# Patient Record
Sex: FEMALE | Race: WHITE | NOT HISPANIC OR LATINO | Employment: UNEMPLOYED | URBAN - METROPOLITAN AREA
[De-identification: names, ages, dates, MRNs, and addresses within clinical notes are randomized per-mention and may not be internally consistent; named-entity substitution may affect disease eponyms.]

---

## 2017-01-10 ENCOUNTER — OFFICE VISIT (OUTPATIENT)
Dept: CARDIAC REHAB | Facility: HOSPITAL | Age: 57
End: 2017-01-10

## 2017-01-10 DIAGNOSIS — J44.9 COPD MIXED TYPE (HCC): Primary | ICD-10-CM

## 2017-01-12 ENCOUNTER — OFFICE VISIT (OUTPATIENT)
Dept: CARDIAC REHAB | Facility: HOSPITAL | Age: 57
End: 2017-01-12

## 2017-01-12 DIAGNOSIS — J44.9 COPD MIXED TYPE (HCC): Primary | ICD-10-CM

## 2017-01-19 ENCOUNTER — OFFICE VISIT (OUTPATIENT)
Dept: CARDIAC REHAB | Facility: HOSPITAL | Age: 57
End: 2017-01-19

## 2017-01-19 DIAGNOSIS — J44.9 COPD MIXED TYPE (HCC): Primary | ICD-10-CM

## 2017-01-24 ENCOUNTER — OFFICE VISIT (OUTPATIENT)
Dept: CARDIAC REHAB | Facility: HOSPITAL | Age: 57
End: 2017-01-24

## 2017-01-24 DIAGNOSIS — J44.9 COPD MIXED TYPE (HCC): Primary | ICD-10-CM

## 2017-01-31 ENCOUNTER — OFFICE VISIT (OUTPATIENT)
Dept: CARDIAC REHAB | Facility: HOSPITAL | Age: 57
End: 2017-01-31

## 2017-01-31 DIAGNOSIS — J44.9 COPD MIXED TYPE (HCC): Primary | ICD-10-CM

## 2017-02-02 ENCOUNTER — OFFICE VISIT (OUTPATIENT)
Dept: CARDIAC REHAB | Facility: HOSPITAL | Age: 57
End: 2017-02-02

## 2017-02-02 DIAGNOSIS — J44.9 COPD MIXED TYPE (HCC): Primary | ICD-10-CM

## 2017-02-07 ENCOUNTER — OFFICE VISIT (OUTPATIENT)
Dept: CARDIAC REHAB | Facility: HOSPITAL | Age: 57
End: 2017-02-07

## 2017-02-07 DIAGNOSIS — J44.9 COPD MIXED TYPE (HCC): Primary | ICD-10-CM

## 2017-02-16 ENCOUNTER — OFFICE VISIT (OUTPATIENT)
Dept: CARDIAC REHAB | Facility: HOSPITAL | Age: 57
End: 2017-02-16

## 2017-02-16 DIAGNOSIS — J44.9 COPD MIXED TYPE (HCC): Primary | ICD-10-CM

## 2017-02-21 ENCOUNTER — OFFICE VISIT (OUTPATIENT)
Dept: CARDIAC REHAB | Facility: HOSPITAL | Age: 57
End: 2017-02-21

## 2017-02-21 DIAGNOSIS — J44.9 COPD MIXED TYPE (HCC): Primary | ICD-10-CM

## 2017-02-23 ENCOUNTER — OFFICE VISIT (OUTPATIENT)
Dept: CARDIAC REHAB | Facility: HOSPITAL | Age: 57
End: 2017-02-23

## 2017-02-23 DIAGNOSIS — J44.9 COPD MIXED TYPE (HCC): Primary | ICD-10-CM

## 2017-02-28 ENCOUNTER — OFFICE VISIT (OUTPATIENT)
Dept: CARDIAC REHAB | Facility: HOSPITAL | Age: 57
End: 2017-02-28

## 2017-02-28 DIAGNOSIS — J44.9 COPD MIXED TYPE (HCC): Primary | ICD-10-CM

## 2017-03-02 ENCOUNTER — OFFICE VISIT (OUTPATIENT)
Dept: CARDIAC REHAB | Facility: HOSPITAL | Age: 57
End: 2017-03-02

## 2017-03-02 DIAGNOSIS — J44.9 COPD MIXED TYPE (HCC): Primary | ICD-10-CM

## 2017-03-16 ENCOUNTER — OFFICE VISIT (OUTPATIENT)
Dept: CARDIAC REHAB | Facility: HOSPITAL | Age: 57
End: 2017-03-16

## 2017-03-16 DIAGNOSIS — J44.9 COPD MIXED TYPE (HCC): Primary | ICD-10-CM

## 2017-03-21 ENCOUNTER — OFFICE VISIT (OUTPATIENT)
Dept: CARDIAC REHAB | Facility: HOSPITAL | Age: 57
End: 2017-03-21

## 2017-03-21 DIAGNOSIS — J44.9 COPD MIXED TYPE (HCC): Primary | ICD-10-CM

## 2017-03-22 ENCOUNTER — TRANSCRIBE ORDERS (OUTPATIENT)
Dept: CARDIAC REHAB | Facility: HOSPITAL | Age: 57
End: 2017-03-22

## 2017-03-22 DIAGNOSIS — J44.9 COPD MIXED TYPE (HCC): Primary | ICD-10-CM

## 2017-04-04 ENCOUNTER — OFFICE VISIT (OUTPATIENT)
Dept: CARDIAC REHAB | Facility: HOSPITAL | Age: 57
End: 2017-04-04

## 2017-04-04 DIAGNOSIS — J44.9 COPD MIXED TYPE (HCC): Primary | ICD-10-CM

## 2017-04-06 ENCOUNTER — OFFICE VISIT (OUTPATIENT)
Dept: CARDIAC REHAB | Facility: HOSPITAL | Age: 57
End: 2017-04-06

## 2017-04-06 DIAGNOSIS — J44.9 COPD MIXED TYPE (HCC): Primary | ICD-10-CM

## 2017-04-11 ENCOUNTER — OFFICE VISIT (OUTPATIENT)
Dept: CARDIAC REHAB | Facility: HOSPITAL | Age: 57
End: 2017-04-11

## 2017-04-11 DIAGNOSIS — J44.9 COPD MIXED TYPE (HCC): Primary | ICD-10-CM

## 2017-04-18 ENCOUNTER — OFFICE VISIT (OUTPATIENT)
Dept: CARDIAC REHAB | Facility: HOSPITAL | Age: 57
End: 2017-04-18

## 2017-04-18 DIAGNOSIS — J44.9 COPD MIXED TYPE (HCC): Primary | ICD-10-CM

## 2017-04-25 ENCOUNTER — OFFICE VISIT (OUTPATIENT)
Dept: CARDIAC REHAB | Facility: HOSPITAL | Age: 57
End: 2017-04-25

## 2017-04-25 DIAGNOSIS — J44.9 COPD MIXED TYPE (HCC): Primary | ICD-10-CM

## 2017-04-27 ENCOUNTER — OFFICE VISIT (OUTPATIENT)
Dept: CARDIAC REHAB | Facility: HOSPITAL | Age: 57
End: 2017-04-27

## 2017-04-27 DIAGNOSIS — J44.9 COPD MIXED TYPE (HCC): Primary | ICD-10-CM

## 2017-05-09 ENCOUNTER — OFFICE VISIT (OUTPATIENT)
Dept: CARDIAC REHAB | Facility: HOSPITAL | Age: 57
End: 2017-05-09

## 2017-05-09 DIAGNOSIS — J44.9 COPD MIXED TYPE (HCC): Primary | ICD-10-CM

## 2017-05-25 ENCOUNTER — OFFICE VISIT (OUTPATIENT)
Dept: CARDIAC REHAB | Facility: HOSPITAL | Age: 57
End: 2017-05-25

## 2017-05-25 DIAGNOSIS — J44.9 COPD MIXED TYPE (HCC): Primary | ICD-10-CM

## 2017-05-30 ENCOUNTER — OFFICE VISIT (OUTPATIENT)
Dept: CARDIAC REHAB | Facility: HOSPITAL | Age: 57
End: 2017-05-30

## 2017-05-30 DIAGNOSIS — J44.9 COPD MIXED TYPE (HCC): Primary | ICD-10-CM

## 2017-06-08 ENCOUNTER — OFFICE VISIT (OUTPATIENT)
Dept: CARDIAC REHAB | Facility: HOSPITAL | Age: 57
End: 2017-06-08

## 2017-06-08 DIAGNOSIS — J44.9 COPD MIXED TYPE (HCC): Primary | ICD-10-CM

## 2017-06-13 ENCOUNTER — OFFICE VISIT (OUTPATIENT)
Dept: CARDIAC REHAB | Facility: HOSPITAL | Age: 57
End: 2017-06-13

## 2017-06-13 DIAGNOSIS — J44.9 COPD MIXED TYPE (HCC): Primary | ICD-10-CM

## 2017-08-29 ENCOUNTER — OFFICE VISIT (OUTPATIENT)
Dept: CARDIAC REHAB | Facility: HOSPITAL | Age: 57
End: 2017-08-29

## 2017-08-29 DIAGNOSIS — J44.9 COPD MIXED TYPE (HCC): Primary | ICD-10-CM

## 2017-09-05 ENCOUNTER — OFFICE VISIT (OUTPATIENT)
Dept: CARDIAC REHAB | Facility: HOSPITAL | Age: 57
End: 2017-09-05

## 2017-09-05 DIAGNOSIS — J44.9 COPD MIXED TYPE (HCC): Primary | ICD-10-CM

## 2017-09-12 ENCOUNTER — OFFICE VISIT (OUTPATIENT)
Dept: CARDIAC REHAB | Facility: HOSPITAL | Age: 57
End: 2017-09-12

## 2017-09-12 DIAGNOSIS — J44.9 COPD MIXED TYPE (HCC): Primary | ICD-10-CM

## 2017-09-14 ENCOUNTER — OFFICE VISIT (OUTPATIENT)
Dept: CARDIAC REHAB | Facility: HOSPITAL | Age: 57
End: 2017-09-14

## 2017-09-14 DIAGNOSIS — J44.9 COPD MIXED TYPE (HCC): Primary | ICD-10-CM

## 2017-09-19 ENCOUNTER — OFFICE VISIT (OUTPATIENT)
Dept: CARDIAC REHAB | Facility: HOSPITAL | Age: 57
End: 2017-09-19

## 2017-09-19 DIAGNOSIS — J44.9 COPD MIXED TYPE (HCC): Primary | ICD-10-CM

## 2017-10-10 ENCOUNTER — OFFICE VISIT (OUTPATIENT)
Dept: CARDIAC REHAB | Facility: HOSPITAL | Age: 57
End: 2017-10-10

## 2017-10-10 DIAGNOSIS — J44.9 COPD MIXED TYPE (HCC): Primary | ICD-10-CM

## 2017-10-17 ENCOUNTER — OFFICE VISIT (OUTPATIENT)
Dept: CARDIAC REHAB | Facility: HOSPITAL | Age: 57
End: 2017-10-17

## 2017-10-17 DIAGNOSIS — J44.9 COPD MIXED TYPE (HCC): Primary | ICD-10-CM

## 2018-02-22 ENCOUNTER — TRANSCRIBE ORDERS (OUTPATIENT)
Dept: CARDIAC REHAB | Facility: HOSPITAL | Age: 58
End: 2018-02-22

## 2018-02-22 DIAGNOSIS — J44.9 COPD MIXED TYPE (HCC): Primary | ICD-10-CM

## 2018-08-30 ENCOUNTER — TRANSCRIBE ORDERS (OUTPATIENT)
Dept: CARDIAC REHAB | Facility: HOSPITAL | Age: 58
End: 2018-08-30

## 2018-08-30 ENCOUNTER — OFFICE VISIT (OUTPATIENT)
Dept: CARDIAC REHAB | Facility: HOSPITAL | Age: 58
End: 2018-08-30

## 2018-08-30 DIAGNOSIS — J44.9 COPD MIXED TYPE (HCC): Primary | ICD-10-CM

## 2018-09-04 ENCOUNTER — OFFICE VISIT (OUTPATIENT)
Dept: CARDIAC REHAB | Facility: HOSPITAL | Age: 58
End: 2018-09-04

## 2018-09-04 DIAGNOSIS — J44.9 COPD MIXED TYPE (HCC): Primary | ICD-10-CM

## 2018-09-06 ENCOUNTER — OFFICE VISIT (OUTPATIENT)
Dept: CARDIAC REHAB | Facility: HOSPITAL | Age: 58
End: 2018-09-06

## 2018-09-06 DIAGNOSIS — J44.9 COPD MIXED TYPE (HCC): Primary | ICD-10-CM

## 2018-09-13 ENCOUNTER — OFFICE VISIT (OUTPATIENT)
Dept: CARDIAC REHAB | Facility: HOSPITAL | Age: 58
End: 2018-09-13

## 2018-09-13 DIAGNOSIS — J44.9 COPD MIXED TYPE (HCC): Primary | ICD-10-CM

## 2018-09-18 ENCOUNTER — OFFICE VISIT (OUTPATIENT)
Dept: CARDIAC REHAB | Facility: HOSPITAL | Age: 58
End: 2018-09-18

## 2018-09-18 DIAGNOSIS — J44.9 COPD MIXED TYPE (HCC): Primary | ICD-10-CM

## 2018-09-20 ENCOUNTER — OFFICE VISIT (OUTPATIENT)
Dept: CARDIAC REHAB | Facility: HOSPITAL | Age: 58
End: 2018-09-20

## 2018-09-20 DIAGNOSIS — J44.9 COPD MIXED TYPE (HCC): Primary | ICD-10-CM

## 2018-09-25 ENCOUNTER — OFFICE VISIT (OUTPATIENT)
Dept: CARDIAC REHAB | Facility: HOSPITAL | Age: 58
End: 2018-09-25

## 2018-09-25 DIAGNOSIS — J44.9 COPD MIXED TYPE (HCC): Primary | ICD-10-CM

## 2018-09-27 ENCOUNTER — OFFICE VISIT (OUTPATIENT)
Dept: CARDIAC REHAB | Facility: HOSPITAL | Age: 58
End: 2018-09-27

## 2018-09-27 DIAGNOSIS — J44.9 COPD MIXED TYPE (HCC): Primary | ICD-10-CM

## 2018-10-09 ENCOUNTER — OFFICE VISIT (OUTPATIENT)
Dept: CARDIAC REHAB | Facility: HOSPITAL | Age: 58
End: 2018-10-09

## 2018-10-09 DIAGNOSIS — J44.9 COPD MIXED TYPE (HCC): Primary | ICD-10-CM

## 2018-10-16 ENCOUNTER — OFFICE VISIT (OUTPATIENT)
Dept: CARDIAC REHAB | Facility: HOSPITAL | Age: 58
End: 2018-10-16

## 2018-10-16 DIAGNOSIS — J44.9 COPD MIXED TYPE (HCC): Primary | ICD-10-CM

## 2018-10-23 ENCOUNTER — OFFICE VISIT (OUTPATIENT)
Dept: CARDIAC REHAB | Facility: HOSPITAL | Age: 58
End: 2018-10-23

## 2018-10-23 DIAGNOSIS — J44.9 COPD MIXED TYPE (HCC): Primary | ICD-10-CM

## 2018-10-25 ENCOUNTER — OFFICE VISIT (OUTPATIENT)
Dept: CARDIAC REHAB | Facility: HOSPITAL | Age: 58
End: 2018-10-25

## 2018-10-25 DIAGNOSIS — J44.9 COPD MIXED TYPE (HCC): Primary | ICD-10-CM

## 2018-10-30 ENCOUNTER — OFFICE VISIT (OUTPATIENT)
Dept: CARDIAC REHAB | Facility: HOSPITAL | Age: 58
End: 2018-10-30

## 2018-10-30 DIAGNOSIS — J44.9 COPD MIXED TYPE (HCC): Primary | ICD-10-CM

## 2018-11-01 ENCOUNTER — OFFICE VISIT (OUTPATIENT)
Dept: CARDIAC REHAB | Facility: HOSPITAL | Age: 58
End: 2018-11-01

## 2018-11-01 DIAGNOSIS — J44.9 COPD MIXED TYPE (HCC): Primary | ICD-10-CM

## 2018-11-06 ENCOUNTER — OFFICE VISIT (OUTPATIENT)
Dept: CARDIAC REHAB | Facility: HOSPITAL | Age: 58
End: 2018-11-06

## 2018-11-06 DIAGNOSIS — J44.9 COPD MIXED TYPE (HCC): Primary | ICD-10-CM

## 2018-11-08 ENCOUNTER — OFFICE VISIT (OUTPATIENT)
Dept: CARDIAC REHAB | Facility: HOSPITAL | Age: 58
End: 2018-11-08

## 2018-11-08 DIAGNOSIS — J44.9 COPD MIXED TYPE (HCC): Primary | ICD-10-CM

## 2018-11-13 ENCOUNTER — OFFICE VISIT (OUTPATIENT)
Dept: CARDIAC REHAB | Facility: HOSPITAL | Age: 58
End: 2018-11-13

## 2018-11-13 DIAGNOSIS — J44.9 COPD MIXED TYPE (HCC): Primary | ICD-10-CM

## 2018-11-29 ENCOUNTER — OFFICE VISIT (OUTPATIENT)
Dept: CARDIAC REHAB | Facility: HOSPITAL | Age: 58
End: 2018-11-29

## 2018-11-29 DIAGNOSIS — J44.9 COPD MIXED TYPE (HCC): Primary | ICD-10-CM

## 2018-12-04 ENCOUNTER — OFFICE VISIT (OUTPATIENT)
Dept: CARDIAC REHAB | Facility: HOSPITAL | Age: 58
End: 2018-12-04

## 2018-12-04 DIAGNOSIS — J44.9 COPD MIXED TYPE (HCC): Primary | ICD-10-CM

## 2018-12-06 ENCOUNTER — OFFICE VISIT (OUTPATIENT)
Dept: CARDIAC REHAB | Facility: HOSPITAL | Age: 58
End: 2018-12-06

## 2018-12-06 DIAGNOSIS — J44.9 COPD MIXED TYPE (HCC): Primary | ICD-10-CM

## 2018-12-11 ENCOUNTER — OFFICE VISIT (OUTPATIENT)
Dept: CARDIAC REHAB | Facility: HOSPITAL | Age: 58
End: 2018-12-11

## 2018-12-11 DIAGNOSIS — J44.9 COPD MIXED TYPE (HCC): Primary | ICD-10-CM

## 2018-12-18 ENCOUNTER — OFFICE VISIT (OUTPATIENT)
Dept: CARDIAC REHAB | Facility: HOSPITAL | Age: 58
End: 2018-12-18

## 2018-12-18 DIAGNOSIS — J44.9 COPD MIXED TYPE (HCC): Primary | ICD-10-CM

## 2018-12-20 ENCOUNTER — OFFICE VISIT (OUTPATIENT)
Dept: CARDIAC REHAB | Facility: HOSPITAL | Age: 58
End: 2018-12-20

## 2018-12-20 DIAGNOSIS — J44.9 COPD MIXED TYPE (HCC): Primary | ICD-10-CM

## 2019-01-03 ENCOUNTER — OFFICE VISIT (OUTPATIENT)
Dept: CARDIAC REHAB | Facility: HOSPITAL | Age: 59
End: 2019-01-03

## 2019-01-03 DIAGNOSIS — J44.9 COPD MIXED TYPE (HCC): Primary | ICD-10-CM

## 2019-01-08 ENCOUNTER — OFFICE VISIT (OUTPATIENT)
Dept: CARDIAC REHAB | Facility: HOSPITAL | Age: 59
End: 2019-01-08

## 2019-01-08 DIAGNOSIS — J44.9 COPD MIXED TYPE (HCC): Primary | ICD-10-CM

## 2019-01-10 ENCOUNTER — OFFICE VISIT (OUTPATIENT)
Dept: CARDIAC REHAB | Facility: HOSPITAL | Age: 59
End: 2019-01-10

## 2019-01-10 DIAGNOSIS — J44.9 COPD MIXED TYPE (HCC): Primary | ICD-10-CM

## 2019-01-15 ENCOUNTER — OFFICE VISIT (OUTPATIENT)
Dept: CARDIAC REHAB | Facility: HOSPITAL | Age: 59
End: 2019-01-15

## 2019-01-15 DIAGNOSIS — J44.9 COPD MIXED TYPE (HCC): Primary | ICD-10-CM

## 2019-01-17 ENCOUNTER — OFFICE VISIT (OUTPATIENT)
Dept: CARDIAC REHAB | Facility: HOSPITAL | Age: 59
End: 2019-01-17

## 2019-01-17 DIAGNOSIS — J44.9 COPD MIXED TYPE (HCC): Primary | ICD-10-CM

## 2019-02-05 ENCOUNTER — OFFICE VISIT (OUTPATIENT)
Dept: CARDIAC REHAB | Facility: HOSPITAL | Age: 59
End: 2019-02-05

## 2019-02-05 DIAGNOSIS — J44.9 COPD MIXED TYPE (HCC): Primary | ICD-10-CM

## 2019-02-14 ENCOUNTER — OFFICE VISIT (OUTPATIENT)
Dept: CARDIAC REHAB | Facility: HOSPITAL | Age: 59
End: 2019-02-14

## 2019-02-14 DIAGNOSIS — J44.9 COPD MIXED TYPE (HCC): Primary | ICD-10-CM

## 2019-02-19 ENCOUNTER — OFFICE VISIT (OUTPATIENT)
Dept: CARDIAC REHAB | Facility: HOSPITAL | Age: 59
End: 2019-02-19

## 2019-02-19 DIAGNOSIS — J44.9 COPD MIXED TYPE (HCC): Primary | ICD-10-CM

## 2019-02-26 ENCOUNTER — OFFICE VISIT (OUTPATIENT)
Dept: CARDIAC REHAB | Facility: HOSPITAL | Age: 59
End: 2019-02-26

## 2019-02-26 DIAGNOSIS — J44.9 COPD MIXED TYPE (HCC): Primary | ICD-10-CM

## 2019-03-12 ENCOUNTER — OFFICE VISIT (OUTPATIENT)
Dept: CARDIAC REHAB | Facility: HOSPITAL | Age: 59
End: 2019-03-12

## 2019-03-12 DIAGNOSIS — J44.9 COPD MIXED TYPE (HCC): Primary | ICD-10-CM

## 2019-03-14 ENCOUNTER — OFFICE VISIT (OUTPATIENT)
Dept: CARDIAC REHAB | Facility: HOSPITAL | Age: 59
End: 2019-03-14

## 2019-03-14 DIAGNOSIS — J44.9 COPD MIXED TYPE (HCC): Primary | ICD-10-CM

## 2019-03-19 ENCOUNTER — OFFICE VISIT (OUTPATIENT)
Dept: CARDIAC REHAB | Facility: HOSPITAL | Age: 59
End: 2019-03-19

## 2019-03-19 DIAGNOSIS — J44.9 COPD MIXED TYPE (HCC): Primary | ICD-10-CM

## 2019-03-21 ENCOUNTER — APPOINTMENT (OUTPATIENT)
Dept: CARDIAC REHAB | Facility: HOSPITAL | Age: 59
End: 2019-03-21

## 2019-03-28 ENCOUNTER — OFFICE VISIT (OUTPATIENT)
Dept: CARDIAC REHAB | Facility: HOSPITAL | Age: 59
End: 2019-03-28

## 2019-03-28 DIAGNOSIS — J44.9 COPD MIXED TYPE (HCC): Primary | ICD-10-CM

## 2019-04-23 ENCOUNTER — OFFICE VISIT (OUTPATIENT)
Dept: CARDIAC REHAB | Facility: HOSPITAL | Age: 59
End: 2019-04-23

## 2019-04-23 DIAGNOSIS — J44.9 COPD MIXED TYPE (HCC): Primary | ICD-10-CM

## 2019-04-25 ENCOUNTER — OFFICE VISIT (OUTPATIENT)
Dept: CARDIAC REHAB | Facility: HOSPITAL | Age: 59
End: 2019-04-25

## 2019-04-25 DIAGNOSIS — J44.9 COPD MIXED TYPE (HCC): Primary | ICD-10-CM

## 2019-04-30 ENCOUNTER — OFFICE VISIT (OUTPATIENT)
Dept: CARDIAC REHAB | Facility: HOSPITAL | Age: 59
End: 2019-04-30

## 2019-04-30 DIAGNOSIS — J44.9 COPD MIXED TYPE (HCC): Primary | ICD-10-CM

## 2019-05-02 ENCOUNTER — OFFICE VISIT (OUTPATIENT)
Dept: CARDIAC REHAB | Facility: HOSPITAL | Age: 59
End: 2019-05-02

## 2019-05-02 DIAGNOSIS — J44.9 COPD MIXED TYPE (HCC): Primary | ICD-10-CM

## 2019-05-09 ENCOUNTER — OFFICE VISIT (OUTPATIENT)
Dept: CARDIAC REHAB | Facility: HOSPITAL | Age: 59
End: 2019-05-09

## 2019-05-09 DIAGNOSIS — J44.9 COPD MIXED TYPE (HCC): Primary | ICD-10-CM

## 2019-05-14 ENCOUNTER — OFFICE VISIT (OUTPATIENT)
Dept: CARDIAC REHAB | Facility: HOSPITAL | Age: 59
End: 2019-05-14

## 2019-05-14 DIAGNOSIS — J44.9 COPD MIXED TYPE (HCC): Primary | ICD-10-CM

## 2019-05-28 ENCOUNTER — OFFICE VISIT (OUTPATIENT)
Dept: CARDIAC REHAB | Facility: HOSPITAL | Age: 59
End: 2019-05-28

## 2019-05-28 DIAGNOSIS — J44.9 COPD MIXED TYPE (HCC): Primary | ICD-10-CM

## 2019-05-30 ENCOUNTER — OFFICE VISIT (OUTPATIENT)
Dept: CARDIAC REHAB | Facility: HOSPITAL | Age: 59
End: 2019-05-30

## 2019-05-30 DIAGNOSIS — J44.9 COPD MIXED TYPE (HCC): Primary | ICD-10-CM

## 2019-06-04 ENCOUNTER — OFFICE VISIT (OUTPATIENT)
Dept: CARDIAC REHAB | Facility: HOSPITAL | Age: 59
End: 2019-06-04

## 2019-06-04 DIAGNOSIS — J44.9 COPD MIXED TYPE (HCC): Primary | ICD-10-CM

## 2019-06-25 ENCOUNTER — OFFICE VISIT (OUTPATIENT)
Dept: CARDIAC REHAB | Facility: HOSPITAL | Age: 59
End: 2019-06-25

## 2019-06-25 DIAGNOSIS — J44.9 COPD MIXED TYPE (HCC): Primary | ICD-10-CM

## 2019-07-02 ENCOUNTER — OFFICE VISIT (OUTPATIENT)
Dept: CARDIAC REHAB | Facility: HOSPITAL | Age: 59
End: 2019-07-02

## 2019-07-02 DIAGNOSIS — J44.9 COPD MIXED TYPE (HCC): Primary | ICD-10-CM

## 2019-07-23 ENCOUNTER — OFFICE VISIT (OUTPATIENT)
Dept: CARDIAC REHAB | Facility: HOSPITAL | Age: 59
End: 2019-07-23

## 2019-07-23 DIAGNOSIS — J44.9 COPD MIXED TYPE (HCC): Primary | ICD-10-CM

## 2019-07-25 ENCOUNTER — OFFICE VISIT (OUTPATIENT)
Dept: CARDIAC REHAB | Facility: HOSPITAL | Age: 59
End: 2019-07-25

## 2019-07-25 DIAGNOSIS — J44.9 COPD MIXED TYPE (HCC): Primary | ICD-10-CM

## 2019-07-30 ENCOUNTER — OFFICE VISIT (OUTPATIENT)
Dept: CARDIAC REHAB | Facility: HOSPITAL | Age: 59
End: 2019-07-30

## 2019-07-30 DIAGNOSIS — J44.9 COPD MIXED TYPE (HCC): Primary | ICD-10-CM

## 2019-08-01 ENCOUNTER — OFFICE VISIT (OUTPATIENT)
Dept: CARDIAC REHAB | Facility: HOSPITAL | Age: 59
End: 2019-08-01

## 2019-08-01 DIAGNOSIS — J44.9 COPD MIXED TYPE (HCC): Primary | ICD-10-CM

## 2019-08-08 ENCOUNTER — OFFICE VISIT (OUTPATIENT)
Dept: CARDIAC REHAB | Facility: HOSPITAL | Age: 59
End: 2019-08-08

## 2019-08-08 DIAGNOSIS — J44.9 COPD MIXED TYPE (HCC): Primary | ICD-10-CM

## 2019-08-13 ENCOUNTER — OFFICE VISIT (OUTPATIENT)
Dept: CARDIAC REHAB | Facility: HOSPITAL | Age: 59
End: 2019-08-13

## 2019-08-13 DIAGNOSIS — J44.9 COPD MIXED TYPE (HCC): Primary | ICD-10-CM

## 2019-08-15 ENCOUNTER — OFFICE VISIT (OUTPATIENT)
Dept: CARDIAC REHAB | Facility: HOSPITAL | Age: 59
End: 2019-08-15

## 2019-08-15 DIAGNOSIS — J44.9 COPD MIXED TYPE (HCC): Primary | ICD-10-CM

## 2019-08-20 ENCOUNTER — OFFICE VISIT (OUTPATIENT)
Dept: CARDIAC REHAB | Facility: HOSPITAL | Age: 59
End: 2019-08-20

## 2019-08-20 DIAGNOSIS — J44.9 COPD MIXED TYPE (HCC): Primary | ICD-10-CM

## 2019-09-03 ENCOUNTER — OFFICE VISIT (OUTPATIENT)
Dept: CARDIAC REHAB | Facility: HOSPITAL | Age: 59
End: 2019-09-03

## 2019-09-03 DIAGNOSIS — J44.9 COPD MIXED TYPE (HCC): Primary | ICD-10-CM

## 2019-09-05 ENCOUNTER — OFFICE VISIT (OUTPATIENT)
Dept: CARDIAC REHAB | Facility: HOSPITAL | Age: 59
End: 2019-09-05

## 2019-09-05 DIAGNOSIS — J44.9 COPD MIXED TYPE (HCC): Primary | ICD-10-CM

## 2019-09-10 ENCOUNTER — OFFICE VISIT (OUTPATIENT)
Dept: CARDIAC REHAB | Facility: HOSPITAL | Age: 59
End: 2019-09-10

## 2019-09-10 DIAGNOSIS — J44.9 COPD MIXED TYPE (HCC): Primary | ICD-10-CM

## 2019-09-18 ENCOUNTER — TRANSCRIBE ORDERS (OUTPATIENT)
Dept: CARDIAC REHAB | Facility: HOSPITAL | Age: 59
End: 2019-09-18

## 2019-09-18 DIAGNOSIS — J44.9 COPD MIXED TYPE (HCC): Primary | ICD-10-CM

## 2019-09-24 ENCOUNTER — OFFICE VISIT (OUTPATIENT)
Dept: CARDIAC REHAB | Facility: HOSPITAL | Age: 59
End: 2019-09-24

## 2019-09-24 DIAGNOSIS — J44.9 COPD MIXED TYPE (HCC): Primary | ICD-10-CM

## 2019-09-26 ENCOUNTER — OFFICE VISIT (OUTPATIENT)
Dept: CARDIAC REHAB | Facility: HOSPITAL | Age: 59
End: 2019-09-26

## 2019-09-26 DIAGNOSIS — J44.9 COPD MIXED TYPE (HCC): Primary | ICD-10-CM

## 2019-10-15 ENCOUNTER — OFFICE VISIT (OUTPATIENT)
Dept: CARDIAC REHAB | Facility: HOSPITAL | Age: 59
End: 2019-10-15

## 2019-10-15 DIAGNOSIS — J44.9 COPD MIXED TYPE (HCC): Primary | ICD-10-CM

## 2019-11-05 ENCOUNTER — OFFICE VISIT (OUTPATIENT)
Dept: CARDIAC REHAB | Facility: HOSPITAL | Age: 59
End: 2019-11-05

## 2019-11-05 DIAGNOSIS — J44.9 COPD MIXED TYPE (HCC): Primary | ICD-10-CM

## 2019-11-14 ENCOUNTER — TELEPHONE (OUTPATIENT)
Dept: FAMILY MEDICINE CLINIC | Facility: CLINIC | Age: 59
End: 2019-11-14

## 2019-11-15 RX ORDER — TRIAMTERENE AND HYDROCHLOROTHIAZIDE 37.5; 25 MG/1; MG/1
1 TABLET ORAL DAILY
COMMUNITY
End: 2019-11-19 | Stop reason: SDUPTHER

## 2019-11-19 RX ORDER — TRIAMTERENE AND HYDROCHLOROTHIAZIDE 37.5; 25 MG/1; MG/1
1 TABLET ORAL DAILY
Qty: 30 TABLET | Refills: 0 | Status: SHIPPED | OUTPATIENT
Start: 2019-11-19 | End: 2020-01-08 | Stop reason: SDUPTHER

## 2019-11-19 NOTE — TELEPHONE ENCOUNTER
Called pt, she will call back to schedule appointment. She needed to look at her schedule.  Advised I would send the 30 days supply.    Local on file.

## 2019-11-21 ENCOUNTER — OFFICE VISIT (OUTPATIENT)
Dept: CARDIAC REHAB | Facility: HOSPITAL | Age: 59
End: 2019-11-21

## 2019-11-21 DIAGNOSIS — J44.9 COPD MIXED TYPE (HCC): Primary | ICD-10-CM

## 2019-12-10 ENCOUNTER — OFFICE VISIT (OUTPATIENT)
Dept: CARDIAC REHAB | Facility: HOSPITAL | Age: 59
End: 2019-12-10

## 2019-12-10 DIAGNOSIS — J44.9 COPD MIXED TYPE (HCC): Primary | ICD-10-CM

## 2020-01-08 ENCOUNTER — OFFICE VISIT (OUTPATIENT)
Dept: FAMILY MEDICINE CLINIC | Facility: CLINIC | Age: 60
End: 2020-01-08

## 2020-01-08 VITALS
HEIGHT: 61 IN | BODY MASS INDEX: 22.28 KG/M2 | HEART RATE: 94 BPM | WEIGHT: 118 LBS | DIASTOLIC BLOOD PRESSURE: 78 MMHG | OXYGEN SATURATION: 98 % | TEMPERATURE: 99.3 F | SYSTOLIC BLOOD PRESSURE: 120 MMHG

## 2020-01-08 DIAGNOSIS — S16.1XXA STRAIN OF NECK MUSCLE, INITIAL ENCOUNTER: ICD-10-CM

## 2020-01-08 DIAGNOSIS — J44.1 COPD WITH ACUTE EXACERBATION (HCC): Primary | ICD-10-CM

## 2020-01-08 DIAGNOSIS — I10 BENIGN ESSENTIAL HYPERTENSION: ICD-10-CM

## 2020-01-08 PROBLEM — Z90.710 H/O: HYSTERECTOMY: Status: ACTIVE | Noted: 2018-07-09

## 2020-01-08 PROBLEM — Z78.0 POSTMENOPAUSE: Status: ACTIVE | Noted: 2018-07-09

## 2020-01-08 PROBLEM — R09.02 COPD WITH HYPOXIA: Status: ACTIVE | Noted: 2018-07-09

## 2020-01-08 PROBLEM — J44.9 COPD WITH HYPOXIA: Status: ACTIVE | Noted: 2018-07-09

## 2020-01-08 PROCEDURE — 99203 OFFICE O/P NEW LOW 30 MIN: CPT | Performed by: INTERNAL MEDICINE

## 2020-01-08 RX ORDER — ALENDRONATE SODIUM 70 MG/1
70 TABLET ORAL
COMMUNITY
Start: 2018-08-02 | End: 2020-06-09

## 2020-01-08 RX ORDER — BUDESONIDE AND FORMOTEROL FUMARATE DIHYDRATE 160; 4.5 UG/1; UG/1
2 AEROSOL RESPIRATORY (INHALATION)
COMMUNITY

## 2020-01-08 RX ORDER — PREDNISONE 20 MG/1
TABLET ORAL
Qty: 11 TABLET | Refills: 0 | Status: SHIPPED | OUTPATIENT
Start: 2020-01-08 | End: 2020-01-17

## 2020-01-08 RX ORDER — TRIAMTERENE AND HYDROCHLOROTHIAZIDE 37.5; 25 MG/1; MG/1
1 TABLET ORAL DAILY
Qty: 90 TABLET | Refills: 1 | Status: SHIPPED | OUTPATIENT
Start: 2020-01-08 | End: 2020-01-13 | Stop reason: SDUPTHER

## 2020-01-08 RX ORDER — AMITRIPTYLINE HYDROCHLORIDE 25 MG/1
TABLET, FILM COATED ORAL
COMMUNITY
Start: 2019-11-01 | End: 2020-01-31

## 2020-01-08 RX ORDER — ESTRADIOL 0.1 MG/D
FILM, EXTENDED RELEASE TRANSDERMAL
COMMUNITY
Start: 2019-11-26 | End: 2022-05-25

## 2020-01-08 RX ORDER — BACLOFEN 10 MG/1
10 TABLET ORAL 3 TIMES DAILY
Qty: 30 TABLET | Refills: 1 | Status: SHIPPED | OUTPATIENT
Start: 2020-01-08 | End: 2022-05-25

## 2020-01-08 RX ORDER — AMOXICILLIN AND CLAVULANATE POTASSIUM 875; 125 MG/1; MG/1
TABLET, FILM COATED ORAL
COMMUNITY
Start: 2020-01-03 | End: 2020-06-09

## 2020-01-08 RX ORDER — OSELTAMIVIR PHOSPHATE 75 MG/1
CAPSULE ORAL
COMMUNITY
Start: 2020-01-03 | End: 2020-06-09

## 2020-01-08 RX ORDER — DEXLANSOPRAZOLE 60 MG/1
CAPSULE, DELAYED RELEASE ORAL
COMMUNITY
Start: 2019-11-22 | End: 2020-02-24

## 2020-01-08 RX ORDER — ROFLUMILAST 500 UG/1
500 TABLET ORAL DAILY
COMMUNITY
Start: 2019-11-01

## 2020-01-08 NOTE — PROGRESS NOTES
Subjective   Rocío Valadez is a 59 y.o. female.     Chief Complaint   Patient presents with   • Cough   • URI   • Pain     neck   • Hypertension     [facility]       History of Present Illness   Complains of cough and upper respiratory symptoms with runny nose.  Called pulmonologist Dr Rollins and was given augmentin and tamiflu.  Patient is taking the augmentin and mucinex but not the tamiflu.  Has chest and head congestion with thick mucous and wheezing.  Complains of neck discomfort.  Present for a week.  Patient called Dr Albrecht but cannot be seen till 1/21/19.  Pain is mostly on the right and goes to the shoulder described as a constant toothache type of pain.  Pain is keeping her awake.  Tried naprosyn, ibuprofen, heat, topical OTC patches with no relief.  No known instigators of the pain or falls/trauma.  Follow-up for hypertension.  Currently has been feeling well and asymptomatic without any headaches,vision changes, cough, chest pain, shortness of breath, swelling, focal neurologic deficit, memory loss or syncope.  Has been taking the medications regularly and adherent with the regimen maxzide-25.  Denies medication side effects and no significant interval events.      The following portions of the patient's history were reviewed and updated as appropriate: allergies, current medications, past family history, past medical history, past social history, past surgical history and problem list.    Depression Screen:  No flowsheet data found.    Past Medical History:   Diagnosis Date   • Abdominal pain, RUQ (right upper quadrant)    • Acid reflux    • Allergic rhinitis    • Arthritis    • Atypical chest pain    • Benign essential hypertension    • Bloating    • Constipation    • COPD (chronic obstructive pulmonary disease) (CMS/HCC)    • Cough    • Dyspareunia in female    • Dysphagia    • Early satiety    • Elevated blood sugar level    • Epigastric abdominal pain    • Gallbladder sludge    •  Headache    • History of colon polyps    • History of hiatal hernia    • Hyperlipidemia    • Itching    • Left lower quadrant abdominal tenderness    • Low back pain    • Menopausal symptoms    • Nausea    • Need for influenza vaccination    • Rash    • Throat clearing    • Vagina itching    • Vaginal atrophy    • Vaginal irritation    • Visit for routine gyn exam    • Visit for screening mammogram        Past Surgical History:   Procedure Laterality Date   • ABDOMINAL HYSTERECTOMY  1994 1994- fibroid tumors and menorhagia   • BREAST SURGERY Bilateral     enlargement procedure. [facility]   • COLONOSCOPY  2008 2008- normal; 3/10/16    • HYSTERECTOMY      not due to cancer       Family History   Problem Relation Age of Onset   • COPD Mother    • Diabetes Mother    • Hypertension Mother    • Heart disease Mother    • Aneurysm Father    • Hypertension Father    • Liver cancer Brother    • Lung cancer Maternal Grandmother        Social History     Socioeconomic History   • Marital status:      Spouse name: Not on file   • Number of children: Not on file   • Years of education: Not on file   • Highest education level: Not on file   Tobacco Use   • Smoking status: Former Smoker   • Smokeless tobacco: Never Used   Substance and Sexual Activity   • Alcohol use: Yes   • Drug use: Never   • Sexual activity: Defer       Current Outpatient Medications   Medication Sig Dispense Refill   • alendronate (FOSAMAX) 70 MG tablet Take 70 mg by mouth.     • amitriptyline (ELAVIL) 25 MG tablet      • amoxicillin-clavulanate (AUGMENTIN) 875-125 MG per tablet      • budesonide-formoterol (SYMBICORT) 160-4.5 MCG/ACT inhaler Inhale.     • DALIRESP 500 MCG tablet tablet      • DEXILANT 60 MG capsule      • estradiol (MINIVELLE, VIVELLE-DOT) 0.1 MG/24HR patch      • linaclotide (LINZESS) 290 MCG capsule capsule Take  by mouth Daily.     • oseltamivir (TAMIFLU) 75 MG capsule      • PROAIR  (90 Base) MCG/ACT inhaler      •  "tiotropium (SPIRIVA HANDIHALER) 18 MCG per inhalation capsule Place  into inhaler and inhale.     • triamterene-hydrochlorothiazide (MAXZIDE-25) 37.5-25 MG per tablet Take 1 tablet by mouth Daily. 90 tablet 1   • baclofen (LIORESAL) 10 MG tablet Take 1 tablet by mouth 3 (Three) Times a Day. 30 tablet 1   • predniSONE (DELTASONE) 20 MG tablet Take 2 tablets by mouth Daily for 2 days, THEN 1 tablet Daily for 3 days, THEN 0.5 tablets Daily for 4 days. 11 tablet 0     No current facility-administered medications for this visit.        Review of Systems   Constitutional: Negative for activity change, appetite change, fatigue, fever, unexpected weight gain and unexpected weight loss.   HENT: Negative for nosebleeds, rhinorrhea, trouble swallowing and voice change.    Eyes: Negative for visual disturbance.   Respiratory: Negative for cough, chest tightness, shortness of breath and wheezing.    Cardiovascular: Negative for chest pain, palpitations and leg swelling.   Gastrointestinal: Negative for abdominal pain, blood in stool, constipation, diarrhea, nausea, vomiting, GERD and indigestion.   Genitourinary: Negative for dysuria, frequency and hematuria.   Musculoskeletal: Negative for arthralgias, back pain and myalgias.   Skin: Negative for rash and bruise.   Neurological: Negative for dizziness, tremors, weakness, light-headedness, numbness, headache and memory problem.   Hematological: Negative for adenopathy. Does not bruise/bleed easily.   Psychiatric/Behavioral: Negative for sleep disturbance and depressed mood. The patient is not nervous/anxious.        Objective   /78 (BP Location: Left arm, Patient Position: Sitting, Cuff Size: Adult)   Pulse 94   Temp 99.3 °F (37.4 °C)   Ht 154.9 cm (61\")   Wt 53.5 kg (118 lb)   SpO2 98%   BMI 22.30 kg/m²     Physical Exam   Constitutional: She is oriented to person, place, and time. She appears well-developed and well-nourished. No distress.   HENT:   Head: " Normocephalic and atraumatic.   Right Ear: External ear normal.   Left Ear: External ear normal.   Nose: Nose normal.   Mouth/Throat: Oropharynx is clear and moist.   Eyes: Pupils are equal, round, and reactive to light. Conjunctivae and EOM are normal.   Neck: Normal range of motion. Neck supple. No tracheal deviation present. No thyromegaly present.   Cardiovascular: Normal rate, regular rhythm, normal heart sounds and intact distal pulses. Exam reveals no gallop and no friction rub.   No murmur heard.  Pulmonary/Chest: Effort normal. No respiratory distress. She has wheezes.   Abdominal: Soft. Bowel sounds are normal. She exhibits no mass. There is no tenderness. There is no guarding.   Musculoskeletal: Normal range of motion. She exhibits no edema.   Tender right upper trapezius muscle with no rash.   Lymphadenopathy:     She has no cervical adenopathy.   Neurological: She is alert and oriented to person, place, and time. She displays normal reflexes. She exhibits normal muscle tone.   Skin: Skin is warm and dry. Capillary refill takes less than 2 seconds. No rash noted. She is not diaphoretic.   Psychiatric: She has a normal mood and affect. Her behavior is normal. Judgment and thought content normal.   Nursing note and vitals reviewed.      No results found for this or any previous visit (from the past 2016 hour(s)).  Assessment/Plan   Rocío was seen today for cough, uri, pain and hypertension.    Diagnoses and all orders for this visit:    COPD with acute exacerbation (CMS/[facility])  -     predniSONE (DELTASONE) 20 MG tablet; Take 2 tablets by mouth Daily for 2 days, THEN 1 tablet Daily for 3 days, THEN 0.5 tablets Daily for 4 days.    Benign essential hypertension  -     triamterene-hydrochlorothiazide (MAXZIDE-25) 37.5-25 MG per tablet; Take 1 tablet by mouth Daily.    Strain of neck muscle, initial encounter  -     baclofen (LIORESAL) 10 MG tablet; Take 1 tablet by mouth 3 (Three) Times a Day.

## 2020-01-13 ENCOUNTER — TELEPHONE (OUTPATIENT)
Dept: FAMILY MEDICINE CLINIC | Facility: CLINIC | Age: 60
End: 2020-01-13

## 2020-01-13 DIAGNOSIS — I10 BENIGN ESSENTIAL HYPERTENSION: ICD-10-CM

## 2020-01-13 RX ORDER — TRIAMTERENE AND HYDROCHLOROTHIAZIDE 37.5; 25 MG/1; MG/1
1 TABLET ORAL DAILY
Qty: 90 TABLET | Refills: 1 | Status: SHIPPED | OUTPATIENT
Start: 2020-01-13 | End: 2020-07-13

## 2020-01-13 NOTE — TELEPHONE ENCOUNTER
Pt needs triamterene-hydrochlorothiazide (MAXZIDE-25) 37.5-25 MG per tablet [facility] to [facility] not [facility]

## 2020-01-31 RX ORDER — AMITRIPTYLINE HYDROCHLORIDE 25 MG/1
TABLET, FILM COATED ORAL
Qty: 90 TABLET | Refills: 4 | Status: SHIPPED | OUTPATIENT
Start: 2020-01-31 | End: 2022-10-25 | Stop reason: SDUPTHER

## 2020-02-24 RX ORDER — DEXLANSOPRAZOLE 60 MG/1
CAPSULE, DELAYED RELEASE ORAL
Qty: 90 CAPSULE | Refills: 4 | Status: SHIPPED | OUTPATIENT
Start: 2020-02-24

## 2020-02-24 NOTE — TELEPHONE ENCOUNTER
Ok to refill Dexilant? Last OV scanned into chart DOS 02/25/2019. Let me know if anything else is needed.        TS

## 2020-06-09 ENCOUNTER — E-VISIT (OUTPATIENT)
Dept: FAMILY MEDICINE CLINIC | Facility: CLINIC | Age: 60
End: 2020-06-09

## 2020-06-09 DIAGNOSIS — H10.31 ACUTE CONJUNCTIVITIS OF RIGHT EYE, UNSPECIFIED ACUTE CONJUNCTIVITIS TYPE: ICD-10-CM

## 2020-06-09 DIAGNOSIS — H00.011 HORDEOLUM EXTERNUM OF RIGHT UPPER EYELID: Primary | ICD-10-CM

## 2020-06-09 PROCEDURE — 99422 OL DIG E/M SVC 11-20 MIN: CPT | Performed by: INTERNAL MEDICINE

## 2020-06-09 RX ORDER — SULFACETAMIDE SODIUM 100 MG/G
OINTMENT OPHTHALMIC EVERY 6 HOURS
Qty: 3.5 G | Refills: 0 | Status: SHIPPED | OUTPATIENT
Start: 2020-06-09 | End: 2020-06-15 | Stop reason: RX

## 2020-06-09 NOTE — PROGRESS NOTES
Rocío Valadez    1960  [id number]    I have reviewed the e-Visit questionnaire and patient's answers, my assessment and plan are as follows:    HPI  Patient with 2 weeks of right eye stye with crustin, itching, increased tearing and drainage with some eyelid swelling.  OTC treatments with no relief.Answers for HPI/ROS submitted by the patient on 6/9/2020   How long have you been having these symptoms?: Greater than 2 weeks    Review of Systems - see HPI      Diagnoses and all orders for this visit:    Hordeolum externum of right upper eyelid  -     sulfacetamide (BLEPH-10) 10 % ophthalmic ointment; Administer  to the right eye Every 6 (Six) Hours.    Acute conjunctivitis of right eye, unspecified acute conjunctivitis type  -     sulfacetamide (BLEPH-10) 10 % ophthalmic ointment; Administer  to the right eye Every 6 (Six) Hours.    Continue the current compresses and use the antibiotic ointment as prescribed.  If changes in vision, increasing pain or fever then to Urgent Care for evaluation ASAP.    Any medications prescribed have been sent electronically to   [facility] - [place], [place] - [address] [place] [address] AT [facility] - [phone]  - [phone]   [address] [facility] [address]  Phone: [phone] Fax: [phone]    [facility] - [place], [place] - [address] [place] [address] - [phone]  - [phone]  [address] [facility] [address]  Phone: [phone] Fax: [phone]        Adolfo Calixto MD  06/09/20  2:30 PM

## 2020-06-09 NOTE — PATIENT INSTRUCTIONS
Continue the current compresses and use the antibiotic ointment (sulfacetamide ophthalmic ointment) every 6 hours as prescribed.  If changes in vision, increasing pain or fever then to Urgent Care for evaluation ASAP.

## 2020-06-15 ENCOUNTER — LAB (OUTPATIENT)
Dept: LAB | Facility: HOSPITAL | Age: 60
End: 2020-06-15

## 2020-06-15 ENCOUNTER — HOSPITAL ENCOUNTER (OUTPATIENT)
Dept: CARDIOLOGY | Facility: HOSPITAL | Age: 60
Discharge: HOME | End: 2020-06-15
Admitting: ORTHOPAEDIC SURGERY

## 2020-06-15 ENCOUNTER — TELEPHONE (OUTPATIENT)
Dept: FAMILY MEDICINE CLINIC | Facility: CLINIC | Age: 60
End: 2020-06-15

## 2020-06-15 ENCOUNTER — TRANSCRIBE ORDERS (OUTPATIENT)
Dept: ADMINISTRATIVE | Facility: HOSPITAL | Age: 60
End: 2020-06-15

## 2020-06-15 DIAGNOSIS — M75.101 ROTATOR CUFF TEAR ARTHROPATHY OF RIGHT SHOULDER: ICD-10-CM

## 2020-06-15 DIAGNOSIS — M12.811 ROTATOR CUFF TEAR ARTHROPATHY OF RIGHT SHOULDER: Primary | ICD-10-CM

## 2020-06-15 DIAGNOSIS — M12.811 ROTATOR CUFF TEAR ARTHROPATHY OF RIGHT SHOULDER: ICD-10-CM

## 2020-06-15 DIAGNOSIS — Z01.818 PRE-OP TESTING: Primary | ICD-10-CM

## 2020-06-15 DIAGNOSIS — M75.101 ROTATOR CUFF TEAR ARTHROPATHY OF RIGHT SHOULDER: Primary | ICD-10-CM

## 2020-06-15 DIAGNOSIS — U07.1 COVID-19: Primary | ICD-10-CM

## 2020-06-15 DIAGNOSIS — Z01.818 PRE-OP TESTING: ICD-10-CM

## 2020-06-15 LAB
ALBUMIN SERPL-MCNC: 4.5 G/DL (ref 3.5–5.2)
ALBUMIN/GLOB SERPL: 2 G/DL
ALP SERPL-CCNC: 69 U/L (ref 39–117)
ALT SERPL W P-5'-P-CCNC: 16 U/L (ref 1–33)
ANION GAP SERPL CALCULATED.3IONS-SCNC: 8.5 MMOL/L (ref 5–15)
AST SERPL-CCNC: 16 U/L (ref 1–32)
BASOPHILS # BLD AUTO: 0.04 10*3/MM3 (ref 0–0.2)
BASOPHILS NFR BLD AUTO: 0.6 % (ref 0–1.5)
BILIRUB SERPL-MCNC: 0.2 MG/DL (ref 0.2–1.2)
BUN BLD-MCNC: 8 MG/DL (ref 8–23)
BUN/CREAT SERPL: 14 (ref 7–25)
CALCIUM SPEC-SCNC: 9.2 MG/DL (ref 8.6–10.5)
CHLORIDE SERPL-SCNC: 101 MMOL/L (ref 98–107)
CO2 SERPL-SCNC: 31.5 MMOL/L (ref 22–29)
CREAT BLD-MCNC: 0.57 MG/DL (ref 0.57–1)
DEPRECATED RDW RBC AUTO: 40 FL (ref 37–54)
EOSINOPHIL # BLD AUTO: 0.41 10*3/MM3 (ref 0–0.4)
EOSINOPHIL NFR BLD AUTO: 5.7 % (ref 0.3–6.2)
ERYTHROCYTE [DISTWIDTH] IN BLOOD BY AUTOMATED COUNT: 11.9 % (ref 12.3–15.4)
GFR SERPL CREATININE-BSD FRML MDRD: 108 ML/MIN/1.73
GLOBULIN UR ELPH-MCNC: 2.2 GM/DL
GLUCOSE BLD-MCNC: 110 MG/DL (ref 65–99)
HCT VFR BLD AUTO: 40.9 % (ref 34–46.6)
HGB BLD-MCNC: 13.6 G/DL (ref 12–15.9)
IMM GRANULOCYTES # BLD AUTO: 0.03 10*3/MM3 (ref 0–0.05)
IMM GRANULOCYTES NFR BLD AUTO: 0.4 % (ref 0–0.5)
LYMPHOCYTES # BLD AUTO: 1.46 10*3/MM3 (ref 0.7–3.1)
LYMPHOCYTES NFR BLD AUTO: 20.3 % (ref 19.6–45.3)
MCH RBC QN AUTO: 30.2 PG (ref 26.6–33)
MCHC RBC AUTO-ENTMCNC: 33.3 G/DL (ref 31.5–35.7)
MCV RBC AUTO: 90.9 FL (ref 79–97)
MONOCYTES # BLD AUTO: 0.68 10*3/MM3 (ref 0.1–0.9)
MONOCYTES NFR BLD AUTO: 9.5 % (ref 5–12)
NEUTROPHILS # BLD AUTO: 4.57 10*3/MM3 (ref 1.7–7)
NEUTROPHILS NFR BLD AUTO: 63.5 % (ref 42.7–76)
NRBC BLD AUTO-RTO: 0 /100 WBC (ref 0–0.2)
PLATELET # BLD AUTO: 318 10*3/MM3 (ref 140–450)
PMV BLD AUTO: 9.9 FL (ref 6–12)
POTASSIUM BLD-SCNC: 4.3 MMOL/L (ref 3.5–5.2)
PROT SERPL-MCNC: 6.7 G/DL (ref 6–8.5)
RBC # BLD AUTO: 4.5 10*6/MM3 (ref 3.77–5.28)
SODIUM BLD-SCNC: 141 MMOL/L (ref 136–145)
WBC NRBC COR # BLD: 7.19 10*3/MM3 (ref 3.4–10.8)

## 2020-06-15 PROCEDURE — 93005 ELECTROCARDIOGRAM TRACING: CPT | Performed by: ORTHOPAEDIC SURGERY

## 2020-06-15 PROCEDURE — 85025 COMPLETE CBC W/AUTO DIFF WBC: CPT

## 2020-06-15 PROCEDURE — 80053 COMPREHEN METABOLIC PANEL: CPT

## 2020-06-15 PROCEDURE — C9803 HOPD COVID-19 SPEC COLLECT: HCPCS

## 2020-06-15 PROCEDURE — 93010 ELECTROCARDIOGRAM REPORT: CPT | Performed by: INTERNAL MEDICINE

## 2020-06-15 PROCEDURE — U0004 COV-19 TEST NON-CDC HGH THRU: HCPCS

## 2020-06-15 PROCEDURE — 36415 COLL VENOUS BLD VENIPUNCTURE: CPT

## 2020-06-15 RX ORDER — NEOMYCIN SULFATE, POLYMYXIN B SULFATE, BACITRACIN ZINC, HYDROCORTISONE 3.5; 10000; 400; 1 MG/G; [USP'U]/G; [USP'U]/G; MG/G
OINTMENT OPHTHALMIC 4 TIMES DAILY
Qty: 3.5 G | Refills: 0 | Status: SHIPPED | OUTPATIENT
Start: 2020-06-15 | End: 2022-05-25

## 2020-06-15 NOTE — TELEPHONE ENCOUNTER
PATIENT HAS TRIED MULTIPLE PHARMACIES TO GET THE EYE OINTMENT PRESCRIPTION sulfacetamide (BLEPH-10) 10 % ophthalmic ointment  sHE STILL HAS THE STYE ON HER EYELID ANDHASN'T BEEN ABLE TO TAKE ANY MEDICATION FOR IT. BECAUSE THE NO ONE HAS THEMEDICATION TO FILL.     PLEASE ADVISE.     [facility] AND [facility]    PATIENT CALLBACK [phone]

## 2020-06-16 LAB
REF LAB TEST METHOD: NORMAL
SARS-COV-2 RNA RESP QL NAA+PROBE: NOT DETECTED

## 2020-06-16 RX ORDER — CEFAZOLIN SODIUM 2 G/100ML
2 INJECTION, SOLUTION INTRAVENOUS ONCE
Status: CANCELLED | OUTPATIENT
Start: 2020-06-17 | End: 2020-06-16

## 2020-06-16 NOTE — H&P
Provider: HENRIETTA ESTRADA MD  HPI  Patient here for recheck of her right shoulder and to discuss the findings of her MRI scan.  She is now 2 weeks out from her injury.  She has not had any improvement in her strength or range of motion.  Social history was automatically updated to reflect changes to the patient's age and marital status.      Referring Dr. PASHA BRODERICK MD    Physical Exam  Height:  65 in.    Weight:  116 lbs.     BMI:  19.37    Right Shoulder  Skin is normal.  There is no atrophy.  There is no effusion.  There is no warmth.  No erythema.  Lymphadenopathy is negative.  Right shoulder active ROM today shows: Elevation = 60; ER(side) = 40; ER(abd) = 30; IR(abd) = 50; IR(vert) = to waist; Abduction = 70.  Shoulder strength: Elevation = 3/5; ER = 4/5; IR = 5-/5; ABD = 4/5.  Neer test is positive.  Poe test is positive.  Arc of motion is positive.  Empty can test was positive.  Pulses are normal.  Normal sensation.  Capillary refill is normal.        Imaging/Diagnostic Studies  Right shoulder MRI shows Massive retracted tear of the supra spinatus and infraspinatus with retraction to the level of the joint.  There is no disproportionate atrophy.  Humeral head is slightly high riding      Impression  Right acute full thickness rotator cuff tear    Plan  The patient had normal shoulder function with no pain plantar to her fall 2 weeks ago.  Therefore this is an acute tear though it is a massive tear.  I discussed with her the procedure of right shoulder arthroscopy for rotator cuff repair and augmented with platelet rich plasma.Today we discussed that procedure in detail.  We discussed the need for physical therapy, the use of the sling and the risk of the surgery.  We discussed the extended recovery period.  All questions are answered and informed consent was obtained.  She is on supplemental O2 for COPD.  If she has any problems with oxygenation in the postop period  We will keep her for  observation.        Please note:   Portions of this dictation were generated using voice recognition software. There may be grammatical, syntactical and/or typographical errors due to limitations inherent in this system.  Please contact the author with any questions regarding the content of this document.

## 2020-06-17 ENCOUNTER — HOSPITAL ENCOUNTER (OUTPATIENT)
Facility: HOSPITAL | Age: 60
Setting detail: HOSPITAL OUTPATIENT SURGERY
Discharge: HOME | End: 2020-06-17
Attending: ORTHOPAEDIC SURGERY | Admitting: ORTHOPAEDIC SURGERY

## 2020-06-17 ENCOUNTER — ANESTHESIA EVENT (OUTPATIENT)
Dept: PERIOP | Facility: HOSPITAL | Age: 60
End: 2020-06-17

## 2020-06-17 ENCOUNTER — ANESTHESIA (OUTPATIENT)
Dept: PERIOP | Facility: HOSPITAL | Age: 60
End: 2020-06-17

## 2020-06-17 VITALS
BODY MASS INDEX: 21.71 KG/M2 | DIASTOLIC BLOOD PRESSURE: 85 MMHG | SYSTOLIC BLOOD PRESSURE: 155 MMHG | OXYGEN SATURATION: 100 % | WEIGHT: 115 LBS | HEART RATE: 86 BPM | TEMPERATURE: 98.2 F | HEIGHT: 61 IN | RESPIRATION RATE: 18 BRPM

## 2020-06-17 PROCEDURE — 25010000002 FENTANYL CITRATE (PF) 100 MCG/2ML SOLUTION: Performed by: NURSE ANESTHETIST, CERTIFIED REGISTERED

## 2020-06-17 PROCEDURE — 25010000002 HYDROMORPHONE 1 MG/ML SOLUTION

## 2020-06-17 PROCEDURE — 63710000001 PROMETHAZINE PER 25 MG: Performed by: NURSE ANESTHETIST, CERTIFIED REGISTERED

## 2020-06-17 PROCEDURE — 25010000002 ONDANSETRON PER 1 MG: Performed by: NURSE ANESTHETIST, CERTIFIED REGISTERED

## 2020-06-17 PROCEDURE — 25010000003 CEFAZOLIN IN DEXTROSE 2-4 GM/100ML-% SOLUTION: Performed by: ORTHOPAEDIC SURGERY

## 2020-06-17 PROCEDURE — 25010000002 EPINEPHRINE PER 0.1 MG: Performed by: ORTHOPAEDIC SURGERY

## 2020-06-17 PROCEDURE — 25010000002 HYDROMORPHONE PER 4 MG: Performed by: NURSE ANESTHETIST, CERTIFIED REGISTERED

## 2020-06-17 PROCEDURE — 25010000002 PROPOFOL 10 MG/ML EMULSION: Performed by: NURSE ANESTHETIST, CERTIFIED REGISTERED

## 2020-06-17 PROCEDURE — 25010000002 DEXAMETHASONE PER 1 MG: Performed by: NURSE ANESTHETIST, CERTIFIED REGISTERED

## 2020-06-17 PROCEDURE — C1713 ANCHOR/SCREW BN/BN,TIS/BN: HCPCS | Performed by: ORTHOPAEDIC SURGERY

## 2020-06-17 PROCEDURE — 25010000002 KETOROLAC TROMETHAMINE PER 15 MG: Performed by: NURSE ANESTHETIST, CERTIFIED REGISTERED

## 2020-06-17 DEVICE — IMPLANTABLE DEVICE
Type: IMPLANTABLE DEVICE | Site: SHOULDER | Status: FUNCTIONAL
Brand: JUGGERKNOT SOFT ANCHORS

## 2020-06-17 RX ORDER — MIDAZOLAM HYDROCHLORIDE 1 MG/ML
1 INJECTION INTRAMUSCULAR; INTRAVENOUS
Status: DISCONTINUED | OUTPATIENT
Start: 2020-06-17 | End: 2020-06-17 | Stop reason: HOSPADM

## 2020-06-17 RX ORDER — SODIUM CHLORIDE 0.9 % (FLUSH) 0.9 %
3-10 SYRINGE (ML) INJECTION AS NEEDED
Status: DISCONTINUED | OUTPATIENT
Start: 2020-06-17 | End: 2020-06-17 | Stop reason: HOSPADM

## 2020-06-17 RX ORDER — SODIUM CHLORIDE, SODIUM LACTATE, POTASSIUM CHLORIDE, CALCIUM CHLORIDE 600; 310; 30; 20 MG/100ML; MG/100ML; MG/100ML; MG/100ML
9 INJECTION, SOLUTION INTRAVENOUS CONTINUOUS
Status: DISCONTINUED | OUTPATIENT
Start: 2020-06-17 | End: 2020-06-17 | Stop reason: HOSPADM

## 2020-06-17 RX ORDER — CEFAZOLIN SODIUM 2 G/100ML
2 INJECTION, SOLUTION INTRAVENOUS ONCE
Status: COMPLETED | OUTPATIENT
Start: 2020-06-17 | End: 2020-06-17

## 2020-06-17 RX ORDER — EPHEDRINE SULFATE 50 MG/ML
5 INJECTION, SOLUTION INTRAVENOUS ONCE AS NEEDED
Status: DISCONTINUED | OUTPATIENT
Start: 2020-06-17 | End: 2020-06-17 | Stop reason: HOSPADM

## 2020-06-17 RX ORDER — PROMETHAZINE HYDROCHLORIDE 25 MG/1
25 SUPPOSITORY RECTAL ONCE AS NEEDED
Status: DISCONTINUED | OUTPATIENT
Start: 2020-06-17 | End: 2020-06-17 | Stop reason: HOSPADM

## 2020-06-17 RX ORDER — PROMETHAZINE HYDROCHLORIDE 25 MG/ML
12.5 INJECTION, SOLUTION INTRAMUSCULAR; INTRAVENOUS ONCE AS NEEDED
Status: DISCONTINUED | OUTPATIENT
Start: 2020-06-17 | End: 2020-06-17 | Stop reason: HOSPADM

## 2020-06-17 RX ORDER — HYDROCODONE BITARTRATE AND ACETAMINOPHEN 7.5; 325 MG/1; MG/1
1 TABLET ORAL ONCE AS NEEDED
Status: COMPLETED | OUTPATIENT
Start: 2020-06-17 | End: 2020-06-17

## 2020-06-17 RX ORDER — PROMETHAZINE HYDROCHLORIDE 25 MG/ML
6.25 INJECTION, SOLUTION INTRAMUSCULAR; INTRAVENOUS
Status: DISCONTINUED | OUTPATIENT
Start: 2020-06-17 | End: 2020-06-17 | Stop reason: HOSPADM

## 2020-06-17 RX ORDER — LIDOCAINE HYDROCHLORIDE 10 MG/ML
0.5 INJECTION, SOLUTION EPIDURAL; INFILTRATION; INTRACAUDAL; PERINEURAL ONCE AS NEEDED
Status: DISCONTINUED | OUTPATIENT
Start: 2020-06-17 | End: 2020-06-17 | Stop reason: HOSPADM

## 2020-06-17 RX ORDER — HYDROMORPHONE HYDROCHLORIDE 1 MG/ML
0.5 INJECTION, SOLUTION INTRAMUSCULAR; INTRAVENOUS; SUBCUTANEOUS
Status: DISCONTINUED | OUTPATIENT
Start: 2020-06-17 | End: 2020-06-17 | Stop reason: HOSPADM

## 2020-06-17 RX ORDER — OXYCODONE AND ACETAMINOPHEN 7.5; 325 MG/1; MG/1
1 TABLET ORAL ONCE AS NEEDED
Status: COMPLETED | OUTPATIENT
Start: 2020-06-17 | End: 2020-06-17

## 2020-06-17 RX ORDER — NALOXONE HCL 0.4 MG/ML
0.2 VIAL (ML) INJECTION AS NEEDED
Status: DISCONTINUED | OUTPATIENT
Start: 2020-06-17 | End: 2020-06-17 | Stop reason: HOSPADM

## 2020-06-17 RX ORDER — ACETAMINOPHEN 500 MG
500 TABLET ORAL ONCE
Status: COMPLETED | OUTPATIENT
Start: 2020-06-17 | End: 2020-06-17

## 2020-06-17 RX ORDER — PROMETHAZINE HYDROCHLORIDE 25 MG/1
25 TABLET ORAL ONCE AS NEEDED
Status: DISCONTINUED | OUTPATIENT
Start: 2020-06-17 | End: 2020-06-17 | Stop reason: HOSPADM

## 2020-06-17 RX ORDER — ONDANSETRON 2 MG/ML
INJECTION INTRAMUSCULAR; INTRAVENOUS AS NEEDED
Status: DISCONTINUED | OUTPATIENT
Start: 2020-06-17 | End: 2020-06-17 | Stop reason: SURG

## 2020-06-17 RX ORDER — HYDRALAZINE HYDROCHLORIDE 20 MG/ML
5 INJECTION INTRAMUSCULAR; INTRAVENOUS
Status: DISCONTINUED | OUTPATIENT
Start: 2020-06-17 | End: 2020-06-17 | Stop reason: HOSPADM

## 2020-06-17 RX ORDER — SODIUM CHLORIDE 0.9 % (FLUSH) 0.9 %
3 SYRINGE (ML) INJECTION EVERY 12 HOURS SCHEDULED
Status: DISCONTINUED | OUTPATIENT
Start: 2020-06-17 | End: 2020-06-17 | Stop reason: HOSPADM

## 2020-06-17 RX ORDER — ONDANSETRON 2 MG/ML
4 INJECTION INTRAMUSCULAR; INTRAVENOUS ONCE AS NEEDED
Status: DISCONTINUED | OUTPATIENT
Start: 2020-06-17 | End: 2020-06-17 | Stop reason: HOSPADM

## 2020-06-17 RX ORDER — FENTANYL CITRATE 50 UG/ML
50 INJECTION, SOLUTION INTRAMUSCULAR; INTRAVENOUS
Status: DISCONTINUED | OUTPATIENT
Start: 2020-06-17 | End: 2020-06-17 | Stop reason: HOSPADM

## 2020-06-17 RX ORDER — ACETAMINOPHEN 325 MG/1
650 TABLET ORAL ONCE AS NEEDED
Status: DISCONTINUED | OUTPATIENT
Start: 2020-06-17 | End: 2020-06-17 | Stop reason: HOSPADM

## 2020-06-17 RX ORDER — OXYCODONE HYDROCHLORIDE AND ACETAMINOPHEN 5; 325 MG/1; MG/1
2 TABLET ORAL EVERY 4 HOURS PRN
Qty: 40 TABLET | Refills: 0 | Status: SHIPPED | OUTPATIENT
Start: 2020-06-17 | End: 2020-06-23

## 2020-06-17 RX ORDER — PROPOFOL 10 MG/ML
VIAL (ML) INTRAVENOUS AS NEEDED
Status: DISCONTINUED | OUTPATIENT
Start: 2020-06-17 | End: 2020-06-17 | Stop reason: SURG

## 2020-06-17 RX ORDER — FLUMAZENIL 0.1 MG/ML
0.2 INJECTION INTRAVENOUS AS NEEDED
Status: DISCONTINUED | OUTPATIENT
Start: 2020-06-17 | End: 2020-06-17 | Stop reason: HOSPADM

## 2020-06-17 RX ORDER — FENTANYL CITRATE 50 UG/ML
INJECTION, SOLUTION INTRAMUSCULAR; INTRAVENOUS AS NEEDED
Status: DISCONTINUED | OUTPATIENT
Start: 2020-06-17 | End: 2020-06-17 | Stop reason: SURG

## 2020-06-17 RX ORDER — BUPIVACAINE HYDROCHLORIDE AND EPINEPHRINE 5; 5 MG/ML; UG/ML
INJECTION, SOLUTION PERINEURAL AS NEEDED
Status: DISCONTINUED | OUTPATIENT
Start: 2020-06-17 | End: 2020-06-17 | Stop reason: HOSPADM

## 2020-06-17 RX ORDER — DIPHENHYDRAMINE HCL 25 MG
25 CAPSULE ORAL
Status: DISCONTINUED | OUTPATIENT
Start: 2020-06-17 | End: 2020-06-17 | Stop reason: HOSPADM

## 2020-06-17 RX ORDER — DEXAMETHASONE SODIUM PHOSPHATE 10 MG/ML
INJECTION INTRAMUSCULAR; INTRAVENOUS AS NEEDED
Status: DISCONTINUED | OUTPATIENT
Start: 2020-06-17 | End: 2020-06-17 | Stop reason: SURG

## 2020-06-17 RX ORDER — DIPHENHYDRAMINE HYDROCHLORIDE 50 MG/ML
12.5 INJECTION INTRAMUSCULAR; INTRAVENOUS
Status: DISCONTINUED | OUTPATIENT
Start: 2020-06-17 | End: 2020-06-17 | Stop reason: HOSPADM

## 2020-06-17 RX ORDER — LABETALOL HYDROCHLORIDE 5 MG/ML
5 INJECTION, SOLUTION INTRAVENOUS
Status: DISCONTINUED | OUTPATIENT
Start: 2020-06-17 | End: 2020-06-17 | Stop reason: HOSPADM

## 2020-06-17 RX ORDER — KETOROLAC TROMETHAMINE 30 MG/ML
INJECTION, SOLUTION INTRAMUSCULAR; INTRAVENOUS AS NEEDED
Status: DISCONTINUED | OUTPATIENT
Start: 2020-06-17 | End: 2020-06-17 | Stop reason: SURG

## 2020-06-17 RX ORDER — ACETAMINOPHEN 650 MG/1
650 SUPPOSITORY RECTAL ONCE AS NEEDED
Status: DISCONTINUED | OUTPATIENT
Start: 2020-06-17 | End: 2020-06-17 | Stop reason: HOSPADM

## 2020-06-17 RX ORDER — ROCURONIUM BROMIDE 10 MG/ML
INJECTION, SOLUTION INTRAVENOUS AS NEEDED
Status: DISCONTINUED | OUTPATIENT
Start: 2020-06-17 | End: 2020-06-17 | Stop reason: SURG

## 2020-06-17 RX ADMIN — CEFAZOLIN SODIUM 2 G: 2 INJECTION, SOLUTION INTRAVENOUS at 09:25

## 2020-06-17 RX ADMIN — FENTANYL CITRATE 50 MCG: 50 INJECTION INTRAMUSCULAR; INTRAVENOUS at 09:37

## 2020-06-17 RX ADMIN — HYDROMORPHONE HYDROCHLORIDE 0.5 MG: 1 INJECTION, SOLUTION INTRAMUSCULAR; INTRAVENOUS; SUBCUTANEOUS at 10:50

## 2020-06-17 RX ADMIN — ACETAMINOPHEN 500 MG: 500 TABLET, FILM COATED ORAL at 09:02

## 2020-06-17 RX ADMIN — PROPOFOL 150 MG: 10 INJECTION, EMULSION INTRAVENOUS at 09:16

## 2020-06-17 RX ADMIN — ROCURONIUM BROMIDE 10 MG: 10 INJECTION, SOLUTION INTRAVENOUS at 09:37

## 2020-06-17 RX ADMIN — PROMETHAZINE HYDROCHLORIDE 25 MG: 25 TABLET ORAL at 10:45

## 2020-06-17 RX ADMIN — SUGAMMADEX 150 MG: 100 INJECTION, SOLUTION INTRAVENOUS at 10:22

## 2020-06-17 RX ADMIN — OXYCODONE HYDROCHLORIDE AND ACETAMINOPHEN 1 TABLET: 7.5; 325 TABLET ORAL at 12:31

## 2020-06-17 RX ADMIN — FENTANYL CITRATE 50 MCG: 50 INJECTION, SOLUTION INTRAMUSCULAR; INTRAVENOUS at 11:00

## 2020-06-17 RX ADMIN — FENTANYL CITRATE 50 MCG: 50 INJECTION, SOLUTION INTRAMUSCULAR; INTRAVENOUS at 10:55

## 2020-06-17 RX ADMIN — PROPOFOL 50 MG: 10 INJECTION, EMULSION INTRAVENOUS at 09:37

## 2020-06-17 RX ADMIN — SODIUM CHLORIDE, POTASSIUM CHLORIDE, SODIUM LACTATE AND CALCIUM CHLORIDE 9 ML/HR: 600; 310; 30; 20 INJECTION, SOLUTION INTRAVENOUS at 08:54

## 2020-06-17 RX ADMIN — DEXAMETHASONE SODIUM PHOSPHATE 8 MG: 10 INJECTION INTRAMUSCULAR; INTRAVENOUS at 10:07

## 2020-06-17 RX ADMIN — KETOROLAC TROMETHAMINE 30 MG: 30 INJECTION, SOLUTION INTRAMUSCULAR; INTRAVENOUS at 10:16

## 2020-06-17 RX ADMIN — FENTANYL CITRATE 50 MCG: 50 INJECTION INTRAMUSCULAR; INTRAVENOUS at 09:16

## 2020-06-17 RX ADMIN — HYDROCODONE BITARTRATE AND ACETAMINOPHEN 1 TABLET: 7.5; 325 TABLET ORAL at 10:45

## 2020-06-17 RX ADMIN — HYDROMORPHONE HYDROCHLORIDE 0.5 MG: 1 INJECTION, SOLUTION INTRAMUSCULAR; INTRAVENOUS; SUBCUTANEOUS at 10:40

## 2020-06-17 RX ADMIN — FENTANYL CITRATE 50 MCG: 50 INJECTION, SOLUTION INTRAMUSCULAR; INTRAVENOUS at 10:45

## 2020-06-17 RX ADMIN — ONDANSETRON HYDROCHLORIDE 4 MG: 2 SOLUTION INTRAMUSCULAR; INTRAVENOUS at 10:05

## 2020-06-17 RX ADMIN — HYDROMORPHONE HYDROCHLORIDE 0.5 MG: 1 INJECTION, SOLUTION INTRAMUSCULAR; INTRAVENOUS; SUBCUTANEOUS at 11:05

## 2020-06-17 RX ADMIN — ROCURONIUM BROMIDE 40 MG: 10 INJECTION, SOLUTION INTRAVENOUS at 09:16

## 2020-06-17 NOTE — ANESTHESIA PREPROCEDURE EVALUATION
Anesthesia Evaluation     Patient summary reviewed and Nursing notes reviewed   no history of anesthetic complications:  NPO Solid Status: > 8 hours  NPO Liquid Status: > 2 hours           Airway   Mallampati: II  TM distance: >3 FB  Neck ROM: full  No difficulty expected  Dental - normal exam     Pulmonary     breath sounds clear to auscultation  (+) a smoker Former, COPD severe,     ROS comment: Patient on 3L home O2  Cardiovascular   Exercise tolerance: good (4-7 METS)    ECG reviewed  Rhythm: regular  Rate: normal    (+) hypertension, hyperlipidemia,       Neuro/Psych  GI/Hepatic/Renal/Endo    (+)  GERD,      Musculoskeletal     Abdominal     Abdomen: soft.   Substance History      OB/GYN          Other                        Anesthesia Plan    ASA 4     general   (Given hx of severe COPD and oxygen dependence will avoid interscalene block. Will evaluate pain in PACU and assess need for regional. )  intravenous induction     Anesthetic plan, all risks, benefits, and alternatives have been provided, discussed and informed consent has been obtained with: patient.    Plan discussed with CRNA.

## 2020-06-17 NOTE — OP NOTE
SHOULDER ARTHROSCOPY  Procedure Note    Rocío Valadez  6/17/2020    Pre-op Diagnosis:   1.  Right rotator cuff tear-large  2.   3.     Post-op Diagnosis:     1.  Same  2.   3.     Procedure(s):  1.  Right arthroscopic rotator cuff repair with anchors x3  2.  Acromioplasty  3.  Augmentation of repair with platelet rich plasma  4.     Surgeon(s):  Isidoro Albrecht MD    Anesthesia: General  Anesthesiologist: Dustin Morris MD  CRNA: Julia Farah CRNA    Staff:   Circulator: Evangelina Sorto RN  Scrub Person: Valerie Cornelius  Vendor Representative: Elia Farris  Assistant: Laz Simon PA-C      Drains: None    Estimated Blood Loss: minimal    Specimen: * No orders in the log *    Description of Procedure:   I.  Following induction of anesthesia the patient was placed in the beachchair position.  The right shoulder was prepped and draped in a sterile fashion.  I created the posterior portal and inserted the cannula into the joint.  She was found to have a large retracted tear of the rotator cuff.  I then placed the camera in the subacromial space and created the lateral portal.  She was found to have a large U-shaped tear involving the supraspinatus and infraspinatus with retraction.  I used the femoral probe to release soft tissue from the acromion and to release the coracoacromial ligament and inserted the 4 mm bur and performed an acromioplasty removing around 1 cm of bone.    II.  I then debrided the entire greater tuberosity down to bleeding cancellus bony surface.  From the anterior portal I use a grasper to retract the cuff tissue anteriorly and laterally.  I then used the thermal probe to lyse adhesions between the bursa and rotator cuff tissue.  We were able to mobilize the tissue well enough to cover the greater tuberosity.  I then sequentially placed 3 Biomet 2.9 mm juggernaut suture anchors beginning at the posterior margin of the tuberosity with anchors at the mid and anterior tuberosity  passing one limb of the suture through the lateral edge of the rotator cuff and a simple suture pattern and tied with an SMC sliding knot.  We achieve complete coverage of the tuberosity and our repair was stable.  At this point I placed the cannula for the platelet rich plasma under our repair under direct visualization.  We then turned off the pump and allow fluid to drain from the shoulder.  I then injected the 10 cc of PRP solution under and around our cuff repair.  We then removed the cannulas and closed the portals with a 3-0 nylon.  She was then placed into a soft sterile dressing and into her postop sling.  She will be discharged to recovery.  Her prognosis is good.    Laz Simon PA-C assisted me in this procedure.  His presence was necessary to help with holding the patient's limb and the camera.  He allowed me to perform the procedure in a much quicker fashion resulting in less morbidity and risk for the patient.  An extra set of skilled sugical hands was necessary to effectively perform this procedure.    Findings: See Dictation    Complications: None      Isidoro Albrecht MD     Date: 6/17/2020  Time: 10:16

## 2020-06-23 ENCOUNTER — TELEMEDICINE (OUTPATIENT)
Dept: FAMILY MEDICINE CLINIC | Facility: TELEHEALTH | Age: 60
End: 2020-06-23

## 2020-06-23 DIAGNOSIS — B37.0 ORAL THRUSH: Primary | ICD-10-CM

## 2020-06-23 PROCEDURE — 99213 OFFICE O/P EST LOW 20 MIN: CPT | Performed by: NURSE PRACTITIONER

## 2020-06-23 RX ORDER — DIPHENHYDRAMINE, LIDOCAINE, NYSTATIN
5 KIT ORAL 4 TIMES DAILY
Qty: 280 ML | Refills: 0 | Status: SHIPPED | OUTPATIENT
Start: 2020-06-23 | End: 2020-06-23 | Stop reason: ALTCHOICE

## 2020-06-23 NOTE — PROGRESS NOTES
Subjective   Chief Complaint   Patient presents with   • Oral Pain     tongue pain     This was a video visit, I spent a total of 18 minutes reviewing this chart.     Rocío Valadez is a 60 y.o. female.     Pt reports tongue irritation, itching and a white coating x several days. She states it almost feels like she has burned her tongue and symptoms are worse on the right. She noticed symptoms started after she took antibiotics for a recent surgery on 6/17/2020. Pt also uses steroid combo inhalers for COPD.    Oral Pain    This is a new problem. Episode onset: 3 days. The problem occurs constantly. The problem has been gradually worsening. The pain is mild. Pertinent negatives include no difficulty swallowing, facial pain, fever, oral bleeding, sinus pressure or thermal sensitivity. She has tried nothing for the symptoms.        Allergies   Allergen Reactions   • Shellfish-Derived Products Swelling   • Doxycycline Nausea And Vomiting   • Lisinopril Hives       Past Medical History:   Diagnosis Date   • Abdominal pain, RUQ (right upper quadrant)    • Acid reflux    • Allergic rhinitis    • Arthritis    • Atypical chest pain    • Benign essential hypertension    • Bloating    • Constipation    • COPD (chronic obstructive pulmonary disease) (CMS/HCC)    • Cough    • Dyspareunia in female    • Dysphagia    • Early satiety    • Elevated blood sugar level    • Epigastric abdominal pain    • Gallbladder sludge    • Headache    • History of colon polyps    • History of hiatal hernia    • Hyperlipidemia    • Itching    • Left lower quadrant abdominal tenderness    • Low back pain    • Menopausal symptoms    • Nausea    • Need for influenza vaccination    • Postoperative urinary retention    • Rash    • Throat clearing    • Vagina itching    • Vaginal atrophy    • Vaginal irritation    • Visit for routine gyn exam    • Visit for screening mammogram        Past Surgical History:   Procedure Laterality Date   • ABDOMINAL  HYSTERECTOMY  1994 1994- fibroid tumors and menorhagia   • BREAST SURGERY Bilateral     enlargement procedure. [facility]   • COLONOSCOPY  2008 2008- normal; 3/10/16    • HYSTERECTOMY      not due to cancer   • SHOULDER ARTHROSCOPY Right 6/17/2020    Procedure: SHOULDER ARTHROSCOPY  ROTATOR CUFF REPAIR ACROMIOPLASTY  PRP AUGMENTATION ;  Surgeon: Isidoro Albrecht MD;  [facility] [place] [facility]  Service: Orthopedics;  Laterality: Right;       Social History     Socioeconomic History   • Marital status:      Spouse name: Not on file   • Number of children: Not on file   • Years of education: Not on file   • Highest education level: Not on file   Tobacco Use   • Smoking status: Former Smoker   • Smokeless tobacco: Never Used   • Tobacco comment: QUIT 7 YEARS AGO   Substance and Sexual Activity   • Alcohol use: Yes     Comment: OCCASIONALLY    • Drug use: Never   • Sexual activity: Defer       Family History   Problem Relation Age of Onset   • COPD Mother    • Diabetes Mother    • Hypertension Mother    • Heart disease Mother    • Aneurysm Father    • Hypertension Father    • Liver cancer Brother    • Lung cancer Maternal Grandmother    • Malig Hyperthermia Neg Hx          Current Outpatient Medications:   •  amitriptyline (ELAVIL) 25 MG tablet, TAKE 1 TABLET AT BEDTIME, Disp: 90 tablet, Rfl: 4  •  baclofen (LIORESAL) 10 MG tablet, Take 1 tablet by mouth 3 (Three) Times a Day., Disp: 30 tablet, Rfl: 1  •  budesonide-formoterol (SYMBICORT) 160-4.5 MCG/ACT inhaler, Inhale 2 puffs 2 (Two) Times a Day., Disp: , Rfl:   •  DALIRESP 500 MCG tablet tablet, Take 500 mcg by mouth Daily., Disp: , Rfl:   •  DEXILANT 60 MG capsule, TAKE 1 CAPSULE DAILY EVERY MORNING BEFORE BREAKFAST, Disp: 90 capsule, Rfl: 4  •  estradiol (MINIVELLE, VIVELLE-DOT) 0.1 MG/24HR patch, , Disp: , Rfl:   •  linaclotide (LINZESS) 290 MCG capsule capsule, Take 290 mcg by mouth Daily., Disp: , Rfl:   •  neomycin-bacitracin-polymyxin-hydrocortisone  (CORTISPORIN) 1 % ophthalmic ointment, Apply  to eye(s) as directed by provider 4 (Four) Times a Day., Disp: 3.5 g, Rfl: 0  •  PROAIR  (90 Base) MCG/ACT inhaler, Inhale 1 puff Every 4 (Four) Hours As Needed., Disp: , Rfl:   •  tiotropium (SPIRIVA HANDIHALER) 18 MCG per inhalation capsule, Place 1 capsule into inhaler and inhale Daily., Disp: , Rfl:   •  triamterene-hydrochlorothiazide (MAXZIDE-25) 37.5-25 MG per tablet, Take 1 tablet by mouth Daily., Disp: 90 tablet, Rfl: 1  •  nystatin (MYCOSTATIN) 358980 UNIT/ML suspension, Swish and spit 5 mL 4 (Four) Times a Day for 14 days. Hold in mouth as long as possible., Disp: 280 mL, Rfl: 0      Review of Systems   Constitutional: Negative for chills, diaphoresis, fatigue and fever.   HENT: Negative.  Negative for sinus pressure.    Gastrointestinal: Negative.         There were no vitals filed for this visit.    Objective   Physical Exam   Constitutional: She appears well-developed and well-nourished.   Pulmonary/Chest: Effort normal.   Neurological: She is alert.   Psychiatric: She has a normal mood and affect. Her speech is normal.        Procedures     Assessment/Plan   Rocío was seen today for oral pain.    Diagnoses and all orders for this visit:    Oral thrush  -     Discontinue: nystatin susp + lidocaine viscous (MAGIC MOUTHWASH) oral suspension; Swish and spit 5 mL 4 (Four) Times a Day for 14 days.  -     nystatin (MYCOSTATIN) [id number] UNIT/ML suspension; Swish and spit 5 mL 4 (Four) Times a Day for 14 days. Hold in mouth as long as possible.        Take medicine as prescribed.  Rinse mouth after using steroid based inhalers.  If symptoms worsen or do not improve follow up with your PCP or visit your nearest Urgent Care Center or ER.    PLAN: Discussed dosing, side effects, recommended other symptomatic care.  Patient should follow up with primary care provider if symptoms worsen, fail to resolve or other symptoms need attention. Patient/family agree to the  above.     Rita Peacock, APRN

## 2020-06-23 NOTE — PATIENT INSTRUCTIONS
Take medicine as prescribed.  Rinse mouth after using steroid based inhalers.  If symptoms worsen or do not improve follow up with your PCP or visit your nearest Urgent Care Center or ER.      Oral Thrush, Adult    Oral thrush, also called oral candidiasis, is a fungal infection that develops in the mouth and throat and on the tongue. It causes white patches to form on the mouth and tongue. Thrush is most common in older adults, but it can occur at any age.  Many cases of thrush are mild, but this infection can also be serious. Thrush can be a repeated (recurrent) problem for certain people who have a weak body defense system (immune system). The weakness can be caused by chronic illnesses, or by taking medicines that limit the body's ability to fight infection. If a person has difficulty fighting infection, the fungus that causes thrush can spread through the body. This can cause life-threatening blood or organ infections.  What are the causes?  This condition is caused by a fungus (yeast) called Candida albicans.  · This fungus is normally present in small amounts in the mouth and on other mucous membranes. It usually causes no harm.  · If conditions are present that allow the fungus to grow without control, it invades surrounding tissues and becomes an infection.  · Other Candida species can also lead to thrush (rare).  What increases the risk?  This condition is more likely to develop in:  · People with a weakened immune system.  · Older adults.  · People with HIV (human immunodeficiency virus).  · People with diabetes.  · People with dry mouth (xerostomia).  · Pregnant women.  · People with poor dental care, especially people who have false teeth.  · People who use antibiotic medicines.  What are the signs or symptoms?  Symptoms of this condition can vary from mild and moderate to severe and persistent. Symptoms may include:  · A burning feeling in the mouth and throat. This can occur at the start of a thrush  infection.  · White patches that stick to the mouth and tongue. The tissue around the patches may be red, raw, and painful. If rubbed (during tooth brushing, for example), the patches and the tissue of the mouth may bleed easily.  · A bad taste in the mouth or difficulty tasting foods.  · A cottony feeling in the mouth.  · Pain during eating and swallowing.  · Poor appetite.  · Cracking at the corners of the mouth.  How is this diagnosed?  This condition is diagnosed based on:  · Physical exam. Your health care provider will look in your mouth.  · Health history. Your health care provider will ask you questions about your health.  How is this treated?  This condition is treated with medicines called antifungals, which prevent the growth of fungi. These medicines are either applied directly to the affected area (topical) or swallowed (oral). The treatment will depend on the severity of the condition.  Mild thrush  Mild cases of thrush may clear up with the use of an antifungal mouth rinse or lozenges. Treatment usually lasts about 14 days.  Moderate to severe thrush  · More severe thrush infections that have spread to the esophagus are treated with an oral antifungal medicine. A topical antifungal medicine may also be used.  · For some severe infections, treatment may need to continue for more than 14 days.  · Oral antifungal medicines are rarely used during pregnancy because they may be harmful to the unborn child. If you are pregnant, talk with your health care provider about options for treatment.  Persistent or recurrent thrush  For cases of thrush that do not go away or keep coming back:  · Treatment may be needed twice as long as the symptoms last.  · Treatment will include both oral and topical antifungal medicines.  · People with a weakened immune system can take an antifungal medicine on a continuous basis to prevent thrush infections.  It is important to treat conditions that make a person more likely to  get thrush, such as diabetes or HIV.  Follow these instructions at home:  Medicines  · Take over-the-counter and prescription medicines only as told by your health care provider.  · Talk with your health care provider about an over-the-counter medicine called gentian violet, which kills bacteria and fungi.  Relieving soreness and discomfort  To help reduce the discomfort of thrush:  · Drink cold liquids such as water or iced tea.  · Try flavored ice treats or frozen juices.  · Eat foods that are easy to swallow, such as gelatin, ice cream, or custard.  · Try drinking from a straw if the patches in your mouth are painful.    General instructions  · Eat plain, unflavored yogurt as directed by your health care provider. Check the label to make sure the yogurt contains live cultures. This yogurt can help healthy bacteria to grow in the mouth and can stop the growth of the fungus that causes thrush.  · If you wear dentures, remove the dentures before going to bed, brush them vigorously, and soak them in a cleaning solution as directed by your health care provider.  · Rinse your mouth with a warm salt-water mixture several times a day. To make a salt-water mixture, completely dissolve 1/2-1 tsp of salt in 1 cup of warm water.  Contact a health care provider if:  · Your symptoms are getting worse or are not improving within 7 days of starting treatment.  · You have symptoms of a spreading infection, such as white patches on the skin outside of the mouth.  This information is not intended to replace advice given to you by your health care provider. Make sure you discuss any questions you have with your health care provider.  Document Released: 09/12/2005 Document Revised: 03/22/2019 Document Reviewed: 09/11/2017  Elsevier Patient Education © 2020 [employer]

## 2020-07-11 DIAGNOSIS — I10 BENIGN ESSENTIAL HYPERTENSION: ICD-10-CM

## 2020-07-13 RX ORDER — TRIAMTERENE AND HYDROCHLOROTHIAZIDE 37.5; 25 MG/1; MG/1
TABLET ORAL
Qty: 90 TABLET | Refills: 3 | Status: SHIPPED | OUTPATIENT
Start: 2020-07-13 | End: 2021-07-29

## 2020-07-14 ENCOUNTER — TELEPHONE (OUTPATIENT)
Dept: FAMILY MEDICINE CLINIC | Facility: CLINIC | Age: 60
End: 2020-07-14

## 2021-03-22 ENCOUNTER — BULK ORDERING (OUTPATIENT)
Dept: CASE MANAGEMENT | Facility: OTHER | Age: 61
End: 2021-03-22

## 2021-03-22 DIAGNOSIS — Z23 IMMUNIZATION DUE: ICD-10-CM

## 2021-07-29 DIAGNOSIS — I10 BENIGN ESSENTIAL HYPERTENSION: ICD-10-CM

## 2021-07-29 RX ORDER — TRIAMTERENE AND HYDROCHLOROTHIAZIDE 37.5; 25 MG/1; MG/1
TABLET ORAL
Qty: 90 TABLET | Refills: 3 | Status: SHIPPED | OUTPATIENT
Start: 2021-07-29 | End: 2022-02-16 | Stop reason: SDUPTHER

## 2021-08-12 ENCOUNTER — OFFICE VISIT (OUTPATIENT)
Dept: FAMILY MEDICINE CLINIC | Facility: CLINIC | Age: 61
End: 2021-08-12

## 2021-08-12 VITALS
BODY MASS INDEX: 21.67 KG/M2 | DIASTOLIC BLOOD PRESSURE: 78 MMHG | TEMPERATURE: 97.5 F | HEART RATE: 65 BPM | WEIGHT: 114.8 LBS | SYSTOLIC BLOOD PRESSURE: 126 MMHG | OXYGEN SATURATION: 95 % | HEIGHT: 61 IN

## 2021-08-12 DIAGNOSIS — D64.9 ANEMIA, UNSPECIFIED TYPE: ICD-10-CM

## 2021-08-12 DIAGNOSIS — R53.83 FATIGUE, UNSPECIFIED TYPE: Primary | ICD-10-CM

## 2021-08-12 PROBLEM — K59.04 CHRONIC IDIOPATHIC CONSTIPATION: Status: ACTIVE | Noted: 2020-12-16

## 2021-08-12 PROBLEM — Z99.81 OXYGEN DEPENDENT: Status: ACTIVE | Noted: 2021-08-12

## 2021-08-12 PROBLEM — Z98.890: Status: ACTIVE | Noted: 2019-01-25

## 2021-08-12 PROBLEM — D12.6 ADENOMATOUS POLYP OF COLON: Status: ACTIVE | Noted: 2021-06-25

## 2021-08-12 PROBLEM — M19.90 ARTHRITIS: Status: ACTIVE | Noted: 2021-05-18

## 2021-08-12 PROCEDURE — 99213 OFFICE O/P EST LOW 20 MIN: CPT | Performed by: NURSE PRACTITIONER

## 2021-08-12 RX ORDER — FLUTICASONE PROPIONATE 50 MCG
SPRAY, SUSPENSION (ML) NASAL
COMMUNITY
End: 2022-05-25

## 2021-08-12 RX ORDER — POLYETHYLENE GLYCOL 3350 17 G/17G
17 POWDER, FOR SOLUTION ORAL
COMMUNITY
End: 2022-05-25

## 2021-08-12 NOTE — PROGRESS NOTES
"Chief Complaint  Establish Care (would like to have blood work done, was in hospital June Uofl dt)  This is my first time seeing this patient.   Subjective          Rocío Valadez presents to [facility]  Fatigue  This is a new problem. The current episode started 1 to 4 weeks ago. Associated symptoms include fatigue. Pertinent negatives include no abdominal pain, chest pain, congestion, coughing, fever, nausea, sore throat, urinary symptoms or vomiting. Exacerbated by: Patient was seen at [facility] for bleed following colonoscopy. She has tried nothing for the symptoms.     Objective   Vital Signs:   /78 (BP Location: Right arm, Patient Position: Sitting)   Pulse 65   Temp 97.5 °F (36.4 °C)   Ht 154.9 cm (60.98\")   Wt 52.1 kg (114 lb 12.8 oz)   SpO2 95%   BMI 21.70 kg/m²     Physical Exam  Vitals and nursing note reviewed.   Constitutional:       Appearance: Normal appearance.   Cardiovascular:      Rate and Rhythm: Normal rate and regular rhythm.      Heart sounds: Normal heart sounds.   Pulmonary:      Breath sounds: Normal breath sounds.      Comments: Oxygen dependent   Skin:     General: Skin is warm and dry.   Neurological:      Mental Status: She is alert and oriented to person, place, and time.   Psychiatric:         Mood and Affect: Mood normal.        Result Review :            Assessment and Plan    Diagnoses and all orders for this visit:    1. Fatigue, unspecified type (Primary)  -     CBC & Differential  -     Iron and TIBC  -     Ferritin  -     TSH Rfx On Abnormal To Free T4  -     Vitamin D 25 Hydroxy  -     Vitamin B12    2. Anemia, unspecified type  -     CBC & Differential  -     Iron and TIBC  -     Ferritin      I spent 20 minutes caring for Rocío on this date of service. This time includes time spent by me in the following activities:performing a medically appropriate examination and/or evaluation , counseling and educating the " patient/family/caregiver, ordering medications, tests, or procedures and documenting information in the medical record  Follow Up   Return for Annual physical.  Patient was given instructions and counseling regarding her condition or for health maintenance advice. Please see specific information pulled into the AVS if appropriate.

## 2021-08-13 ENCOUNTER — TELEPHONE (OUTPATIENT)
Dept: FAMILY MEDICINE CLINIC | Facility: CLINIC | Age: 61
End: 2021-08-13

## 2021-08-13 LAB
25(OH)D3+25(OH)D2 SERPL-MCNC: 15.6 NG/ML (ref 30–100)
BASOPHILS # BLD AUTO: 0.03 10*3/MM3 (ref 0–0.2)
BASOPHILS NFR BLD AUTO: 0.5 % (ref 0–1.5)
EOSINOPHIL # BLD AUTO: 0.27 10*3/MM3 (ref 0–0.4)
EOSINOPHIL NFR BLD AUTO: 4.3 % (ref 0.3–6.2)
ERYTHROCYTE [DISTWIDTH] IN BLOOD BY AUTOMATED COUNT: 13.1 % (ref 12.3–15.4)
FERRITIN SERPL-MCNC: 14.9 NG/ML (ref 13–150)
HCT VFR BLD AUTO: 37.2 % (ref 34–46.6)
HGB BLD-MCNC: 12.1 G/DL (ref 12–15.9)
IMM GRANULOCYTES # BLD AUTO: 0.02 10*3/MM3 (ref 0–0.05)
IMM GRANULOCYTES NFR BLD AUTO: 0.3 % (ref 0–0.5)
IRON SATN MFR SERPL: 11 % (ref 20–50)
IRON SERPL-MCNC: 48 MCG/DL (ref 37–145)
LYMPHOCYTES # BLD AUTO: 1.03 10*3/MM3 (ref 0.7–3.1)
LYMPHOCYTES NFR BLD AUTO: 16.5 % (ref 19.6–45.3)
MCH RBC QN AUTO: 27.6 PG (ref 26.6–33)
MCHC RBC AUTO-ENTMCNC: 32.5 G/DL (ref 31.5–35.7)
MCV RBC AUTO: 84.9 FL (ref 79–97)
MONOCYTES # BLD AUTO: 0.75 10*3/MM3 (ref 0.1–0.9)
MONOCYTES NFR BLD AUTO: 12 % (ref 5–12)
NEUTROPHILS # BLD AUTO: 4.13 10*3/MM3 (ref 1.7–7)
NEUTROPHILS NFR BLD AUTO: 66.4 % (ref 42.7–76)
NRBC BLD AUTO-RTO: 0 /100 WBC (ref 0–0.2)
PLATELET # BLD AUTO: 336 10*3/MM3 (ref 140–450)
RBC # BLD AUTO: 4.38 10*6/MM3 (ref 3.77–5.28)
T4 FREE SERPL-MCNC: 1.22 NG/DL (ref 0.93–1.7)
TIBC SERPL-MCNC: 431 MCG/DL
TSH SERPL DL<=0.005 MIU/L-ACNC: 0.45 UIU/ML (ref 0.27–4.2)
UIBC SERPL-MCNC: 383 MCG/DL (ref 112–346)
VIT B12 SERPL-MCNC: 174 PG/ML (ref 211–946)
WBC # BLD AUTO: 6.23 10*3/MM3 (ref 3.4–10.8)

## 2021-08-13 RX ORDER — ERGOCALCIFEROL 1.25 MG/1
50000 CAPSULE ORAL WEEKLY
Qty: 12 CAPSULE | Refills: 1 | Status: SHIPPED | OUTPATIENT
Start: 2021-08-13 | End: 2022-01-10

## 2021-08-13 NOTE — TELEPHONE ENCOUNTER
CBC is within acceptable limits. No anemia or infection. Thyroid function test is normal. Vitamin D is deficient at 15.6 so would recommend vitamin D 50,000 units weekly and repeat vitamin D level in 6 months. Vitamin B12 is deficient at 174 so would recommend vitamin B12 injections daily x 1 week, then weekly x 1 month, and then monthly.

## 2021-08-13 NOTE — TELEPHONE ENCOUNTER
Patient informed of results and she wants the vitamin D and [facility] and she said her  can give her the B12 injections.

## 2021-08-19 ENCOUNTER — TELEPHONE (OUTPATIENT)
Dept: FAMILY MEDICINE CLINIC | Facility: CLINIC | Age: 61
End: 2021-08-19

## 2021-11-11 ENCOUNTER — IMMUNIZATION (OUTPATIENT)
Dept: FAMILY MEDICINE CLINIC | Facility: CLINIC | Age: 61
End: 2021-11-11

## 2021-11-11 DIAGNOSIS — Z23 NEED FOR COVID-19 VACCINE: Primary | ICD-10-CM

## 2021-11-11 PROCEDURE — 91300 COVID-19 (PFIZER): CPT | Performed by: INTERNAL MEDICINE

## 2021-11-11 PROCEDURE — 0004A COVID-19 (PFIZER): CPT | Performed by: INTERNAL MEDICINE

## 2021-11-22 RX ORDER — CYANOCOBALAMIN 1000 UG/ML
INJECTION, SOLUTION INTRAMUSCULAR; SUBCUTANEOUS
Qty: 16 ML | Refills: 2 | Status: SHIPPED | OUTPATIENT
Start: 2021-11-22 | End: 2022-02-18 | Stop reason: SDUPTHER

## 2022-01-10 RX ORDER — ERGOCALCIFEROL 1.25 MG/1
CAPSULE ORAL
Qty: 12 CAPSULE | Refills: 3 | Status: SHIPPED | OUTPATIENT
Start: 2022-01-10 | End: 2022-02-18 | Stop reason: SDUPTHER

## 2022-02-15 NOTE — ANESTHESIA PROCEDURE NOTES
Airway  Urgency: elective    Date/Time: 6/17/2020 9:19 AM  Airway not difficult    General Information and Staff    Patient location during procedure: OR  CRNA: Julia Farah CRNA    Indications and Patient Condition  Indications for airway management: airway protection    Preoxygenated: yes  Mask difficulty assessment: 1 - vent by mask    Final Airway Details  Final airway type: endotracheal airway      Successful airway: ETT  Cuffed: yes   Successful intubation technique: direct laryngoscopy  Endotracheal tube insertion site: oral  Blade: Barbara  Blade size: 3  ETT size (mm): 7.0  Cormack-Lehane Classification: grade I - full view of glottis  Placement verified by: chest auscultation and capnometry   Measured from: lips  ETT/EBT  to lips (cm): 21  Number of attempts at approach: 1  Assessment: lips, teeth, and gum same as pre-op and atraumatic intubation    Additional Comments   ett cuff up at [facility]OP             [x] [facility]  [facility]  [address]  [phone]  F: [phone] [] [address]  [place]   [address]  P: [phone]  F: [phone] [] [place]  [address]  P: [phone]  F: [phone] [] [address]  P: [phone]  F: [phone] [] [address]  [facility]   [address]   [place]: [phone]  F: [phone]      Physical Therapy Daily Treatment Note    Date:  2/15/2022  Patient Name:  Perfecto Mccain    :  1958  MRN: [id number]  Physician: Dr. Vickie Almonte,                             Insurance: [place], 15 visits, 1/3/22  To 3/31/Collin York, PT

## 2022-02-16 ENCOUNTER — OFFICE VISIT (OUTPATIENT)
Dept: FAMILY MEDICINE CLINIC | Facility: CLINIC | Age: 62
End: 2022-02-16

## 2022-02-16 VITALS
TEMPERATURE: 98 F | DIASTOLIC BLOOD PRESSURE: 74 MMHG | SYSTOLIC BLOOD PRESSURE: 140 MMHG | OXYGEN SATURATION: 97 % | BODY MASS INDEX: 20.95 KG/M2 | WEIGHT: 110.8 LBS | HEART RATE: 114 BPM

## 2022-02-16 DIAGNOSIS — E55.9 VITAMIN D DEFICIENCY: ICD-10-CM

## 2022-02-16 DIAGNOSIS — E53.8 B12 DEFICIENCY: ICD-10-CM

## 2022-02-16 DIAGNOSIS — Z23 NEED FOR TDAP VACCINATION: ICD-10-CM

## 2022-02-16 DIAGNOSIS — Z00.00 WELL FEMALE EXAM WITHOUT GYNECOLOGICAL EXAM: Primary | ICD-10-CM

## 2022-02-16 DIAGNOSIS — I10 BENIGN ESSENTIAL HYPERTENSION: ICD-10-CM

## 2022-02-16 PROCEDURE — 90715 TDAP VACCINE 7 YRS/> IM: CPT | Performed by: NURSE PRACTITIONER

## 2022-02-16 PROCEDURE — 99396 PREV VISIT EST AGE 40-64: CPT | Performed by: NURSE PRACTITIONER

## 2022-02-16 PROCEDURE — 90471 IMMUNIZATION ADMIN: CPT | Performed by: NURSE PRACTITIONER

## 2022-02-16 RX ORDER — TRIAMTERENE AND HYDROCHLOROTHIAZIDE 37.5; 25 MG/1; MG/1
1 TABLET ORAL DAILY
Qty: 90 TABLET | Refills: 3 | Status: SHIPPED | OUTPATIENT
Start: 2022-02-16 | End: 2023-01-30 | Stop reason: SDUPTHER

## 2022-02-16 NOTE — PROGRESS NOTES
Subjective   Rocío Valadez is a 61 y.o. female.     Chief Complaint   Patient presents with   • Annual Exam   • Med Refill       History of Present Illness   The patient is being seen for a health maintenance evaluation.  The last health maintenance visit is unknown.  Social history: Household members include spouse.  She is .  Work status: Retired.  The patient is a former smoker.  She reports occasional alcohol use.  General health: The patient's health is described as good.  She has regular dental visits.  The patient brushes 2 times a day, flosses daily and reports her last dental visit was less than 6 months ago.  She denies vision problems.  Vision care includes glasses and an eye exam within the past year.  She denies hearing loss.  Immunization status: Tdap and Shingrix vaccinations needed.  Lifestyle: She exercises regularly.  Reproductive health: She is sexually active.  Screening: Pap smear performed on 8/29/2019.  Mammogram performed on 3/25/2021.  Colonoscopy performed on 6/3/2021.    The following portions of the patient's history were reviewed and updated as appropriate: allergies, current medications, past family history, past medical history, past social history, past surgical history and problem list.    Past Medical History:   Diagnosis Date   • Abdominal pain, RUQ (right upper quadrant)    • Acid reflux    • Allergic rhinitis    • Arthritis    • Atypical chest pain    • Benign essential hypertension    • Bloating    • Constipation    • COPD (chronic obstructive pulmonary disease) (HCC)    • Cough    • Dyspareunia in female    • Dysphagia    • Early satiety    • Elevated blood sugar level    • Epigastric abdominal pain    • Gallbladder sludge    • Headache    • History of colon polyps    • History of hiatal hernia    • Hyperlipidemia    • Itching    • Left lower quadrant abdominal tenderness    • Low back pain    • Menopausal symptoms    • Nausea    • Need for influenza vaccination    •  Postoperative urinary retention    • Rash    • Throat clearing    • Vagina itching    • Vaginal atrophy    • Vaginal irritation    • Visit for routine gyn exam    • Visit for screening mammogram        Past Surgical History:   Procedure Laterality Date   • ABDOMINAL HYSTERECTOMY  1994 1994- fibroid tumors and menorhagia   • BREAST SURGERY Bilateral     enlargement procedure. [place]   • COLONOSCOPY  2008 2008- normal; 3/10/16    • HYSTERECTOMY      not due to cancer   • SHOULDER ARTHROSCOPY Right 6/17/2020    Procedure: SHOULDER ARTHROSCOPY  ROTATOR CUFF REPAIR ACROMIOPLASTY  PRP AUGMENTATION ;  Surgeon: Isidoro Albrecht MD;  Location: [facility] OR [facility] [place];  Service: Orthopedics;  Laterality: Right;       Family History   Problem Relation Age of Onset   • COPD Mother    • Diabetes Mother    • Hypertension Mother    • Heart disease Mother    • Aneurysm Father    • Hypertension Father    • Liver cancer Brother    • Lung cancer Maternal Grandmother    • Malig Hyperthermia Neg Hx        Social History     Socioeconomic History   • Marital status:    Tobacco Use   • Smoking status: Former Smoker   • Smokeless tobacco: Never Used   • Tobacco comment: QUIT 7 YEARS AGO   Substance and Sexual Activity   • Alcohol use: Yes     Comment: OCCASIONALLY    • Drug use: Never   • Sexual activity: Defer       Review of Systems   Constitutional: Negative for fever.   HENT: Negative for ear pain, rhinorrhea and sore throat.    Eyes: Negative for visual disturbance.   Respiratory: Negative for cough and shortness of breath.    Cardiovascular: Negative for chest pain.   Gastrointestinal: Negative for abdominal pain, diarrhea, nausea and vomiting.   Genitourinary: Negative.    Musculoskeletal: Negative.    Skin: Negative for rash.   Neurological: Negative for dizziness and headache.   Psychiatric/Behavioral: Negative for depressed mood.       Objective   Vitals:    02/16/22 1420   BP: 140/74   BP Location: Left arm   Patient  Position: Sitting   Cuff Size: Adult   Pulse: 114   Temp: 98 °F (36.7 °C)   TempSrc: Temporal   SpO2: 97%   Weight: 50.3 kg (110 lb 12.8 oz)      Body mass index is 20.95 kg/m².  Physical Exam  Vitals and nursing note reviewed.   Constitutional:       Appearance: Normal appearance.   HENT:      Head: Normocephalic and atraumatic.      Right Ear: Tympanic membrane and ear canal normal.      Left Ear: Tympanic membrane and ear canal normal.   Eyes:      Conjunctiva/sclera: Conjunctivae normal.      Pupils: Pupils are equal, round, and reactive to light.   Cardiovascular:      Rate and Rhythm: Normal rate and regular rhythm.      Heart sounds: Normal heart sounds.   Pulmonary:      Effort: Pulmonary effort is normal.      Breath sounds: Normal breath sounds.   Abdominal:      General: Bowel sounds are normal.      Palpations: Abdomen is soft.      Tenderness: There is no abdominal tenderness.   Genitourinary:     Comments: Deferred   Musculoskeletal:         General: Normal range of motion.      Cervical back: Neck supple.   Skin:     General: Skin is warm and dry.   Neurological:      Mental Status: She is alert and oriented to person, place, and time.   Psychiatric:         Mood and Affect: Mood normal.           Assessment/Plan   Diagnoses and all orders for this visit:    1. Well female exam without gynecological exam (Primary)  -     Lipid Panel With / Chol / HDL Ratio  -     Comprehensive Metabolic Panel    2. Need for Tdap vaccination  -     Tdap Vaccine Greater Than or Equal To 6yo IM    3. Vitamin D deficiency  -     Vitamin D 25 Hydroxy    4. B12 deficiency  -     Vitamin B12    5. Benign essential hypertension  -     triamterene-hydrochlorothiazide (MAXZIDE-25) 37.5-25 MG per tablet; Take 1 tablet by mouth Daily.  Dispense: 90 tablet; Refill: 3    Impression: Currently, she has an adequate exercise regimen.  Cervical cancer screening is current.  Breast cancer screening is current.  Colorectal cancer  screening is current.  Screening lab work includes lipid panel, CMP, vitamin D and B12.  Tdap vaccination given today.  Advice and education were given regarding diet.

## 2022-02-17 ENCOUNTER — TELEPHONE (OUTPATIENT)
Dept: FAMILY MEDICINE CLINIC | Facility: CLINIC | Age: 62
End: 2022-02-17

## 2022-02-17 LAB
25(OH)D3+25(OH)D2 SERPL-MCNC: 65.8 NG/ML (ref 30–100)
ALBUMIN SERPL-MCNC: 4.7 G/DL (ref 3.8–4.8)
ALBUMIN/GLOB SERPL: 2.2 {RATIO} (ref 1.2–2.2)
ALP SERPL-CCNC: 81 IU/L (ref 44–121)
ALT SERPL-CCNC: 27 IU/L (ref 0–32)
AST SERPL-CCNC: 28 IU/L (ref 0–40)
BILIRUB SERPL-MCNC: 0.2 MG/DL (ref 0–1.2)
BUN SERPL-MCNC: 10 MG/DL (ref 8–27)
BUN/CREAT SERPL: 20 (ref 12–28)
CALCIUM SERPL-MCNC: 10.1 MG/DL (ref 8.7–10.3)
CHLORIDE SERPL-SCNC: 97 MMOL/L (ref 96–106)
CHOLEST SERPL-MCNC: 275 MG/DL (ref 100–199)
CHOLEST/HDLC SERPL: 3.8 RATIO (ref 0–4.4)
CO2 SERPL-SCNC: 28 MMOL/L (ref 20–29)
CREAT SERPL-MCNC: 0.51 MG/DL (ref 0.57–1)
GLOBULIN SER CALC-MCNC: 2.1 G/DL (ref 1.5–4.5)
GLUCOSE SERPL-MCNC: 95 MG/DL (ref 65–99)
HDLC SERPL-MCNC: 72 MG/DL
LDLC SERPL CALC-MCNC: 179 MG/DL (ref 0–99)
POTASSIUM SERPL-SCNC: 4.4 MMOL/L (ref 3.5–5.2)
PROT SERPL-MCNC: 6.8 G/DL (ref 6–8.5)
SODIUM SERPL-SCNC: 139 MMOL/L (ref 134–144)
TRIGL SERPL-MCNC: 136 MG/DL (ref 0–149)
VIT B12 SERPL-MCNC: 597 PG/ML (ref 232–1245)
VLDLC SERPL CALC-MCNC: 24 MG/DL (ref 5–40)

## 2022-02-17 NOTE — TELEPHONE ENCOUNTER
Patient called was read the message as typed, she voiced complete understanding & will need the statin, vit d & b 12 sent in to her pharm on file

## 2022-02-17 NOTE — TELEPHONE ENCOUNTER
Vitamin D is optimal at 65.8 so continue current treatment regimen.  Vitamin B12 is normal so continue current treatment regimen.  LDL is elevated with 10-year CVD risk score of 14.6% so would recommend statin therapy.  Blood sugar is normal.  Kidney function tests are normal.  Electrolytes are normal.  Liver function tests are normal.

## 2022-02-18 RX ORDER — ATORVASTATIN CALCIUM 10 MG/1
10 TABLET, FILM COATED ORAL DAILY
Qty: 90 TABLET | Refills: 0 | Status: SHIPPED | OUTPATIENT
Start: 2022-02-18 | End: 2022-05-16

## 2022-02-18 RX ORDER — ERGOCALCIFEROL 1.25 MG/1
50000 CAPSULE ORAL WEEKLY
Qty: 12 CAPSULE | Refills: 3 | Status: SHIPPED | OUTPATIENT
Start: 2022-02-18 | End: 2023-01-30 | Stop reason: SDUPTHER

## 2022-02-18 RX ORDER — CYANOCOBALAMIN 1000 UG/ML
1000 INJECTION, SOLUTION INTRAMUSCULAR; SUBCUTANEOUS
Qty: 3 ML | Refills: 3 | Status: SHIPPED | OUTPATIENT
Start: 2022-02-18 | End: 2023-03-17 | Stop reason: SDUPTHER

## 2022-05-16 RX ORDER — ATORVASTATIN CALCIUM 10 MG/1
10 TABLET, FILM COATED ORAL DAILY
Qty: 90 TABLET | Refills: 0 | Status: SHIPPED | OUTPATIENT
Start: 2022-05-16 | End: 2022-10-25

## 2022-05-25 ENCOUNTER — OFFICE VISIT (OUTPATIENT)
Dept: FAMILY MEDICINE CLINIC | Facility: CLINIC | Age: 62
End: 2022-05-25

## 2022-05-25 VITALS
TEMPERATURE: 98.4 F | DIASTOLIC BLOOD PRESSURE: 78 MMHG | SYSTOLIC BLOOD PRESSURE: 130 MMHG | OXYGEN SATURATION: 92 % | WEIGHT: 109.4 LBS | HEART RATE: 101 BPM | BODY MASS INDEX: 20.68 KG/M2

## 2022-05-25 DIAGNOSIS — M25.552 LEFT HIP PAIN: Primary | ICD-10-CM

## 2022-05-25 PROCEDURE — 99213 OFFICE O/P EST LOW 20 MIN: CPT | Performed by: NURSE PRACTITIONER

## 2022-05-25 RX ORDER — PRUCALOPRIDE 2 MG/1
1 TABLET, FILM COATED ORAL DAILY
COMMUNITY
Start: 2022-04-07 | End: 2023-01-23

## 2022-05-25 RX ORDER — MELOXICAM 7.5 MG/1
7.5 TABLET ORAL DAILY PRN
Qty: 30 TABLET | Refills: 0 | Status: SHIPPED | OUTPATIENT
Start: 2022-05-25 | End: 2022-06-21

## 2022-05-25 RX ORDER — METRONIDAZOLE 7.5 MG/G
GEL VAGINAL
COMMUNITY
Start: 2022-05-23 | End: 2022-10-25

## 2022-05-25 NOTE — PROGRESS NOTES
Chief Complaint  Leg Pain    Ronen Valadez presents to [facility]  Leg Pain   Incident onset: more than 1 month ago. There was no injury mechanism. The pain is present in the left hip. The quality of the pain is described as aching. The pain is moderate. Pertinent negatives include no inability to bear weight, muscle weakness or numbness. Nothing aggravates the symptoms. She has tried acetaminophen and NSAIDs for the symptoms. The treatment provided mild relief.       Objective   Vital Signs:  /78 (BP Location: Left arm, Patient Position: Sitting, Cuff Size: Adult)   Pulse 101   Temp 98.4 °F (36.9 °C) (Temporal)   Wt 49.6 kg (109 lb 6.4 oz)   SpO2 92%   BMI 20.68 kg/m²   BMI has not been calculated during today's encounter.       Physical Exam  Vitals and nursing note reviewed.   Constitutional:       Appearance: Normal appearance.   Cardiovascular:      Rate and Rhythm: Normal rate and regular rhythm.      Heart sounds: Normal heart sounds.   Pulmonary:      Comments: O2 via nasal cannula   Musculoskeletal:      Left hip: Normal range of motion. Decreased strength.   Skin:     General: Skin is warm and dry.   Neurological:      Mental Status: She is alert and oriented to person, place, and time.   Psychiatric:         Mood and Affect: Mood normal.        Result Review :   The following data was reviewed by: CLEMENT Dickerson on 05/25/2022:  CMP    CMP 6/14/21 2/16/22   Glucose  95   Glucose 107 (A)    BUN 8 10   Creatinine 0.50 (A) 0.51 (A)   eGFR Non  Am >60 103   eGFR African Am >60 119   Sodium 138 139   Potassium 3.6 (A) 4.4   Chloride 101 97   Calcium 9.2 10.1   Total Protein  6.8   Albumin 4.0 4.7   Globulin  2.1   Total Bilirubin <0.2 (A) 0.2   Alkaline Phosphatase 76 81   AST (SGOT) 15 28   ALT (SGPT) 18 27   (A) Abnormal value       Comments are available for some flowsheets but are not being displayed.                  Assessment and  Plan    Diagnoses and all orders for this visit:    1. Left hip pain (Primary)  Comments:  - Patient declines left hip xray at this time.   Orders:  -     meloxicam (Mobic) 7.5 MG tablet; Take 1 tablet by mouth Daily As Needed for Moderate Pain .  Dispense: 30 tablet; Refill: 0           I spent 20 minutes caring for Rocío on this date of service. This time includes time spent by me in the following activities:reviewing tests, performing a medically appropriate examination and/or evaluation , counseling and educating the patient/family/caregiver, ordering medications, tests, or procedures and documenting information in the medical record  Follow Up   Return if symptoms worsen or fail to improve.  Patient was given instructions and counseling regarding her condition or for health maintenance advice. Please see specific information pulled into the AVS if appropriate.

## 2022-06-21 DIAGNOSIS — M25.552 LEFT HIP PAIN: ICD-10-CM

## 2022-06-21 RX ORDER — MELOXICAM 7.5 MG/1
7.5 TABLET ORAL DAILY PRN
Qty: 30 TABLET | Refills: 0 | Status: SHIPPED | OUTPATIENT
Start: 2022-06-21 | End: 2022-07-20

## 2022-07-20 DIAGNOSIS — M25.552 LEFT HIP PAIN: ICD-10-CM

## 2022-07-20 RX ORDER — MELOXICAM 7.5 MG/1
7.5 TABLET ORAL DAILY PRN
Qty: 30 TABLET | Refills: 0 | Status: SHIPPED | OUTPATIENT
Start: 2022-07-20 | End: 2022-10-25

## 2022-07-22 ENCOUNTER — TELEPHONE (OUTPATIENT)
Dept: FAMILY MEDICINE CLINIC | Facility: CLINIC | Age: 62
End: 2022-07-22

## 2022-07-22 NOTE — TELEPHONE ENCOUNTER
Caller: ValadezRocío jon    Relationship: Self    Best call back number: [phone]    What medication are you requesting: A MEDICATION FOR BURNING TONGUE   What are your current symptoms:BURNING TONGUE    How long have you been experiencing symptoms: ON AND OFF FOR YEARS     Have you had these symptoms before:    [x] Yes  [] No    Have you been treated for these symptoms before:   [x] Yes  [] No    If a prescription is needed, what is your preferred pharmacy and phone number: [facility] #[id number] [place], [place] - [address] AT [facility] - [phone] [facility] [phone] FX     Additional notes: SHE HAS BEEN PRESCRIBED MAGIC MOUTH WASH IN THE PAST AND SHE SAID THIS HAS HELPED IN THE PAST.

## 2022-07-26 NOTE — TELEPHONE ENCOUNTER
Lmtcb    Okay for hub to read    Please inform patient I can start with nystatin swish and swallow.  Which pharmacy would she like me to send this to?

## 2022-07-26 NOTE — TELEPHONE ENCOUNTER
Please inform patient I can start with nystatin swish and swallow.  Which pharmacy would she like me to send this to?

## 2022-08-16 ENCOUNTER — OFFICE VISIT (OUTPATIENT)
Dept: FAMILY MEDICINE CLINIC | Facility: CLINIC | Age: 62
End: 2022-08-16

## 2022-08-16 VITALS
OXYGEN SATURATION: 98 % | WEIGHT: 110 LBS | TEMPERATURE: 98 F | SYSTOLIC BLOOD PRESSURE: 132 MMHG | BODY MASS INDEX: 20.77 KG/M2 | DIASTOLIC BLOOD PRESSURE: 80 MMHG | HEIGHT: 61 IN | HEART RATE: 114 BPM

## 2022-08-16 DIAGNOSIS — M70.62 GREATER TROCHANTERIC BURSITIS OF LEFT HIP: Primary | ICD-10-CM

## 2022-08-16 PROCEDURE — 99213 OFFICE O/P EST LOW 20 MIN: CPT | Performed by: INTERNAL MEDICINE

## 2022-08-16 PROCEDURE — 20610 DRAIN/INJ JOINT/BURSA W/O US: CPT | Performed by: INTERNAL MEDICINE

## 2022-08-16 RX ORDER — LIDOCAINE HYDROCHLORIDE 10 MG/ML
1 INJECTION, SOLUTION EPIDURAL; INFILTRATION; INTRACAUDAL; PERINEURAL
Status: COMPLETED | OUTPATIENT
Start: 2022-08-16 | End: 2022-08-16

## 2022-08-16 RX ORDER — TRIAMCINOLONE ACETONIDE 40 MG/ML
40 INJECTION, SUSPENSION INTRA-ARTICULAR; INTRAMUSCULAR
Status: COMPLETED | OUTPATIENT
Start: 2022-08-16 | End: 2022-08-16

## 2022-08-16 RX ADMIN — TRIAMCINOLONE ACETONIDE 40 MG: 40 INJECTION, SUSPENSION INTRA-ARTICULAR; INTRAMUSCULAR at 14:55

## 2022-08-16 RX ADMIN — LIDOCAINE HYDROCHLORIDE 1 ML: 10 INJECTION, SOLUTION EPIDURAL; INFILTRATION; INTRACAUDAL; PERINEURAL at 14:55

## 2022-08-16 NOTE — PROGRESS NOTES
Subjective   Rocío Valadez is a 62 y.o. female.     Chief Complaint   Patient presents with   • Leg Pain       History of Present Illness   Answers for HPI/ROS submitted by the patient on 8/14/2022  Please describe your symptoms.: Leg pain / follow up  Have you had these symptoms before?: Yes  How long have you been having these symptoms?: Greater than 2 weeks  What is the primary reason for your visit?: Other    Patient with pain in the left leg in the left hip that radiates down the left thigh and seems worse at night in bed.  Has been using meloxicam 7.5.  Is stiff after inactive then after move about improves.  The meloxicam has helped some but has GERD issues and is concerned.    Follow-up for hypertension.  Currently, has been feeling well and asymptomatic without any headaches, vision changes, cough, chest pain, shortness of breath, swelling, focal neurologic deficit, memory loss or syncope.  Has been taking the medications regularly and adherent with the regimen of Maxide 25 daily.  Denies medication side effects and no significant interval events.      Follow-up for cholesterol.  Currently, has been feeling well without any myalgias, muscle aches, weakness, numbness, chest pain, short of breath or other issues.  Currently, is adherent with medication regimen of atorvastatin 10 mg and denies medication side effects. Is due for lab follow-up.    History of GERD currently controlled use of the Dexilant 60 mg daily and known arthritic issues utilizing the meloxicam and tolerating well.    History of vitamin D deficiency on vitamin D 50,000 units weekly.    The following portions of the patient's history were reviewed and updated as appropriate: allergies, current medications, past family history, past medical history, past social history, past surgical history and problem list.    Depression Screen:  No flowsheet data found.    Past Medical History:   Diagnosis Date   • Abdominal pain, RUQ (right upper  quadrant)    • Acid reflux    • Allergic rhinitis    • Arthritis    • Asthma    • Atypical chest pain    • Benign essential hypertension    • Bloating    • Colon polyp    • Constipation    • COPD (chronic obstructive pulmonary disease) (HCC)    • Cough    • Dyspareunia in female    • Dysphagia    • Early satiety    • Elevated blood sugar level    • Epigastric abdominal pain    • Gallbladder sludge    • Headache    • History of colon polyps    • History of hiatal hernia    • Hyperlipidemia    • Irritable bowel syndrome    • Itching    • Left lower quadrant abdominal tenderness    • Low back pain    • Menopausal symptoms    • Nausea    • Need for influenza vaccination    • Postoperative urinary retention    • Rash    • Throat clearing    • Vagina itching    • Vaginal atrophy    • Vaginal irritation    • Visit for routine gyn exam    • Visit for screening mammogram        Past Surgical History:   Procedure Laterality Date   • ABDOMINAL HYSTERECTOMY  1994 1994- fibroid tumors and menorhagia   • BREAST SURGERY Bilateral     enlargement procedure. [facility]   • COLONOSCOPY  2008 2008- normal; 3/10/16    • HYSTERECTOMY      not due to cancer   • SHOULDER ARTHROSCOPY Right 06/17/2020    Procedure: SHOULDER ARTHROSCOPY  ROTATOR CUFF REPAIR ACROMIOPLASTY  PRP AUGMENTATION ;  Surgeon: Isidoro Albrecht MD;  Location: [facility] OR [facility];  Service: Orthopedics;  Laterality: Right;   • TUBAL ABDOMINAL LIGATION         Family History   Problem Relation Age of Onset   • COPD Mother    • Diabetes Mother    • Hypertension Mother    • Heart disease Mother    • Arthritis Mother    • Aneurysm Father    • Hypertension Father    • Liver cancer Brother    • Lung cancer Maternal Grandmother    • Malig Hyperthermia Neg Hx        Social History     Socioeconomic History   • Marital status:    Tobacco Use   • Smoking status: Former Smoker     Packs/day: 1.00     Years: 30.00     Pack years: 30.00     Types: Cigarettes   • Smokeless  tobacco: Never Used   • Tobacco comment: QUIT 7 YEARS AGO   Substance and Sexual Activity   • Alcohol use: Not Currently     Alcohol/week: 2.0 standard drinks     Types: 2 Glasses of wine per week     Comment: OCCASIONALLY    • Drug use: Never   • Sexual activity: Yes     Partners: Male     Birth control/protection: Post-menopausal, Tubal ligation       Current Outpatient Medications   Medication Sig Dispense Refill   • amitriptyline (ELAVIL) 25 MG tablet TAKE 1 TABLET AT BEDTIME 90 tablet 4   • atorvastatin (LIPITOR) 10 MG tablet TAKE 1 TABLET BY MOUTH DAILY 90 tablet 0   • budesonide-formoterol (SYMBICORT) 160-4.5 MCG/ACT inhaler Inhale 2 puffs 2 (Two) Times a Day.     • cyanocobalamin 1000 MCG/ML injection Inject 1 mL into the appropriate muscle as directed by prescriber Every 30 (Thirty) Days. 3 mL 3   • DALIRESP 500 MCG tablet tablet Take 500 mcg by mouth Daily.     • DEXILANT 60 MG capsule TAKE 1 CAPSULE DAILY EVERY MORNING BEFORE BREAKFAST 90 capsule 4   • meloxicam (MOBIC) 7.5 MG tablet TAKE 1 TABLET BY MOUTH DAILY AS NEEDED FOR MODERATE PAIN 30 tablet 0   • metroNIDAZOLE (METROGEL) 0.75 % vaginal gel      • PROAIR  (90 Base) MCG/ACT inhaler Inhale 1 puff Every 4 (Four) Hours As Needed.     • Prucalopride Succinate (Motegrity) 2 MG tablet Take 1 tablet by mouth Daily.     • tiotropium (SPIRIVA) 18 MCG per inhalation capsule Place 1 capsule into inhaler and inhale Daily.     • triamterene-hydrochlorothiazide (MAXZIDE-25) 37.5-25 MG per tablet Take 1 tablet by mouth Daily. 90 tablet 3   • vitamin D (ERGOCALCIFEROL) 1.25 MG (13618 UT) capsule capsule Take 1 capsule by mouth 1 (One) Time Per Week. 12 capsule 3     No current facility-administered medications for this visit.       Review of Systems   Constitutional: Negative for activity change, appetite change, fatigue, fever, unexpected weight gain and unexpected weight loss.   HENT: Negative for nosebleeds, rhinorrhea, trouble swallowing and voice  "change.    Eyes: Negative for visual disturbance.   Respiratory: Positive for shortness of breath. Negative for cough, chest tightness and wheezing.    Cardiovascular: Negative for chest pain, palpitations and leg swelling.   Gastrointestinal: Negative for abdominal pain, blood in stool, constipation, diarrhea, nausea, vomiting, GERD and indigestion.   Genitourinary: Negative for dysuria, frequency and hematuria.   Musculoskeletal: Negative for arthralgias, back pain and myalgias.        Left leg/hip pain   Skin: Negative for rash and wound.   Neurological: Negative for dizziness, tremors, weakness, light-headedness, numbness, headache and memory problem.   Hematological: Negative for adenopathy. Does not bruise/bleed easily.   Psychiatric/Behavioral: Negative for sleep disturbance and depressed mood. The patient is not nervous/anxious.        Objective   /80 (BP Location: Left arm, Patient Position: Sitting, Cuff Size: Adult)   Pulse 114   Temp 98 °F (36.7 °C) (Temporal)   Ht 154.9 cm (61\")   Wt 49.9 kg (110 lb)   SpO2 98%   BMI 20.78 kg/m²     Physical Exam  Vitals and nursing note reviewed.   Constitutional:       General: She is not in acute distress.     Appearance: She is well-developed. She is not diaphoretic.   HENT:      Head: Normocephalic and atraumatic.      Right Ear: External ear normal.      Left Ear: External ear normal.      Nose: Nose normal.   Eyes:      Conjunctiva/sclera: Conjunctivae normal.      Pupils: Pupils are equal, round, and reactive to light.   Neck:      Thyroid: No thyromegaly.      Trachea: No tracheal deviation.   Cardiovascular:      Rate and Rhythm: Normal rate and regular rhythm.      Heart sounds: Normal heart sounds. No murmur heard.    No friction rub. No gallop.   Pulmonary:      Effort: Pulmonary effort is normal. No respiratory distress.      Breath sounds: Normal breath sounds.      Comments: Using oxygen 2L/NC  Abdominal:      General: Bowel sounds are " normal.      Palpations: Abdomen is soft. There is no mass.      Tenderness: There is no abdominal tenderness. There is no guarding.   Musculoskeletal:         General: Normal range of motion.      Cervical back: Normal range of motion and neck supple.      Comments: Left greater trochanteric tenderness.   Lymphadenopathy:      Cervical: No cervical adenopathy.   Skin:     General: Skin is warm and dry.      Capillary Refill: Capillary refill takes less than 2 seconds.      Findings: No rash.   Neurological:      Mental Status: She is alert and oriented to person, place, and time.      Motor: No abnormal muscle tone.      Deep Tendon Reflexes: Reflexes normal.   Psychiatric:         Behavior: Behavior normal.         Thought Content: Thought content normal.         Judgment: Judgment normal.     Arthrocentesis    Date/Time: 8/16/2022 2:55 PM  Performed by: Adolfo Calixto MD  Authorized by: Adolfo Calixto MD   Indications: pain   Body area: hip  Joint: left hip  Local anesthesia used: no    Anesthesia:  Local anesthesia used: no    Sedation:  Patient sedated: no    Preparation: Patient was prepped and draped in the usual sterile fashion.  Approach: lateral  Meds administered: 40 mg triamcinolone acetonide 40 MG/ML; 1 mL lidocaine PF 1% 1 %  Patient tolerance: patient tolerated the procedure well with no immediate complications  Comments: Left greater trochanter bursa injected and tolerated without difficulty.  Patient had near immediate improvement in pain and ambulated well after procedure.        Recent Results (from the past 2016 hour(s))   COMPREHENSIVE METABOLIC PANEL    Collection Time: 06/08/22  9:38 AM    Specimen: Blood, Venous Line   Result Value Ref Range    Glucose 99 74 - 99 mg/dL    BUN 7 (L) 8 - 23 mg/dL    Creatinine 0.50 (L) 0.60 - 1.10 mg/dL    BUN/Creatinine Ratio 14     Sodium 139 136 - 145 mmol/L    Potassium 4.3 3.7 - 4.8 mmol/L    Chloride 99 97 - 107 mmol/L    Total CO2 30 (H) 22 - 29  mmol/L    Anion Gap 10 6 - 16 mmol/L    Calcium 9.3 8.9 - 10.2 mg/dL    Total Protein 6.3 6.3 - 7.9 g/dL    Albumin 4.2 3.5 - 5.2 g/dL    AST (SGOT) 19 9 - 36 U/L    ALT (SGPT) 16 8 - 33 U/L    Alkaline Phosphatase 76 46 - 142 U/L    Total Bilirubin 0.2 0.2 - 1.1 mg/dL    eGFR Non African Am >60 >60 mL/min/1.73m*2    eGFR African Am >60 >60 mL/min/1.73m*2   CBC AND DIFFERENTIAL    Collection Time: 06/08/22  9:38 AM    Specimen: Blood, Venous Line   Result Value Ref Range    WBC 5.08 3.70 - 10.30 10*3/uL    RBC 4.31 3.90 - 5.20 10*6/uL    Hemoglobin 12.2 11.2 - 15.7 g/dL    Hematocrit 39.3 34.0 - 45.0 %    Platelets 281 155 - 369 10*3/uL    MCV 91 79 - 98 fL    MCH 28.3 26.0 - 32.0 pg    MCHC 31.0 30.7 - 35.5 g/dL    RDW 12.3 11.5 - 14.5 %    MPV 10.5 8.8 - 12.5 fL    nRBC 0.0 <=0.0 per 100 WBCs    Differential Type Automated     Neutrophil Rel % 51.0 % %    Lymphocyte Rel % 29.0 % %    Monocyte Rel % 15.0 % %    Eosinophil % 4.0 % %    Basophil Rel % 0.0 % %    Immature Grans % 1.0 % %    Neutrophils Absolute 2.58 1.60 - 6.10 10*3/uL    Lymphocytes Absolute 1.46 1.20 - 3.90 10*3/uL    Monocytes Absolute 0.78 0.30 - 0.90 10*3/uL    Eosinophils Absolute 0.21 0.00 - 0.50 10*3/uL    Basophils Absolute 0.02 0.00 - 0.10 10*3/uL    Immature Grans, Absolute 0.03 0.00 - 0.06 10*3/uL     Assessment & Plan   Diagnoses and all orders for this visit:    1. Greater trochanteric bursitis of left hip (Primary)  -     Arthrocentesis    After discussion with the patient and review of her issue it appears to be more likely greater trochanteric bursitis of the left hip.  Meloxicam is not helping we will therefore discontinue meloxicam and initiate the use of direct injection of the steroid as noted above.  This was performed and patient had near immediate improvement.  Return as needed otherwise.           · COVID-19 Precautions - Patient was compliant in wearing a mask. When I saw the patient, I used appropriate personal protective  equipment (PPE) including mask and eye shield (standard procedure).  Additionally, I used gown and gloves if indicated.  Hand hygiene was completed before and after seeing the patient.  · Dictated utilizing Dragon Dictation

## 2022-08-29 ENCOUNTER — TELEPHONE (OUTPATIENT)
Dept: FAMILY MEDICINE CLINIC | Facility: CLINIC | Age: 62
End: 2022-08-29

## 2022-08-29 NOTE — TELEPHONE ENCOUNTER
Caller: Rocío Valadez    Relationship: Self    Best call back number: [phone]    What medication are you requesting: UTI MEDICATION    What are your current symptoms: PAINFUL, BURNING URINATION, FREQUENT URINATION, URGENCY    How long have you been experiencing symptoms: STARTED LAST NIGHT    Have you had these symptoms before:    [x] Yes  [] No    Have you been treated for these symptoms before:   [x] Yes  [] No    If a prescription is needed, what is your preferred pharmacy and phone number: [facility] #[id number] [place], [place] - [address] AT [facility] - [phone] [facility] [phone] FX     Additional notes: PLEASE ADVISE

## 2022-08-29 NOTE — TELEPHONE ENCOUNTER
Caller: Rocío Valadez    Relationship to patient: Self    Best call back number: [phone]    Chief complaint: FOR PATIENT TO BE ABLE TO MOVE FORWARD WITH HER LUNG TRANSPLANT SHE WAS TOLD SHE HAS TO HAVE A PAP SMEAR.     Type of visit: PAP SMEAR WITH NO PHYSICAL    Requested date: 8/30    Additional notes: PLEASE ADVISE.

## 2022-08-31 NOTE — TELEPHONE ENCOUNTER
LMTCB    **HUB/** CAN RELAY MESSAGE in response to message from few days ago ? UTI         Either an office visit or telemedicine visit. patient  can be seen by any provider available or go to urgent care

## 2022-09-06 DIAGNOSIS — N30.00 ACUTE CYSTITIS WITHOUT HEMATURIA: Primary | ICD-10-CM

## 2022-09-12 ENCOUNTER — TELEPHONE (OUTPATIENT)
Dept: FAMILY MEDICINE CLINIC | Facility: CLINIC | Age: 62
End: 2022-09-12

## 2022-09-12 DIAGNOSIS — N30.00 ACUTE CYSTITIS WITHOUT HEMATURIA: Primary | ICD-10-CM

## 2022-09-12 RX ORDER — NITROFURANTOIN 25; 75 MG/1; MG/1
100 CAPSULE ORAL 2 TIMES DAILY
Qty: 20 CAPSULE | Refills: 0 | Status: SHIPPED | OUTPATIENT
Start: 2022-09-12 | End: 2023-01-23

## 2022-09-12 NOTE — TELEPHONE ENCOUNTER
-incoming yajaira from pt asking if results from urine test on 09/07 has returned    Advised has returned but has not been reviewed by the provider yet      Pt can be reached at [phone]

## 2022-09-13 ENCOUNTER — TELEPHONE (OUTPATIENT)
Dept: FAMILY MEDICINE CLINIC | Facility: CLINIC | Age: 62
End: 2022-09-13

## 2022-09-13 NOTE — TELEPHONE ENCOUNTER
Caller: Rocío Valadez    Relationship: Self    Best call back number:   [phone] (H)    What was the call regarding:   nitrofurantoin, macrocrystal-monohydrate, (Macrobid) 100 MG    PATIENT IS CONCERNED ABOUT THE SIDE EFFECTS AND IF IT WILL EFFECT HER LUNGS, GIVEN HER CURRENT SITUATION    PATIENT IS TRYING TO GET ON A LUNG TRANSPLANT LIST CURRENTLY      PHARMACY:  [facility] #[id number] [place], [place] - [address] [facility] [facility] [phone] [facility] [phone]   [phone]    PLEASE CALL/ADVISE

## 2022-09-13 NOTE — TELEPHONE ENCOUNTER
Please see note associated with the urine culture.  The urine culture did demonstrate bacterial growth from a common bacteria.  The bacteria is sensitive to several medications and a prescription for nitrofurantoin has been sent to the pharmacy to be taken twice a day.  This was sent on 9/12/2022.

## 2022-09-13 NOTE — TELEPHONE ENCOUNTER
Message sent to patient through [employer] that this should not have any effect upon her kidneys or her lungs and should not prevent her from having the surgery or any possible transplant.

## 2022-09-14 ENCOUNTER — OFFICE VISIT (OUTPATIENT)
Dept: FAMILY MEDICINE CLINIC | Facility: CLINIC | Age: 62
End: 2022-09-14

## 2022-09-14 VITALS
DIASTOLIC BLOOD PRESSURE: 75 MMHG | HEART RATE: 106 BPM | SYSTOLIC BLOOD PRESSURE: 159 MMHG | BODY MASS INDEX: 20.63 KG/M2 | OXYGEN SATURATION: 88 % | TEMPERATURE: 97.8 F | WEIGHT: 109.2 LBS

## 2022-09-14 DIAGNOSIS — Z12.72 ENCOUNTER FOR PAPANICOLAOU SMEAR OF VAGINA: Primary | ICD-10-CM

## 2022-09-14 PROCEDURE — 99212 OFFICE O/P EST SF 10 MIN: CPT | Performed by: NURSE PRACTITIONER

## 2022-09-20 ENCOUNTER — TELEPHONE (OUTPATIENT)
Dept: FAMILY MEDICINE CLINIC | Facility: CLINIC | Age: 62
End: 2022-09-20

## 2022-09-20 NOTE — TELEPHONE ENCOUNTER
Caller: Rocío Valadez    Relationship: Self    Best call back number: [phone]    What test was performed: PAP SMEAR    When was the test performed: 9/14/22    Where was the test performed: IN OFFICE    Additional notes: PATIENT REQUESTS A CALL BACK WITH TEST RESULTS WHEN AVAILABLE.

## 2022-09-21 LAB
AGE GDLN ACOG TESTING: NORMAL
CYTOLOGIST CVX/VAG CYTO: NORMAL
CYTOLOGY CVX/VAG DOC CYTO: NORMAL
CYTOLOGY CVX/VAG DOC THIN PREP: NORMAL
DX ICD CODE: NORMAL
HIV 1 & 2 AB SER-IMP: NORMAL
HPV I/H RISK 4 DNA CVX QL PROBE+SIG AMP: NEGATIVE
OTHER STN SPEC: NORMAL
STAT OF ADQ CVX/VAG CYTO-IMP: NORMAL

## 2022-09-22 NOTE — PROGRESS NOTES
"Chief Complaint  Gynecologic Exam    Subjective        Rocío Valadez presents to [facility]  History of Present Illness  Encounter for Pap smear: Patient has history of hysterectomy for noncancerous reason.  Patient requires Pap smear prior to lung transplant.  Objective   Vital Signs:  /75 (BP Location: Right arm, Patient Position: Sitting, Cuff Size: Adult)   Pulse 106   Temp 97.8 °F (36.6 °C) (Temporal)   Wt 49.5 kg (109 lb 3.2 oz)   SpO2 (!) 88%   BMI 20.63 kg/m²   Estimated body mass index is 20.63 kg/m² as calculated from the following:    Height as of 8/16/22: 154.9 cm (61\").    Weight as of this encounter: 49.5 kg (109 lb 3.2 oz).    BMI is within normal parameters. No other follow-up for BMI required.      Physical Exam  Vitals and nursing note reviewed. Exam conducted with a chaperone present.   Constitutional:       Appearance: Normal appearance.   Genitourinary:     Vagina: Normal.      Comments: Cervix is absent.  Neurological:      Mental Status: She is alert.        Result Review :           Assessment and Plan   Diagnoses and all orders for this visit:    1. Encounter for Papanicolaou smear of vagina (Primary)  -     IGP, Aptima HPV, CtNg Age Gdln    Other orders  -     IGP, Aptima HPV, Rfx 16 / 18,45           I spent 15 minutes caring for Rocío on this date of service. This time includes time spent by me in the following activities:performing a medically appropriate examination and/or evaluation , counseling and educating the patient/family/caregiver, ordering medications, tests, or procedures and documenting information in the medical record  Follow Up   No follow-ups on file.  Patient was given instructions and counseling regarding her condition or for health maintenance advice. Please see specific information pulled into the AVS if appropriate.       "

## 2022-09-26 ENCOUNTER — TELEPHONE (OUTPATIENT)
Dept: FAMILY MEDICINE CLINIC | Facility: CLINIC | Age: 62
End: 2022-09-26

## 2022-09-26 NOTE — TELEPHONE ENCOUNTER
Caller: Rocío Valadez    Relationship: Self    Best call back number: [phone]    What test was performed: PAP SMEAR    Additional notes: PATIENT STATED SHE WAS ON A 10 DAY ANTIBIOTIC FOR A UTI.    PATIENT STATED SHE FINISHED THIS MEDICATION AND WANTED TO KNOW IF THERE IS ANYTHING FURTHER SHE SHOULD DO.    PATIENT IS ALSO REQUESTING PAP SMEAR RESULTS.    PLEASE CALL TO ADVISE.

## 2022-09-27 NOTE — TELEPHONE ENCOUNTER
Please inform patient that Pap smear is normal. If she is still having UTI symptoms then she will need to resubmit a urine culture.

## 2022-10-21 ENCOUNTER — TRANSCRIBE ORDERS (OUTPATIENT)
Dept: CARDIAC REHAB | Facility: HOSPITAL | Age: 62
End: 2022-10-21

## 2022-10-21 DIAGNOSIS — J44.9 COPD, SEVERE: Primary | ICD-10-CM

## 2022-10-25 ENCOUNTER — OFFICE VISIT (OUTPATIENT)
Dept: FAMILY MEDICINE CLINIC | Facility: CLINIC | Age: 62
End: 2022-10-25

## 2022-10-25 VITALS
TEMPERATURE: 97.2 F | HEART RATE: 129 BPM | OXYGEN SATURATION: 97 % | BODY MASS INDEX: 20.28 KG/M2 | HEIGHT: 61 IN | SYSTOLIC BLOOD PRESSURE: 126 MMHG | WEIGHT: 107.4 LBS | DIASTOLIC BLOOD PRESSURE: 70 MMHG

## 2022-10-25 DIAGNOSIS — M79.675 TOE PAIN, LEFT: ICD-10-CM

## 2022-10-25 DIAGNOSIS — R21 RASH OF TOE: Primary | ICD-10-CM

## 2022-10-25 PROCEDURE — 99212 OFFICE O/P EST SF 10 MIN: CPT | Performed by: NURSE PRACTITIONER

## 2022-10-25 RX ORDER — CLOTRIMAZOLE AND BETAMETHASONE DIPROPIONATE 10; .64 MG/G; MG/G
1 CREAM TOPICAL 2 TIMES DAILY
Qty: 15 G | Refills: 0 | Status: SHIPPED | OUTPATIENT
Start: 2022-10-25 | End: 2023-01-23

## 2022-10-25 RX ORDER — ROSUVASTATIN CALCIUM 10 MG/1
10 TABLET, COATED ORAL
COMMUNITY
Start: 2022-10-13 | End: 2022-12-20 | Stop reason: SINTOL

## 2022-10-25 RX ORDER — AMITRIPTYLINE HYDROCHLORIDE 25 MG/1
25 TABLET, FILM COATED ORAL
Qty: 90 TABLET | Refills: 3 | Status: SHIPPED | OUTPATIENT
Start: 2022-10-25

## 2022-10-25 NOTE — PROGRESS NOTES
"Chief Complaint  Med Refill and Rash    Subjective        Rocío Valadez presents to [facility]  Rash  This is a new problem. The current episode started in the past 7 days. The affected locations include the left toes. The rash is characterized by itchiness. It is unknown if there was an exposure to a precipitant. Associated symptoms include joint pain. Pertinent negatives include no fever or nail changes. Past treatments include nothing.     Objective   Vital Signs:  /70 (BP Location: Right arm, Patient Position: Sitting, Cuff Size: Adult)   Pulse (!) 129   Temp 97.2 °F (36.2 °C) (Temporal)   Ht 154.9 cm (60.98\")   Wt 48.7 kg (107 lb 6.4 oz)   SpO2 97%   BMI 20.30 kg/m²   Estimated body mass index is 20.3 kg/m² as calculated from the following:    Height as of this encounter: 154.9 cm (60.98\").    Weight as of this encounter: 48.7 kg (107 lb 6.4 oz).    BMI is within normal parameters. No other follow-up for BMI required.      Physical Exam  Vitals and nursing note reviewed.   Constitutional:       Appearance: Normal appearance.   Cardiovascular:      Rate and Rhythm: Normal rate and regular rhythm.   Pulmonary:      Effort: Pulmonary effort is normal.      Breath sounds: Normal breath sounds.   Musculoskeletal:      Left foot: Normal.   Skin:     Comments: Erythematous rash over dorsal aspect of toes on left foot.   Neurological:      Mental Status: She is alert and oriented to person, place, and time.   Psychiatric:         Mood and Affect: Mood normal.        Result Review :           Assessment and Plan   Diagnoses and all orders for this visit:    1. Rash of toe (Primary)  -     clotrimazole-betamethasone (Lotrisone) 1-0.05 % cream; Apply 1 application topically to the appropriate area as directed 2 (Two) Times a Day.  Dispense: 15 g; Refill: 0    2. Toe pain, left  -     XR Toe 2+ View Left; Future    Other orders  -     amitriptyline (ELAVIL) 25 MG tablet; Take 1 " tablet by mouth every night at bedtime.  Dispense: 90 tablet; Refill: 3           I spent 15 minutes caring for Rocío on this date of service. This time includes time spent by me in the following activities:performing a medically appropriate examination and/or evaluation , counseling and educating the patient/family/caregiver, ordering medications, tests, or procedures and documenting information in the medical record  Follow Up   Return if symptoms worsen or fail to improve.  Patient was given instructions and counseling regarding her condition or for health maintenance advice. Please see specific information pulled into the AVS if appropriate.

## 2022-11-04 ENCOUNTER — OFFICE VISIT (OUTPATIENT)
Dept: CARDIAC REHAB | Facility: HOSPITAL | Age: 62
End: 2022-11-04

## 2022-11-04 VITALS
DIASTOLIC BLOOD PRESSURE: 80 MMHG | HEART RATE: 100 BPM | HEIGHT: 61 IN | SYSTOLIC BLOOD PRESSURE: 142 MMHG | RESPIRATION RATE: 20 BRPM | BODY MASS INDEX: 20.52 KG/M2 | OXYGEN SATURATION: 100 % | WEIGHT: 108.7 LBS

## 2022-11-04 DIAGNOSIS — J44.9 COPD, VERY SEVERE: Primary | ICD-10-CM

## 2022-11-04 PROCEDURE — 94625 PHY/QHP OP PULM RHB W/O MNTR: CPT

## 2022-11-04 NOTE — PROGRESS NOTES
Pulmonary Rehab Initial Assessment      Name: Rocío Valadez  :1960 Allergies:Bee venom, Shellfish-derived products, Doxycycline, Erythromycin, Lisinopril, Contrast dye, Amoxicillin-pot clavulanate, and Iodine   MRN: [id number] 62 y.o. Physician: Fanny Rios APRN   Primary Diagnosis:    Diagnosis Plan   1. COPD, very severe (HCC)         Event Date: 2022 Specialist: Blake Talavera MD   Secondary Diagnosis: Asthma  Note Author: Qi Morales RN     Cardiovascular History: HTN, Hyperlipidemia, left and right heart cath     EXERCISE AT HOME  yes  20min  7 days per week          Ambulatory Status:Independent  Ambulatory Fall Risk Assessed on Initial Visit: no 6 Minute Walk Pre- Pulmonary Rehab:  Distance:841ft      RPE:3        RPD: 3              MPH: 1.6  Max. HR: 116        SPO2:98    MET: 2.2  Resting BP: 142/80     Peak BP: 150/80  Recovery BP: 144/84  Comments: Pt tolerated exercise. Sp02 was  with exercise. One 1 minute rest stop taken due to SOA.      NUTRITION  Lipids:yes If yes, labs as follows;  Total: No components found for: CHOLESTEROL  HDL:   HDL Cholesterol   Date Value Ref Range Status   2022 72 >39 mg/dL Final    Lipids continued:  LDL:  LDL Chol Calc (NIH)   Date Value Ref Range Status   2022 179 (H) 0 - 99 mg/dL Final     Triglyceride: No components found for: TRIGLYCERIDE   Weight Management:                 Weight: 108.7lbs  Height: 61inches                                   BMI: Body mass index is 20.54 kg/m².  Waist Circumference: n/a inches   Alcohol Use: none Diabetes:No    Last HGBA1C with date if applicable:No components found for: A1C         SOCIAL HISTORY  Social History     Socioeconomic History   • Marital status:    Tobacco Use   • Smoking status: Former     Packs/day: 1.00     Years: 30.00     Pack years: 30.00     Types: Cigarettes   • Smokeless tobacco: Never   • Tobacco comments:     QUIT 7 YEARS AGO   Substance and Sexual Activity    • Alcohol use: Not Currently     Alcohol/week: 2.0 standard drinks     Types: 2 Glasses of wine per week     Comment: OCCASIONALLY    • Drug use: Never   • Sexual activity: Yes     Partners: Male     Birth control/protection: Post-menopausal, Tubal ligation    Learning Barriers:Ready to Learn  Family Support:yes  Living Arrangement: lives with their spouse Tobacco Adjunct:no  Do you live with a smoker: no     PSYCHOSOCIAL  Clinical Depression: no    Stress: no     Assess presence or absence of depression using a valid screening tool: yes      Assessment: Pt alert and oriented x3           Are you being hurt, hit, or freightened by anyone at home or in your life? no    Are you being neglected by a caregiver? No Shoulder flexibility/Range of motion: Excellent     Recommended arm activity: Any    Chair sit and reach within: 0 inches   Leg flexibility: Excellent    Leg Strength/Balance/Five times sit to stand: 14 seconds.   Pt did not use upper body strength    Recommended stretching: Chair   Balance: Average Assessment: Wheezes noted to bilat lungs. Regular heart rate and rhythm noted.    Family attends [place]: no      COMORBIDITIES  Sleep Apnea: no If yes, Choose: N/A    Cancer: no    Stroke: no   Pneumonia: yes If yes, how many times 15    Osteoporosis: yes    GI Problems: yes GERD, diverticulosis, hiatal hernia Frequent colds/allergies: yes    Other illnesses, surgeries, or comments Vit D deficiency, B12 deficiency, hysterectomy, early satiety, arthritis, torn rotator cuff and surgery to repair tear    PAIN:  Are you having pain? yes  If yes where is pain? Pt states when she tries to breathe, her insides hurt.  If yes, pain scale: 3      PULMONARY:  Do you use a nebulizer?: yes   If yes, prn with illness    Do you use oxygen at home?: yes  If yes, amount 4.5-5L    With rest: yes 4.5L  With activity: yes 5L Do you have a daily cough?: yes  If yes, choose: dry    Do you every notice yourself wheezing?: yes  If yes,  when: with activity Other pulmonary/breathing problems?: no   OTHER:  Do you have physical limitations?: no  If yes, n/a    Do you need assistance with ADLs?: yes  If yes, heavy housework, dressing at times, cooking, showering at time Do you climb stairs at home?:yes  If yes, how many times 5    Have you ever attended a pulmonary rehab?: yes  If yes, Phase III [facility] Dyspnea Scale: 0 - 4:  3 = stops for breath after walking about 100 yards or after a few minutes on the level     Patient Goals: Pt has qualified for the lung transplant list at .  Pt would like to get her physical strength ready for surgery. She would like to improve her cardiac health. Pt would like to work on her endurance to get ready for the surgery.       DISCHARGE PLANNING:  Do you have any home exercise equipment?: yes weights and bands    What are you plans for continuing exercise after completion of pulmonary rehab? Pt plans on joining Phase III     EDUCATION:  Pursed - lip breathing, Diaphragmatic breathing, Relaxation techniques and Program information folder       PRE-PROGRAM ASSESSMENT:  PFT Date: 10-    FEV1/FVC: 24 FEV1: 19    FVC: 62 DLCO: n/a     Time of arrival: 10:00    Time of departure: 11:30           11/4/2022  10:16 EDT  Qi Morales RN

## 2022-11-08 ENCOUNTER — TREATMENT (OUTPATIENT)
Dept: CARDIAC REHAB | Facility: HOSPITAL | Age: 62
End: 2022-11-08

## 2022-11-08 DIAGNOSIS — J44.9 COPD, VERY SEVERE: Primary | ICD-10-CM

## 2022-11-08 PROCEDURE — 94626 PHY/QHP OP PULM RHB W/MNTR: CPT

## 2022-11-10 ENCOUNTER — TREATMENT (OUTPATIENT)
Dept: CARDIAC REHAB | Facility: HOSPITAL | Age: 62
End: 2022-11-10

## 2022-11-10 DIAGNOSIS — J44.9 COPD, VERY SEVERE: Primary | ICD-10-CM

## 2022-11-10 PROCEDURE — 94626 PHY/QHP OP PULM RHB W/MNTR: CPT

## 2022-11-15 ENCOUNTER — TREATMENT (OUTPATIENT)
Dept: CARDIAC REHAB | Facility: HOSPITAL | Age: 62
End: 2022-11-15

## 2022-11-15 DIAGNOSIS — J44.9 COPD, VERY SEVERE: Primary | ICD-10-CM

## 2022-11-15 PROCEDURE — 94626 PHY/QHP OP PULM RHB W/MNTR: CPT

## 2022-11-17 ENCOUNTER — TREATMENT (OUTPATIENT)
Dept: CARDIAC REHAB | Facility: HOSPITAL | Age: 62
End: 2022-11-17

## 2022-11-17 DIAGNOSIS — J44.9 COPD, VERY SEVERE: Primary | ICD-10-CM

## 2022-11-17 PROCEDURE — 94626 PHY/QHP OP PULM RHB W/MNTR: CPT

## 2022-11-22 ENCOUNTER — APPOINTMENT (OUTPATIENT)
Dept: CARDIAC REHAB | Facility: HOSPITAL | Age: 62
End: 2022-11-22

## 2022-11-29 ENCOUNTER — APPOINTMENT (OUTPATIENT)
Dept: CARDIAC REHAB | Facility: HOSPITAL | Age: 62
End: 2022-11-29

## 2022-12-06 ENCOUNTER — TREATMENT (OUTPATIENT)
Dept: CARDIAC REHAB | Facility: HOSPITAL | Age: 62
End: 2022-12-06
Payer: COMMERCIAL

## 2022-12-06 DIAGNOSIS — J44.9 COPD, VERY SEVERE: Primary | ICD-10-CM

## 2022-12-06 PROCEDURE — 94625 PHY/QHP OP PULM RHB W/O MNTR: CPT

## 2022-12-08 ENCOUNTER — TREATMENT (OUTPATIENT)
Dept: CARDIAC REHAB | Facility: HOSPITAL | Age: 62
End: 2022-12-08
Payer: COMMERCIAL

## 2022-12-08 DIAGNOSIS — J44.9 COPD, VERY SEVERE: Primary | ICD-10-CM

## 2022-12-08 PROCEDURE — 94625 PHY/QHP OP PULM RHB W/O MNTR: CPT

## 2022-12-13 ENCOUNTER — TREATMENT (OUTPATIENT)
Dept: CARDIAC REHAB | Facility: HOSPITAL | Age: 62
End: 2022-12-13
Payer: COMMERCIAL

## 2022-12-13 DIAGNOSIS — J44.9 COPD, VERY SEVERE: Primary | ICD-10-CM

## 2022-12-13 PROCEDURE — 94625 PHY/QHP OP PULM RHB W/O MNTR: CPT

## 2022-12-15 ENCOUNTER — TREATMENT (OUTPATIENT)
Dept: CARDIAC REHAB | Facility: HOSPITAL | Age: 62
End: 2022-12-15
Payer: COMMERCIAL

## 2022-12-15 DIAGNOSIS — J44.9 COPD, VERY SEVERE: Primary | ICD-10-CM

## 2022-12-15 PROCEDURE — 94625 PHY/QHP OP PULM RHB W/O MNTR: CPT

## 2022-12-20 ENCOUNTER — OFFICE VISIT (OUTPATIENT)
Dept: FAMILY MEDICINE CLINIC | Facility: CLINIC | Age: 62
End: 2022-12-20

## 2022-12-20 ENCOUNTER — TREATMENT (OUTPATIENT)
Dept: CARDIAC REHAB | Facility: HOSPITAL | Age: 62
End: 2022-12-20
Payer: COMMERCIAL

## 2022-12-20 VITALS
DIASTOLIC BLOOD PRESSURE: 68 MMHG | BODY MASS INDEX: 20.86 KG/M2 | HEART RATE: 100 BPM | OXYGEN SATURATION: 89 % | TEMPERATURE: 98.2 F | SYSTOLIC BLOOD PRESSURE: 149 MMHG | WEIGHT: 110.4 LBS

## 2022-12-20 DIAGNOSIS — J44.9 COPD, VERY SEVERE: Primary | ICD-10-CM

## 2022-12-20 DIAGNOSIS — K64.9 HEMORRHOIDS, UNSPECIFIED HEMORRHOID TYPE: Primary | ICD-10-CM

## 2022-12-20 PROCEDURE — 94625 PHY/QHP OP PULM RHB W/O MNTR: CPT

## 2022-12-20 PROCEDURE — 99212 OFFICE O/P EST SF 10 MIN: CPT | Performed by: NURSE PRACTITIONER

## 2022-12-20 RX ORDER — ATORVASTATIN CALCIUM 20 MG/1
20 TABLET, FILM COATED ORAL DAILY
COMMUNITY
Start: 2022-12-14

## 2022-12-20 RX ORDER — HYDROCORTISONE ACETATE PRAMOXINE HCL 1; 1 G/100G; G/100G
CREAM TOPICAL 2 TIMES DAILY
Qty: 30 G | Refills: 0 | Status: SHIPPED | OUTPATIENT
Start: 2022-12-20 | End: 2023-01-23

## 2022-12-20 NOTE — PROGRESS NOTES
"Chief Complaint  Hemorrhoids    Subjective        Rocío Valadez presents to [facility]  Hemorrhoids  The problem has been gradually worsening. Pertinent negatives include no change in bowel habit. Exacerbated by: bowel movements. Treatments tried: OTC Preparation H.  The treatment provided no relief.     Objective   Vital Signs:  /68 (BP Location: Left arm, Patient Position: Sitting, Cuff Size: Adult)   Pulse 100   Temp 98.2 °F (36.8 °C) (Temporal)   Wt 50.1 kg (110 lb 6.4 oz)   SpO2 (!) 89%   BMI 20.86 kg/m²   Estimated body mass index is 20.86 kg/m² as calculated from the following:    Height as of 11/4/22: 154.9 cm (61\").    Weight as of this encounter: 50.1 kg (110 lb 6.4 oz).    BMI is within normal parameters. No other follow-up for BMI required.      Physical Exam  Vitals and nursing note reviewed.   Constitutional:       Appearance: Normal appearance.   Cardiovascular:      Rate and Rhythm: Normal rate and regular rhythm.      Heart sounds: Normal heart sounds.   Pulmonary:      Effort: Pulmonary effort is normal.   Genitourinary:     Comments: External hemorrhoids noted   Neurological:      Mental Status: She is alert and oriented to person, place, and time.   Psychiatric:         Mood and Affect: Mood normal.        Result Review :           Assessment and Plan   Diagnoses and all orders for this visit:    1. Hemorrhoids, unspecified hemorrhoid type (Primary)  -     Ambulatory Referral to Colorectal Surgery  -     Hydrocortisone Ace-Pramoxine 1-1 % rectal cream; Insert  into the rectum 2 (Two) Times a Day.  Dispense: 30 g; Refill: 0           I spent 15 minutes caring for Rocío on this date of service. This time includes time spent by me in the following activities:performing a medically appropriate examination and/or evaluation , counseling and educating the patient/family/caregiver, ordering medications, tests, or procedures and documenting information in the " medical record  Follow Up   Return if symptoms worsen or fail to improve.  Patient was given instructions and counseling regarding her condition or for health maintenance advice. Please see specific information pulled into the AVS if appropriate.

## 2022-12-21 ENCOUNTER — TELEPHONE (OUTPATIENT)
Dept: FAMILY MEDICINE CLINIC | Facility: CLINIC | Age: 62
End: 2022-12-21

## 2022-12-21 NOTE — TELEPHONE ENCOUNTER
Caller: Rory Rocío L    Relationship: Self    Best call back number:     [phone] (Mobile)     What medication are you requesting:   Hydrocortisone Ace-Pramoxine 1-1 % rectal cream IN A SUPPOSITORY FORM     What are your current symptoms:  HEMORRHOIDS    Have you had these symptoms before:    [x] Yes  [] No    Have you been treated for these symptoms before:   [x] Yes  [] No    If a prescription is needed, what is your preferred pharmacy and phone number: [id number] [place], [place] - [address] AT [facility] & [facility] [place] [phone] [facility] [phone] FX     Additional notes:    HASN'T HAD A BOWEL MOVEMENT IN A WHILE, AND CANNOT BE SEEN UNTIL January 23RD WITH DR LAL     CAN YOU RECOMMEND SOMETHING TO HELP HER?  CATRACHITO MCMANUS HAS NOT HELPED   HAS NOT USED THE [facility] JUST YET

## 2022-12-22 ENCOUNTER — APPOINTMENT (OUTPATIENT)
Dept: CARDIAC REHAB | Facility: HOSPITAL | Age: 62
End: 2022-12-22
Payer: COMMERCIAL

## 2022-12-22 NOTE — TELEPHONE ENCOUNTER
Please ask patient if she has tried OTC Miralax daily? If so, then I can see if insurance will cover something like Linzess.

## 2022-12-27 ENCOUNTER — TREATMENT (OUTPATIENT)
Dept: CARDIAC REHAB | Facility: HOSPITAL | Age: 62
End: 2022-12-27
Payer: COMMERCIAL

## 2022-12-27 DIAGNOSIS — J44.9 COPD, VERY SEVERE: Primary | ICD-10-CM

## 2022-12-27 PROCEDURE — 94625 PHY/QHP OP PULM RHB W/O MNTR: CPT

## 2022-12-29 ENCOUNTER — TREATMENT (OUTPATIENT)
Dept: CARDIAC REHAB | Facility: HOSPITAL | Age: 62
End: 2022-12-29
Payer: COMMERCIAL

## 2022-12-29 DIAGNOSIS — J44.9 COPD, VERY SEVERE: Primary | ICD-10-CM

## 2022-12-29 PROCEDURE — 94625 PHY/QHP OP PULM RHB W/O MNTR: CPT

## 2023-01-03 ENCOUNTER — TREATMENT (OUTPATIENT)
Dept: CARDIAC REHAB | Facility: HOSPITAL | Age: 63
End: 2023-01-03
Payer: COMMERCIAL

## 2023-01-03 DIAGNOSIS — J44.9 COPD, VERY SEVERE: Primary | ICD-10-CM

## 2023-01-03 PROCEDURE — 94625 PHY/QHP OP PULM RHB W/O MNTR: CPT

## 2023-01-05 ENCOUNTER — TREATMENT (OUTPATIENT)
Dept: CARDIAC REHAB | Facility: HOSPITAL | Age: 63
End: 2023-01-05
Payer: COMMERCIAL

## 2023-01-05 DIAGNOSIS — J44.9 COPD, VERY SEVERE: Primary | ICD-10-CM

## 2023-01-05 PROCEDURE — 94625 PHY/QHP OP PULM RHB W/O MNTR: CPT

## 2023-01-10 ENCOUNTER — TREATMENT (OUTPATIENT)
Dept: CARDIAC REHAB | Facility: HOSPITAL | Age: 63
End: 2023-01-10
Payer: COMMERCIAL

## 2023-01-10 DIAGNOSIS — J44.9 COPD, VERY SEVERE: Primary | ICD-10-CM

## 2023-01-10 PROCEDURE — 94625 PHY/QHP OP PULM RHB W/O MNTR: CPT

## 2023-01-12 ENCOUNTER — TREATMENT (OUTPATIENT)
Dept: CARDIAC REHAB | Facility: HOSPITAL | Age: 63
End: 2023-01-12
Payer: COMMERCIAL

## 2023-01-12 DIAGNOSIS — J44.9 COPD, VERY SEVERE: Primary | ICD-10-CM

## 2023-01-12 PROCEDURE — 94625 PHY/QHP OP PULM RHB W/O MNTR: CPT

## 2023-01-17 ENCOUNTER — TREATMENT (OUTPATIENT)
Dept: CARDIAC REHAB | Facility: HOSPITAL | Age: 63
End: 2023-01-17
Payer: COMMERCIAL

## 2023-01-17 DIAGNOSIS — J44.9 COPD, VERY SEVERE: Primary | ICD-10-CM

## 2023-01-17 PROCEDURE — 94625 PHY/QHP OP PULM RHB W/O MNTR: CPT

## 2023-01-19 ENCOUNTER — TREATMENT (OUTPATIENT)
Dept: CARDIAC REHAB | Facility: HOSPITAL | Age: 63
End: 2023-01-19
Payer: COMMERCIAL

## 2023-01-19 DIAGNOSIS — J44.9 COPD, VERY SEVERE: Primary | ICD-10-CM

## 2023-01-19 PROCEDURE — 94625 PHY/QHP OP PULM RHB W/O MNTR: CPT

## 2023-01-23 ENCOUNTER — OFFICE VISIT (OUTPATIENT)
Dept: SURGERY | Facility: CLINIC | Age: 63
End: 2023-01-23
Payer: COMMERCIAL

## 2023-01-23 VITALS
DIASTOLIC BLOOD PRESSURE: 80 MMHG | SYSTOLIC BLOOD PRESSURE: 146 MMHG | HEIGHT: 60 IN | BODY MASS INDEX: 21.07 KG/M2 | WEIGHT: 107.3 LBS

## 2023-01-23 DIAGNOSIS — K59.04 CHRONIC IDIOPATHIC CONSTIPATION: ICD-10-CM

## 2023-01-23 DIAGNOSIS — K64.8 INTERNAL HEMORRHOIDS WITH COMPLICATION: Primary | ICD-10-CM

## 2023-01-23 PROCEDURE — 99203 OFFICE O/P NEW LOW 30 MIN: CPT | Performed by: PHYSICIAN ASSISTANT

## 2023-01-23 PROCEDURE — 46600 DIAGNOSTIC ANOSCOPY SPX: CPT | Performed by: PHYSICIAN ASSISTANT

## 2023-01-23 RX ORDER — HYDROCORTISONE ACETATE 25 MG/1
25 SUPPOSITORY RECTAL EVERY 12 HOURS
Qty: 14 SUPPOSITORY | Refills: 0 | Status: SHIPPED | OUTPATIENT
Start: 2023-01-23 | End: 2023-01-24

## 2023-01-23 RX ORDER — CEFUROXIME AXETIL 250 MG/1
250 TABLET ORAL 2 TIMES DAILY
COMMUNITY
Start: 2022-12-23 | End: 2023-01-23

## 2023-01-23 RX ORDER — PREDNISONE 10 MG/1
TABLET ORAL
COMMUNITY
Start: 2022-12-23 | End: 2023-01-23

## 2023-01-23 NOTE — PROGRESS NOTES
"Rocío Valadez is a 62 y.o. female who is seen as a consult at the request of CLEMENT Dickerson for Rectal Bleeding.      HPI:  Pt presents today for evaluation of intermittent RB, mucous drainage, and prolapsing tissue which she contributes to hemorrhoids.   Experiences prolapsing tissue with defecation. Tissue typically reduces spontaneously, but occasionally requires manual reduction.    OTC remedies not helpful.   Pt was seen by her PCP 1 month ago and told she had enlarged external hemorrhoids.   She was provided with Rx for HC Ace-Pramoxine 1-1% cream, which she is applying only externally.   Applied x10 days with some improvement. States she is almost out of this cream.   Pt denies any rectal pain.   Not taking any anticoagulation.   States she is currently on the lung transplant list and would like to avoid surgery for Tx of the hemorrhoids.     Pt with episodes of constipation for the past 3-4 years with intermittent episodes of LLQ pain.   She was followed by GI previously and tried several treatment options including Linzess, Motegrity, and Trulance.   States these all caused episodes of \"explosive\" diarrhea, and therefore discontinued them.   Pt has an appointment to establish care with a new GI provider in 04/2023.   She is currently taking 2 stool softeners QHS with varying results.   Tries to stay hydrated throughout the day.    Takes Gas-X PRN for abdominal bloating.     Last colonoscopy on 06/03/2021 performed at [facility]. Pt was found to have two tubular adenomas, diverticulosis of the sigmoid colon, and hemorrhoids. Recommended to repeat in 3 years.   Pt denies any known FHx of colon polyps, colon cancer, or IBD.     Past Medical History:   Diagnosis Date   • Allergic rhinitis     FOLLOWED BY DR. DOMINICK QUIROZ   • Anemia 08/2021   • Arthritis    • Asthma    • Atypical chest pain    • B12 deficiency 02/16/2022   • Solares's esophagus    • Benign essential hypertension    • Bloating    • Carrier of " alpha-1-antitrypsin deficiency    • Cervical muscle strain 01/08/2020   • Chronic idiopathic constipation    • Chronic respiratory failure with hypercapnia (HCC)    • Chronic vaginitis    • Colon polyps     FOLLOWED BY DR. MARYJO YBARRA   • COPD (chronic obstructive pulmonary disease) (HCC)     SEVERE, CONSULTING FOR LUNG TRANSPLANT   • Cough    • COVID-19    • Cystitis 09/2022   • Dyspareunia in female    • Dysphagia    • Early satiety    • Environmental allergies    • Gallbladder sludge    • GERD (gastroesophageal reflux disease)    • Greater trochanteric bursitis of left hip 08/2022   • Headache    • Hemorrhoids    • Hiatal hernia    • Hyperlipidemia    • Irritable bowel syndrome    • Low back pain    • Mucous retention cyst of salivary gland 04/2022    OF TONSIL, OFFERED REMOVAL, PT DECLINE, WATCHFUL FOLLOW UP, SEEN BY DR. HUSSAIN SINCLAIR   • Oral thrush 06/2020   • Osteoporosis    • Oxygen dependent 08/12/2021   • PNA (pneumonia) 06/2018   • PNA (pneumonia) 08/29/2022    ADMITTED TO [facility]   • Positive colorectal cancer screening using Cologuard test 04/2021   • Postmenopausal HRT (hormone replacement therapy)    • Postoperative urinary retention    • Pulmonary nodule, right    • Rotator cuff tear, right 06/2020    S/P REPAIR   • Throat clearing    • Vaginal atrophy    • Vitamin D deficiency 02/16/2022       Past Surgical History:   Procedure Laterality Date   • ABDOMINAL HYSTERECTOMY N/A 1994    PHIL WITH BSO, fibroid tumors and menorhagia   • BREAST SURGERY Bilateral     enlargement procedure. [facility]ine   • BRONCHOSCOPY N/A 12/31/2014    WITH ALVEOLAR LAVAGE   • CARDIAC CATHETERIZATION Right 09/01/2022    NORMAL CORONARY ARTERIES, NO EVIDENCE OF OBSTRUCTIVE CAD, DR. ALEXIA PACE AT [facility]   • COLONOSCOPY N/A 2008    WNL   • COLONOSCOPY W/ POLYPECTOMY N/A 06/03/2021    TORTUOUS COLON, 4 MM POLYP IN HEPATIC FLEXURE, 20 MM POLYP IN RECTOSIGMOID, SIGMOID DIVERTICULA WITH NARROWING, HEMORRHOIDS, RESCOPE IN 3  YRS, DR. MARYJO YBARRA AT [facility]   • COLONOSCOPY W/ POLYPECTOMY N/A 03/10/2016    2 BENIGN POLYPS IN RECTOSIGMOID, 1 BENIGN POLYP IN TRANSVERSE, SEVERE DIVERTICULOSIS IN SIGMOID, MODERATE DIVERTICULOSIS THROUGHOUT, RESCOPE IN 5 YRS, DR. MARYJO YBARRA AT [facility]   • ENDOSCOPY N/A 03/10/2016    ESOPHAGITIS, Z LINE IRREGULAR, SMALL HIATAL HERNIA, ESOPHAGUS SPASMS, DR. MARYJO YBARRA AT [facility]   • ENDOSCOPY N/A 02/09/2021    ESOPHAGEAL STENOSIS DILATED, DR. TASHA YBARRA AT [facility]   • SHOULDER ARTHROSCOPY Right 06/17/2020    Procedure: SHOULDER ARTHROSCOPY  ROTATOR CUFF REPAIR ACROMIOPLASTY  PRP AUGMENTATION ;  Surgeon: Isidoro Albrecht MD;  [facility];  Service: Orthopedics;  Laterality: Right;   • TUBAL ABDOMINAL LIGATION Bilateral        Social History:   reports that she quit smoking about 10 years ago. Her smoking use included cigarettes. She started smoking about 34 years ago. She has a 45.00 pack-year smoking history. She has been exposed to tobacco smoke. She has never used smokeless tobacco. She reports that she does not currently use alcohol after a past usage of about 2.0 standard drinks per week. She reports that she does not use drugs.      Marriage status:     Family History   Problem Relation Age of Onset   • COPD Mother    • Diabetes Mother    • Hypertension Mother    • Heart disease Mother    • Arthritis Mother    • Heart attack Mother    • Aneurysm Father    • Hypertension Father    • Cancer Brother    • Liver cancer Brother    • Cirrhosis Brother    • Thyroid disease Daughter    • Diabetes Maternal Grandmother    • Cancer Maternal Grandmother    • Lung cancer Maternal Grandmother    • Malig Hyperthermia Neg Hx          Current Outpatient Medications:   •  amitriptyline (ELAVIL) 25 MG tablet, Take 1 tablet by mouth every night at bedtime., Disp: 90 tablet, Rfl: 3  •  atorvastatin (LIPITOR) 20 MG tablet, Take 20 mg by mouth Daily., Disp: , Rfl:   •   budesonide-formoterol (SYMBICORT) 160-4.5 MCG/ACT inhaler, Inhale 2 puffs 2 (Two) Times a Day., Disp: , Rfl:   •  cyanocobalamin 1000 MCG/ML injection, Inject 1 mL into the appropriate muscle as directed by prescriber Every 30 (Thirty) Days., Disp: 3 mL, Rfl: 3  •  DALIRESP 500 MCG tablet tablet, Take 500 mcg by mouth Daily., Disp: , Rfl:   •  DEXILANT 60 MG capsule, TAKE 1 CAPSULE DAILY EVERY MORNING BEFORE BREAKFAST, Disp: 90 capsule, Rfl: 4  •  PROAIR  (90 Base) MCG/ACT inhaler, Inhale 1 puff Every 4 (Four) Hours As Needed., Disp: , Rfl:   •  tiotropium (SPIRIVA) 18 MCG per inhalation capsule, Place 1 capsule into inhaler and inhale Daily., Disp: , Rfl:   •  triamterene-hydrochlorothiazide (MAXZIDE-25) 37.5-25 MG per tablet, Take 1 tablet by mouth Daily., Disp: 90 tablet, Rfl: 3  •  vitamin D (ERGOCALCIFEROL) 1.25 MG (89720 UT) capsule capsule, Take 1 capsule by mouth 1 (One) Time Per Week., Disp: 12 capsule, Rfl: 3  •  hydrocortisone (ANUSOL-HC) 25 MG suppository, Insert 1 suppository into the rectum Every 12 (Twelve) Hours for 7 days., Disp: 14 suppository, Rfl: 0    Allergy  Bee venom, Shellfish-derived products, Doxycycline, Erythromycin, Lisinopril, Amoxicillin-pot clavulanate, Contrast dye (echo or unknown ct/mr), and Iodine    Review of Systems   Constitutional: Positive for malaise/fatigue. Negative for decreased appetite and weight gain.   HENT: Negative for congestion, hearing loss and hoarse voice.    Eyes: Negative for blurred vision, discharge and visual disturbance.   Cardiovascular: Negative for chest pain, cyanosis and leg swelling.   Respiratory: Positive for cough, shortness of breath and wheezing. Negative for sleep disturbances due to breathing and snoring.    Endocrine: Negative for cold intolerance and heat intolerance.   Hematologic/Lymphatic: Does not bruise/bleed easily.   Skin: Negative for itching, poor wound healing and skin cancer.   Musculoskeletal: Negative for arthritis,  back pain, joint pain and joint swelling.   Gastrointestinal: Positive for constipation. Negative for abdominal pain, change in bowel habit and bowel incontinence.   Genitourinary: Negative for bladder incontinence, dysuria and hematuria.   Neurological: Negative for brief paralysis, excessive daytime sleepiness, dizziness, focal weakness, headaches, light-headedness and weakness.   Psychiatric/Behavioral: Negative for altered mental status and hallucinations. The patient does not have insomnia.    Allergic/Immunologic: Negative for HIV exposure and persistent infections.   All other systems reviewed and are negative.      Vitals:    01/23/23 1432   BP: 146/80     Body mass index is 20.96 kg/m².    Physical Exam  Exam conducted with a chaperone present.   Constitutional:       General: She is not in acute distress.     Appearance: She is well-developed.   HENT:      Head: Normocephalic and atraumatic.      Nose: Nose normal.   Eyes:      Conjunctiva/sclera: Conjunctivae normal.      Pupils: Pupils are equal, round, and reactive to light.   Neck:      Trachea: No tracheal deviation.   Pulmonary:      Effort: Pulmonary effort is normal. No respiratory distress.      Breath sounds: Normal breath sounds.   Abdominal:      General: Bowel sounds are normal. There is no distension.      Palpations: Abdomen is soft.   Genitourinary:     Comments: Perianal exam: No enlargement of external hemorrhoids. Mild prolapsing of internal hemorrhoids with valsalva.   ARYA- Decreased tone, no masses  Anoscopy performed:  Grade II x 2 internal hem   Musculoskeletal:         General: No deformity. Normal range of motion.      Cervical back: Normal range of motion.   Skin:     General: Skin is warm and dry.   Neurological:      Mental Status: She is alert and oriented to person, place, and time.      Cranial Nerves: No cranial nerve deficit.      Coordination: Coordination normal.      Gait: Gait normal.   Psychiatric:         Behavior:  Behavior normal.         Judgment: Judgment normal.         Review of Medical Record:  I reviewed PMHx, surgical hx, FHx, and current medications     Colonoscopy 06/03/2021:  - Tortuous Colon  - 4mm Tubular Adenoma, Hepatic Flexure  - 20 mm Tubular Adenoma, Recto-sigmoid Colon  - Diverticulosis, Sigmoid  - Hemorrhoids  - Rescope: 3 Years  [facility]    Assessment:  1. Internal hemorrhoids with complication    2. Chronic idiopathic constipation    - New    Plan:  - Discussed physical exam findings and common causes of hemorrhoid irritation and enlargement with Pt.   - Rx for Anusol-HC 25 mg suppositories. Insert BID x7 Days. Cautioned against chronic use.   - Start Fiber. Recommended 30-40g Daily. Printed instructions provided.   - Continue stool softeners as needed  - Follow up in 8 weeks for reevaluation.    PAST SURGICAL HISTORY:  History of lung biopsy     S/P carotid endarterectomy

## 2023-01-24 ENCOUNTER — TREATMENT (OUTPATIENT)
Dept: CARDIAC REHAB | Facility: HOSPITAL | Age: 63
End: 2023-01-24
Payer: COMMERCIAL

## 2023-01-24 ENCOUNTER — TELEPHONE (OUTPATIENT)
Dept: SURGERY | Facility: CLINIC | Age: 63
End: 2023-01-24

## 2023-01-24 DIAGNOSIS — J44.9 COPD, VERY SEVERE: Primary | ICD-10-CM

## 2023-01-24 PROCEDURE — 94625 PHY/QHP OP PULM RHB W/O MNTR: CPT

## 2023-01-24 RX ORDER — HYDROCORTISONE 25 MG/G
CREAM TOPICAL
Qty: 30 G | Refills: 1 | Status: SHIPPED | OUTPATIENT
Start: 2023-01-24

## 2023-01-24 NOTE — TELEPHONE ENCOUNTER
Caller: Rocío Valadez    Best call back number: [phone]    When did you start taking these medications: WAS JUST PRESCRIBED YESTERDAY - 1/23/23    Which medication are you concerned about: ANUSOL-    Who prescribed you this medication: CHAVEZ QUICK    What are your concerns: PT STATES INSURANCE DENIED IT DUE TO IT NOT BEING FDA APPROVED. PLEASE GIVE HER A CALL.

## 2023-01-24 NOTE — TELEPHONE ENCOUNTER
Phoned patient and is aware of medication switched and called into local pharmacy. Patient voiced understanding.

## 2023-01-26 ENCOUNTER — APPOINTMENT (OUTPATIENT)
Dept: CARDIAC REHAB | Facility: HOSPITAL | Age: 63
End: 2023-01-26
Payer: COMMERCIAL

## 2023-01-30 DIAGNOSIS — I10 BENIGN ESSENTIAL HYPERTENSION: ICD-10-CM

## 2023-01-31 ENCOUNTER — APPOINTMENT (OUTPATIENT)
Dept: CARDIAC REHAB | Facility: HOSPITAL | Age: 63
End: 2023-01-31
Payer: COMMERCIAL

## 2023-01-31 RX ORDER — ERGOCALCIFEROL 1.25 MG/1
50000 CAPSULE ORAL WEEKLY
Qty: 12 CAPSULE | Refills: 0 | Status: SHIPPED | OUTPATIENT
Start: 2023-01-31

## 2023-01-31 RX ORDER — TRIAMTERENE AND HYDROCHLOROTHIAZIDE 37.5; 25 MG/1; MG/1
1 TABLET ORAL DAILY
Qty: 90 TABLET | Refills: 0 | Status: SHIPPED | OUTPATIENT
Start: 2023-01-31

## 2023-02-02 ENCOUNTER — APPOINTMENT (OUTPATIENT)
Dept: CARDIAC REHAB | Facility: HOSPITAL | Age: 63
End: 2023-02-02
Payer: COMMERCIAL

## 2023-02-07 ENCOUNTER — TREATMENT (OUTPATIENT)
Dept: CARDIAC REHAB | Facility: HOSPITAL | Age: 63
End: 2023-02-07
Payer: COMMERCIAL

## 2023-02-07 DIAGNOSIS — J44.9 COPD, VERY SEVERE: Primary | ICD-10-CM

## 2023-02-07 PROCEDURE — 94625 PHY/QHP OP PULM RHB W/O MNTR: CPT

## 2023-02-09 ENCOUNTER — TREATMENT (OUTPATIENT)
Dept: CARDIAC REHAB | Facility: HOSPITAL | Age: 63
End: 2023-02-09
Payer: COMMERCIAL

## 2023-02-09 DIAGNOSIS — J44.9 COPD, VERY SEVERE: Primary | ICD-10-CM

## 2023-02-09 PROCEDURE — 94625 PHY/QHP OP PULM RHB W/O MNTR: CPT

## 2023-02-16 ENCOUNTER — TREATMENT (OUTPATIENT)
Dept: CARDIAC REHAB | Facility: HOSPITAL | Age: 63
End: 2023-02-16
Payer: COMMERCIAL

## 2023-02-16 DIAGNOSIS — J44.9 COPD, VERY SEVERE: Primary | ICD-10-CM

## 2023-02-16 PROCEDURE — 94625 PHY/QHP OP PULM RHB W/O MNTR: CPT

## 2023-02-21 ENCOUNTER — TREATMENT (OUTPATIENT)
Dept: CARDIAC REHAB | Facility: HOSPITAL | Age: 63
End: 2023-02-21
Payer: COMMERCIAL

## 2023-02-21 DIAGNOSIS — J44.9 COPD, VERY SEVERE: Primary | ICD-10-CM

## 2023-02-21 PROCEDURE — G0238 OTH RESP PROC, INDIV: HCPCS

## 2023-02-23 ENCOUNTER — TREATMENT (OUTPATIENT)
Dept: CARDIAC REHAB | Facility: HOSPITAL | Age: 63
End: 2023-02-23
Payer: COMMERCIAL

## 2023-02-23 DIAGNOSIS — J44.9 COPD, VERY SEVERE: Primary | ICD-10-CM

## 2023-02-23 PROCEDURE — 94625 PHY/QHP OP PULM RHB W/O MNTR: CPT

## 2023-02-28 ENCOUNTER — APPOINTMENT (OUTPATIENT)
Dept: CARDIAC REHAB | Facility: HOSPITAL | Age: 63
End: 2023-02-28
Payer: COMMERCIAL

## 2023-03-02 ENCOUNTER — TREATMENT (OUTPATIENT)
Dept: CARDIAC REHAB | Facility: HOSPITAL | Age: 63
End: 2023-03-02
Payer: COMMERCIAL

## 2023-03-02 DIAGNOSIS — J44.9 COPD, VERY SEVERE: Primary | ICD-10-CM

## 2023-03-02 PROCEDURE — 94625 PHY/QHP OP PULM RHB W/O MNTR: CPT

## 2023-03-07 ENCOUNTER — TREATMENT (OUTPATIENT)
Dept: CARDIAC REHAB | Facility: HOSPITAL | Age: 63
End: 2023-03-07
Payer: COMMERCIAL

## 2023-03-07 DIAGNOSIS — J44.9 COPD, VERY SEVERE: Primary | ICD-10-CM

## 2023-03-07 PROCEDURE — 94625 PHY/QHP OP PULM RHB W/O MNTR: CPT

## 2023-03-09 ENCOUNTER — TREATMENT (OUTPATIENT)
Dept: CARDIAC REHAB | Facility: HOSPITAL | Age: 63
End: 2023-03-09
Payer: COMMERCIAL

## 2023-03-09 DIAGNOSIS — J44.9 COPD, VERY SEVERE: Primary | ICD-10-CM

## 2023-03-09 PROCEDURE — 94625 PHY/QHP OP PULM RHB W/O MNTR: CPT

## 2023-03-14 ENCOUNTER — TREATMENT (OUTPATIENT)
Dept: CARDIAC REHAB | Facility: HOSPITAL | Age: 63
End: 2023-03-14
Payer: COMMERCIAL

## 2023-03-14 DIAGNOSIS — J44.9 COPD, VERY SEVERE: Primary | ICD-10-CM

## 2023-03-14 PROCEDURE — 94625 PHY/QHP OP PULM RHB W/O MNTR: CPT

## 2023-03-16 ENCOUNTER — TREATMENT (OUTPATIENT)
Dept: CARDIAC REHAB | Facility: HOSPITAL | Age: 63
End: 2023-03-16
Payer: COMMERCIAL

## 2023-03-16 DIAGNOSIS — J44.9 COPD, VERY SEVERE: Primary | ICD-10-CM

## 2023-03-16 PROCEDURE — 94625 PHY/QHP OP PULM RHB W/O MNTR: CPT

## 2023-03-17 DIAGNOSIS — E53.8 B12 DEFICIENCY: Primary | ICD-10-CM

## 2023-03-17 RX ORDER — CYANOCOBALAMIN 1000 UG/ML
1000 INJECTION, SOLUTION INTRAMUSCULAR; SUBCUTANEOUS
Qty: 3 ML | Refills: 3 | Status: SHIPPED | OUTPATIENT
Start: 2023-03-17 | End: 2023-03-22 | Stop reason: SDUPTHER

## 2023-03-17 NOTE — TELEPHONE ENCOUNTER
Caller: Rory Rocío L    Relationship: Self    Best call back number:   [phone]    Requested Prescriptions:   Requested Prescriptions     Pending Prescriptions Disp Refills   • cyanocobalamin 1000 MCG/ML injection 3 mL 3     Sig: Inject 1 mL into the appropriate muscle as directed by prescriber Every 30 (Thirty) Days.        Pharmacy where request should be sent: [id number] [place], [place] - [address] AT [facility] & [place] [phone] [place] [phone] FX     Additional details provided by patient:SCHEDULED FOR DR. ERNST IN MAY, WAS HIS PATIENT IN THE PAST AS WELL.  WILL BE SEEN AT [facility]     Does the patient have less than a 3 day supply:  [x] Yes  [] No    Would you like a call back once the refill request has been completed: [x] Yes [] No    If the office needs to give you a call back, can they leave a voicemail: [x] Yes [] No    Enma Hadley Rep   03/17/23 12:23 EDT

## 2023-03-17 NOTE — TELEPHONE ENCOUNTER
lov 12/20/2022    Had an appointment 3/17/2023 that was canceled by provider    Nov 5/25/2023    Last fill 2/18/2022

## 2023-03-21 ENCOUNTER — TREATMENT (OUTPATIENT)
Dept: CARDIAC REHAB | Facility: HOSPITAL | Age: 63
End: 2023-03-21
Payer: COMMERCIAL

## 2023-03-21 DIAGNOSIS — J44.9 COPD, VERY SEVERE: Primary | ICD-10-CM

## 2023-03-21 PROCEDURE — 94625 PHY/QHP OP PULM RHB W/O MNTR: CPT

## 2023-03-22 ENCOUNTER — TELEPHONE (OUTPATIENT)
Dept: FAMILY MEDICINE CLINIC | Facility: CLINIC | Age: 63
End: 2023-03-22
Payer: COMMERCIAL

## 2023-03-22 DIAGNOSIS — E53.8 B12 DEFICIENCY: ICD-10-CM

## 2023-03-22 RX ORDER — CYANOCOBALAMIN 1000 UG/ML
1000 INJECTION, SOLUTION INTRAMUSCULAR; SUBCUTANEOUS
Qty: 3 ML | Refills: 3 | Status: SHIPPED | OUTPATIENT
Start: 2023-03-22 | End: 2023-03-22 | Stop reason: SDUPTHER

## 2023-03-22 RX ORDER — CYANOCOBALAMIN 1000 UG/ML
1000 INJECTION, SOLUTION INTRAMUSCULAR; SUBCUTANEOUS
Qty: 3 ML | Refills: 3 | Status: SHIPPED | OUTPATIENT
Start: 2023-03-22

## 2023-03-22 NOTE — TELEPHONE ENCOUNTER
Medication originally sent to pharmacy 03/17/23, I called pharmacy and they never received it so I'm resending to pharmacy today.

## 2023-03-22 NOTE — TELEPHONE ENCOUNTER
Caller: Rocío Valadez    Relationship: Self    Best call back number: [phone]    What is the best time to reach you: ANYTIME   Who are you requesting to speak with (clinical staff, provider,  specific staff member): CLINICAL STAFF  Do you know the name of the person who called:YES     What was the call regarding: PATIENT CALLED AND WOULD LIKE TO KNOW WHY HER VITAMIN B-12 WAS DENIED. PATIENT NEEDS FOR YOU TO CALL HER PLEASE. THANKS   Do you require a callback: YES

## 2023-03-23 ENCOUNTER — TREATMENT (OUTPATIENT)
Dept: CARDIAC REHAB | Facility: HOSPITAL | Age: 63
End: 2023-03-23
Payer: COMMERCIAL

## 2023-03-23 DIAGNOSIS — J44.9 COPD, VERY SEVERE: Primary | ICD-10-CM

## 2023-03-23 PROCEDURE — 94625 PHY/QHP OP PULM RHB W/O MNTR: CPT

## 2023-03-28 ENCOUNTER — APPOINTMENT (OUTPATIENT)
Dept: CARDIAC REHAB | Facility: HOSPITAL | Age: 63
End: 2023-03-28
Payer: COMMERCIAL

## 2023-03-30 ENCOUNTER — TREATMENT (OUTPATIENT)
Dept: CARDIAC REHAB | Facility: HOSPITAL | Age: 63
End: 2023-03-30
Payer: COMMERCIAL

## 2023-03-30 DIAGNOSIS — J44.9 COPD, VERY SEVERE: Primary | ICD-10-CM

## 2023-03-30 PROCEDURE — 94625 PHY/QHP OP PULM RHB W/O MNTR: CPT

## 2023-04-04 ENCOUNTER — TREATMENT (OUTPATIENT)
Dept: CARDIAC REHAB | Facility: HOSPITAL | Age: 63
End: 2023-04-04
Payer: COMMERCIAL

## 2023-04-04 DIAGNOSIS — J44.9 COPD, VERY SEVERE: Primary | ICD-10-CM

## 2023-04-04 PROCEDURE — 94625 PHY/QHP OP PULM RHB W/O MNTR: CPT

## 2023-04-11 ENCOUNTER — APPOINTMENT (OUTPATIENT)
Dept: CARDIAC REHAB | Facility: HOSPITAL | Age: 63
End: 2023-04-11
Payer: COMMERCIAL

## 2023-04-13 ENCOUNTER — APPOINTMENT (OUTPATIENT)
Dept: CARDIAC REHAB | Facility: HOSPITAL | Age: 63
End: 2023-04-13
Payer: COMMERCIAL

## 2023-04-13 ENCOUNTER — TELEPHONE (OUTPATIENT)
Dept: FAMILY MEDICINE CLINIC | Facility: CLINIC | Age: 63
End: 2023-04-13
Payer: COMMERCIAL

## 2023-04-13 NOTE — TELEPHONE ENCOUNTER
KENNEDY FOR HUB TO READ AND SCHEDULE:    [facility] [place] wanted the patient to be seen by Dr. Calixto today for an acute visit. Okay per Dr. Calixto. We can schedule a video visit for the patient. Left message and told patient to ask for Clare or Dottie.

## 2023-04-24 RX ORDER — ERGOCALCIFEROL 1.25 MG/1
CAPSULE ORAL
Qty: 12 CAPSULE | Refills: 0 | Status: SHIPPED | OUTPATIENT
Start: 2023-04-24

## 2023-04-24 RX ORDER — TERIPARATIDE 250 UG/ML
20 INJECTION, SOLUTION SUBCUTANEOUS DAILY
COMMUNITY
Start: 2023-03-10

## 2023-04-24 RX ORDER — FLUTICASONE PROPIONATE 50 MCG
SPRAY, SUSPENSION (ML) NASAL
COMMUNITY

## 2023-04-24 RX ORDER — PRUCALOPRIDE 2 MG/1
1 TABLET, FILM COATED ORAL DAILY
COMMUNITY
Start: 2023-03-20

## 2023-04-24 NOTE — TELEPHONE ENCOUNTER
LOV           9/14/22      NOV          5/25/23  MO  Last RF     1/31/23    Rocío Sharp, NRCMA/LMR

## 2023-04-28 DIAGNOSIS — I10 BENIGN ESSENTIAL HYPERTENSION: ICD-10-CM

## 2023-04-28 RX ORDER — TRIAMTERENE AND HYDROCHLOROTHIAZIDE 37.5; 25 MG/1; MG/1
1 TABLET ORAL DAILY
Qty: 30 TABLET | Refills: 1 | Status: SHIPPED | OUTPATIENT
Start: 2023-04-28

## 2023-04-28 NOTE — TELEPHONE ENCOUNTER
Rx Refill Note  Requested Prescriptions     Pending Prescriptions Disp Refills   • triamterene-hydrochlorothiazide (MAXZIDE-25) 37.5-25 MG per tablet [Pharmacy Med Name: TRIAMTERENE 37.5MG/ HCTZ 25MG TABS] 90 tablet 0     Sig: TAKE 1 TABLET BY MOUTH DAILY      Last office visit with prescribing clinician: Visit date not found   Last telemedicine visit with prescribing clinician: Visit date not found   Next office visit with prescribing clinician: Visit date not found                         Would you like a call back once the refill request has been completed: [] Yes [] No    If the office needs to give you a call back, can they leave a voicemail: [] Yes [] No    Margareth Perea MA  04/28/23, 11:57 EDT

## 2023-04-28 NOTE — TELEPHONE ENCOUNTER
Patient has appointment in may but has not been seen since December. I will fill a 30 day and 1 refill to get her to the next appointment no refills until patient is seen

## 2023-05-30 DIAGNOSIS — I10 BENIGN ESSENTIAL HYPERTENSION: ICD-10-CM

## 2023-05-30 RX ORDER — TRIAMTERENE AND HYDROCHLOROTHIAZIDE 37.5; 25 MG/1; MG/1
1 TABLET ORAL DAILY
Qty: 30 TABLET | Refills: 1 | Status: SHIPPED | OUTPATIENT
Start: 2023-05-30

## 2023-05-30 NOTE — TELEPHONE ENCOUNTER
Caller: Rcoío Valadez PELON    Relationship: Self    Best call back number: [phone]    Requested Prescriptions:   Requested Prescriptions     Pending Prescriptions Disp Refills   • triamterene-hydrochlorothiazide (MAXZIDE-25) 37.5-25 MG per tablet 30 tablet 1     Sig: Take 1 tablet by mouth Daily.        Pharm[facility] where request should be sent[id number] [address] AT [facility] & [place] [phone] [facility] [phone]      Last office visit with prescribing clinician: 8/16/2022   Last telemedicine visit with prescribing clinician: Visit date not found   Next office visit with prescribing clinician: 7/17/2023     Additional details provided by patient: PATIENT STATES SHE IS COMPLETLEY OUT OF THIS MEDICATION AND IS REQUESTING A REFILL TO AT LEAST GET HER TO HER APPOINTMENT IN July WITH DR. BRODERICK.     Does the patient have less than a 3 day supply:  [x] Yes  [] No    Would you like a call back once the refill request has been completed: [x] Yes [] No    If the office needs to give you a call back, can they leave a voicemail: [x] Yes [] No    Gillian Blevins, PCT   05/30/23 11:41 EDT

## 2023-07-17 PROBLEM — M81.0 OSTEOPOROSIS: Status: ACTIVE | Noted: 2023-01-26

## 2023-07-17 PROBLEM — M25.541 ARTHRALGIA OF BOTH HANDS: Status: ACTIVE | Noted: 2023-07-17

## 2023-07-17 PROBLEM — M25.542 ARTHRALGIA OF BOTH HANDS: Status: ACTIVE | Noted: 2023-07-17

## 2023-08-15 ENCOUNTER — OFFICE VISIT (OUTPATIENT)
Dept: SURGERY | Facility: CLINIC | Age: 63
End: 2023-08-15
Payer: COMMERCIAL

## 2023-08-15 VITALS
WEIGHT: 110.4 LBS | HEART RATE: 105 BPM | DIASTOLIC BLOOD PRESSURE: 90 MMHG | BODY MASS INDEX: 21.68 KG/M2 | HEIGHT: 60 IN | SYSTOLIC BLOOD PRESSURE: 160 MMHG

## 2023-08-15 DIAGNOSIS — K59.00 CONSTIPATION, UNSPECIFIED CONSTIPATION TYPE: Primary | ICD-10-CM

## 2023-08-15 DIAGNOSIS — K64.8 INTERNAL HEMORRHOIDS WITH COMPLICATION: ICD-10-CM

## 2023-08-15 RX ORDER — HYDROCORTISONE 25 MG/G
CREAM TOPICAL
Qty: 30 G | Refills: 1 | Status: SHIPPED | OUTPATIENT
Start: 2023-08-15

## 2023-08-15 RX ORDER — LACTULOSE 10 G/15ML
10 SOLUTION ORAL 2 TIMES DAILY
Qty: 900 ML | Refills: 0 | Status: SHIPPED | OUTPATIENT
Start: 2023-08-15 | End: 2023-09-14

## 2023-08-15 NOTE — PROGRESS NOTES
"Rocío Valadez is a 63 y.o. female in for follow up of rectal pain.    Complains of hemorrhoids ongoing since 06/2021. Her hemorrhoids bleed occasionally with bowel movements and she has had episodes of blood dripping into the toilet. She denies recent episodes of bleeding. Her hemorrhoids become painful after a bowel movement. Her bowel movements alternate between constipated and diarrhea. She experiences lower back pain when she has not had a bowel movement for 5 to 7 days. She takes laxatives, which only provide intermittent relief, and she has occasional episodes of explosive diarrhea. She has tried Dulcolax from 2 to 4 tablets in a day, which provided no relief. She has taken Motegrity daily in the past, which did not make a difference. She is currently taking Motegrity and Linzess as needed. She eats adequate dietary fiber. She was prescribed Anusol, which was not covered by insurance. She was switched to FiberCon and this did not improve her constipation. She has tried MiraLAX, which did not provide benefit. She has been prescribed hemorrhoid cream, which is soothing, but it does not resolve her hemorrhoids. She uses this at night. She had a colonoscopy in 06/2021. She had a couple of polyps removed, one of which was large in size, and it was not properly cauterized. She experienced internal bleeding, which required a blood transfusion. She was admitted to the hospital for 4 days.     She describes a sensation like something is outside of her rectum and she is able to pull it inside when she squeezes her sphincter. She does not have a prior history of hemorrhoids before 06/2021. Since her polypectomy, she has mucus with bowel movements occasionally. She has a watery discharge after bowel movements, and she has to wear a pad.    /90 (BP Location: Left arm, Patient Position: Sitting, Cuff Size: Small Adult) Comment: pt having sob d/t copd  Pulse 105   Ht 152.4 cm (60\")   Wt 50.1 kg (110 lb 6.4 oz)   " Breastfeeding No   BMI 21.56 kg/mý   Body mass index is 21.56 kg/mý.  -  Physical Exam  No acute distress  Chaperone present  Perianal exam: external hemorrhoids, minimally enlarged with small to medium sized anal skin tags. ARYA good tone, no masses. Anoscopy performed, grade 1-3 internal hemorrhoids x3. Internal hemorrhoids are also visible on Valsalva externally and appear irritated.    Assessment:   1. Constipation, unspecified constipation type    2. Internal hemorrhoids with complication         Plan:  For the hemorrhoids, I advised the patient to start a fiber supplement to a daily total intake of 30 to 40 g for stool consistency.  I provided detailed instructions.   I prescribed Anusol cream for hemorrhoids and gave instructions for use, including that cream is not for daily long-term use. Follow up in 4-6 weeks for reevaluation. Call with any questions, concerns, or worsening symptoms.        Christie Ramirez PA-C  Physician Assistant  Colorectal Surgery    Transcribed from ambient dictation for Christie Ramirez PA-C by Silvia Celeste.  08/15/23   16:36 EDT    Patient or patient representative verbalized consent to the visit recording.  I have personally performed the services described in this document as transcribed by the above individual, and it is both accurate and complete.

## 2023-08-24 ENCOUNTER — TELEPHONE (OUTPATIENT)
Dept: SURGERY | Facility: CLINIC | Age: 63
End: 2023-08-24
Payer: COMMERCIAL

## 2023-08-24 NOTE — TELEPHONE ENCOUNTER
I have requested it.    ----- Message from Rocío Valadez sent at 8/23/2023  3:29 PM EDT -----  Regarding: Colonoscopy  Contact: [phone]  I had it at [facility] dr Rachel June of 21 .

## 2023-08-29 ENCOUNTER — PREP FOR SURGERY (OUTPATIENT)
Dept: OTHER | Facility: HOSPITAL | Age: 63
End: 2023-08-29
Payer: COMMERCIAL

## 2023-08-29 NOTE — PROGRESS NOTES
Received and reviewed colonoscopy report and associated pathology report from colonoscopy performed on 6/3/2021.  This was performed at [facility] by Dr. Cruz Rachel.  There was a 4 mm polyp in the hepatic flexure consistent with tubular adenoma, as well as a 20 mm polyp in the rectosigmoid colon consistent with tubular adenoma.  There was sigmoid diverticulosis with associated narrowing.  The rectosigmoid and distal sigmoid colon were tortuous.  There were nonbleeding internal hemorrhoids found.  The exam was otherwise normal.  Recall is handwritten on the pathology report to be 3 years.  There is also a handwritten note that patient subsequently was admitted with a GI bleed of unclear source. Due to presence of 20mm polyp, I recommend the patient be scheduled for a colonoscopy sooner than 3 years if cleared for MAC sedation with history of COPD on supplemental oxygen, on lung transplant list. I will have our  call her to set up a date and time for colonoscopy and possible rubber band ligation of internal hemorrhoids with Dr. Castro, if approved from pulmonary standpoint, as the patient no longer wishes to see the provider that performed her previous colonoscopy.  I discussed with the patient the rubber band ligation procedure, including post procedure pain, bleeding, risk of infection.  Patient verbalizes understanding and agrees to plan.

## 2023-09-01 ENCOUNTER — PREP FOR SURGERY (OUTPATIENT)
Dept: OTHER | Facility: HOSPITAL | Age: 63
End: 2023-09-01
Payer: COMMERCIAL

## 2023-09-01 DIAGNOSIS — K62.5 RECTAL BLEEDING: Primary | ICD-10-CM

## 2023-09-11 RX ORDER — LACTULOSE 10 G/15ML
SOLUTION ORAL
Qty: 946 ML | Refills: 3 | Status: SHIPPED | OUTPATIENT
Start: 2023-09-11

## 2023-09-12 PROBLEM — K62.5 RECTAL BLEEDING: Status: ACTIVE | Noted: 2023-09-12

## 2023-10-09 ENCOUNTER — TELEPHONE (OUTPATIENT)
Dept: FAMILY MEDICINE CLINIC | Facility: CLINIC | Age: 63
End: 2023-10-09

## 2023-10-09 ENCOUNTER — OFFICE VISIT (OUTPATIENT)
Dept: SURGERY | Facility: CLINIC | Age: 63
End: 2023-10-09
Payer: COMMERCIAL

## 2023-10-09 VITALS
DIASTOLIC BLOOD PRESSURE: 96 MMHG | HEIGHT: 60 IN | WEIGHT: 108.7 LBS | SYSTOLIC BLOOD PRESSURE: 186 MMHG | BODY MASS INDEX: 21.34 KG/M2 | HEART RATE: 84 BPM | OXYGEN SATURATION: 98 %

## 2023-10-09 DIAGNOSIS — K59.00 CONSTIPATION, UNSPECIFIED CONSTIPATION TYPE: ICD-10-CM

## 2023-10-09 DIAGNOSIS — Z12.31 SCREENING MAMMOGRAM FOR BREAST CANCER: Primary | ICD-10-CM

## 2023-10-09 DIAGNOSIS — K64.8 INTERNAL HEMORRHOIDS WITH COMPLICATION: Primary | ICD-10-CM

## 2023-10-09 PROCEDURE — 99214 OFFICE O/P EST MOD 30 MIN: CPT | Performed by: PHYSICIAN ASSISTANT

## 2023-10-09 RX ORDER — LUBIPROSTONE 8 UG/1
8 CAPSULE ORAL 2 TIMES DAILY WITH MEALS
Qty: 60 CAPSULE | Refills: 0 | Status: SHIPPED | OUTPATIENT
Start: 2023-10-09 | End: 2023-11-08

## 2023-10-09 NOTE — TELEPHONE ENCOUNTER
Caller: SANTINO    Relationship to patient: Other    Best call back number: [phone]    Patient is needing: SANTINO IS CALLING FROM [facility] STATING THAT THE PATIENT IS SCHEDULED TOMORROW 10/10 FOR A MAMMOGRAM AT 2:00 PM. AND THEY ARE NEEDING AN ORDER FAXED TO THEM FOR THAT.  THE FAX NUMBER  [phone].

## 2023-10-09 NOTE — PROGRESS NOTES
"Rocío Valadez is a 63 y.o. female in for follow up of constipation, hemorrhoids.  At her most recent visit in our office on 8/15/2023, she was recommended to start a fiber supplement and use Anusol hemorrhoid cream. She is scheduled for a colonoscopy with possible rubber band ligation of internal hemorrhoids on 12/4/2023.    Hemorrhoids are about the same. Couldn't tolerate lactulose due to abdominal pain, bloated, gassyness. Feels like needs to have BM, but is not evacuating much in quantity. Then the hemorrhoids really flare. Only rare faint bleeding with wiping. Some throbbing associated with the hemorrhoid flares. Has BM 2-3 times per week, but very small quantities. Thin stool caliber. Cedar 1 or 4. Has been told has slow colon but hasn't had sitz marker study.  + straining. Takes fibercon--2 in am and 2 in pm. Using Anusol for hemorrhoid flares.  Feels need to go but can't get it all out. Has clear/brown drainage after BMs. Taking miralax--1-2 doses in morning.     Linzess 145 mg was not strong enough, 290 mg caused explosive BMs and then became ineffective. Has tried trulance and motegrity. Hasn't tried amitiza.     Denies current headache, lightheadedness, other concerning symptoms related to elevated blood pressure. Will follow up with PCP.     BP (!) 186/96 (BP Location: Right arm, Patient Position: Sitting, Cuff Size: Small Adult) Comment: PT SAIS BD RUNNING HIGH LATELY WILL F/U WITH PCP  Pulse 84   Ht 152.4 cm (60\")   Wt 49.3 kg (108 lb 11.2 oz)   SpO2 98%   Breastfeeding No   BMI 21.23 kg/mý   Body mass index is 21.23 kg/mý.  -  Physical Exam  No acute distress    Assessment:   1. Internal hemorrhoids with complication    2. Constipation, unspecified constipation type         Plan:  Increase miralax to twice daily dosing  Try amitiza  Colonoscopy in 2 months. Follow up here after that, or sooner if needed.        Christie Ramirez PA-C  Physician Assistant  Colorectal Surgery    "

## 2023-10-20 DIAGNOSIS — Z12.31 SCREENING MAMMOGRAM FOR BREAST CANCER: ICD-10-CM

## 2023-10-24 ENCOUNTER — OFFICE VISIT (OUTPATIENT)
Dept: FAMILY MEDICINE CLINIC | Facility: CLINIC | Age: 63
End: 2023-10-24
Payer: COMMERCIAL

## 2023-10-24 ENCOUNTER — PATIENT MESSAGE (OUTPATIENT)
Dept: FAMILY MEDICINE CLINIC | Facility: CLINIC | Age: 63
End: 2023-10-24

## 2023-10-24 VITALS
OXYGEN SATURATION: 92 % | BODY MASS INDEX: 21.09 KG/M2 | WEIGHT: 107.4 LBS | SYSTOLIC BLOOD PRESSURE: 188 MMHG | DIASTOLIC BLOOD PRESSURE: 86 MMHG | HEIGHT: 60 IN | HEART RATE: 106 BPM | TEMPERATURE: 98.4 F

## 2023-10-24 DIAGNOSIS — I10 BENIGN ESSENTIAL HYPERTENSION: Primary | ICD-10-CM

## 2023-10-24 DIAGNOSIS — I10 BENIGN ESSENTIAL HYPERTENSION: ICD-10-CM

## 2023-10-24 DIAGNOSIS — Z23 IMMUNIZATION DUE: ICD-10-CM

## 2023-10-24 RX ORDER — ROMOSOZUMAB-AQQG 105 MG/1.17ML
210 INJECTION, SOLUTION SUBCUTANEOUS
COMMUNITY

## 2023-10-24 RX ORDER — AMLODIPINE BESYLATE 5 MG/1
5 TABLET ORAL DAILY
Qty: 30 TABLET | Refills: 1 | Status: SHIPPED | OUTPATIENT
Start: 2023-10-24

## 2023-10-24 NOTE — PROGRESS NOTES
Subjective   Rocío Valadez is a 63 y.o. female.     Chief Complaint   Patient presents with    Hypertension     She has noticed her blood pressures have been higher then normal for the past few weeks. She went to another doctor and it was high and she has been checking it since then. She has been having headaches. Yesterday she took an extra dose of her medication because it went up to 200. She said when she took it it still took it awhile to come down.       History of Present Illness   Follow-up for hypertension.  Currently, has been feeling well and asymptomatic without any vision changes, cough, chest pain, shortness of breath, swelling, focal neurologic deficit, memory loss or syncope.  Has been having some headaches.  Has been taking the medications regularly and adherent with the regimen of Maxide 25 daily.  Denies medication side effects and no significant interval events.  Blood pressure has been elevating and up to 200 for the top number at home.     Follow-up for cholesterol.  Currently, has been feeling well without any myalgias, muscle aches, weakness, numbness, chest pain, short of breath or other issues.  Currently, is adherent with medication regimen of atorvastatin 10 mg and denies medication side effects. Is due for lab follow-up.     History of GERD currently controlled use of the Dexilant 60 mg daily (in past had tried omeprazole 40 mg and nexium 40 mg daily with persistent gastritis by endoscopy with Dr Galeano.    Known arthritic issues utilizing the meloxicam and tolerating well.     History of vitamin D deficiency on vitamin D 50,000 units weekly.     Is being evaluated for lung transplant but the [place] transplant program is on pause.     Patient with arthritis pain and osteoporosis.  Currently on forteo.  Ana partida developed right greater than left 5th finger with knot on palmar aspect and flexion contracture.  Has a lot of arthritis pain in the hands.     The following portions of the  patient's history were reviewed and updated as appropriate: allergies, current medications, past family history, past medical history, past social history, past surgical history and problem list.    Depression Screen:      7/17/2023     3:44 PM   PHQ-2/PHQ-9 Depression Screening   Little Interest or Pleasure in Doing Things 0-->not at all   Feeling Down, Depressed or Hopeless 0-->not at all   PHQ-9: Brief Depression Severity Measure Score 0       Past Medical History:   Diagnosis Date    Allergic rhinitis     FOLLOWED BY DR. DOMINICK QUIROZ    Anemia 08/2021    Arthritis     Asthma     Atypical chest pain     B12 deficiency 02/16/2022    Solares's esophagus     Benign essential hypertension     Bloating     Carrier of alpha-1-antitrypsin deficiency     Cervical muscle strain 01/08/2020    Chronic idiopathic constipation     Chronic respiratory failure with hypercapnia     Chronic vaginitis     Colon polyps     FOLLOWED BY DR. MARYJO YBARRA    COPD (chronic obstructive pulmonary disease)     SEVERE, CONSULTING FOR LUNG TRANSPLANT    Cough     COVID-19     Cystitis 09/2022    Diverticulitis of colon     Dyspareunia in female     Dysphagia     Early satiety     Environmental allergies     Gallbladder sludge     GERD (gastroesophageal reflux disease)     Greater trochanteric bursitis of left hip 08/2022    Headache     Hemorrhoids     Hiatal hernia     History of transfusion 6/20    Due to colon    Hyperlipidemia     Irritable bowel syndrome     Low back pain     Mucous retention cyst of salivary gland 04/2022    OF TONSIL, OFFERED REMOVAL, PT DECLINE, WATCHFUL FOLLOW UP, SEEN BY DR. HUSSAIN SINCLAIR    Oral thrush 06/2020    Osteoporosis     Oxygen dependent 08/12/2021    PNA (pneumonia) 06/2018    PNA (pneumonia) 08/29/2022    ADMITTED TO [facility]    Positive colorectal cancer screening using Cologuard test 04/2021    Postmenopausal HRT (hormone replacement therapy)     Postoperative urinary retention     Pulmonary  nodule, right     Rotator cuff tear, right 06/2020    S/P REPAIR    Throat clearing     Vaginal atrophy     Vitamin D deficiency 02/16/2022       Past Surgical History:   Procedure Laterality Date    ABDOMINAL HYSTERECTOMY N/A 1994    PHIL WITH BSO, fibroid tumors and menorhagia    BREAST SURGERY Bilateral     enlargement procedure. [facility]    BRONCHOSCOPY N/A 12/31/2014    WITH ALVEOLAR LAVAGE    CARDIAC CATHETERIZATION Right 09/01/2022    NORMAL CORONARY ARTERIES, NO EVIDENCE OF OBSTRUCTIVE CAD, DR. ALEXIA PACE AT [facility]    COLONOSCOPY N/A 2008    WNL    COLONOSCOPY W/ POLYPECTOMY N/A 06/03/2021    TORTUOUS COLON, 4 MM POLYP IN HEPATIC FLEXURE, 20 MM POLYP IN RECTOSIGMOID, SIGMOID DIVERTICULA WITH NARROWING, HEMORRHOIDS, RESCOPE IN 3 YRS, DR. MARYJO YBARRA AT [facility]    COLONOSCOPY W/ POLYPECTOMY N/A 03/10/2016    2 BENIGN POLYPS IN RECTOSIGMOID, 1 BENIGN POLYP IN TRANSVERSE, SEVERE DIVERTICULOSIS IN SIGMOID, MODERATE DIVERTICULOSIS THROUGHOUT, RESCOPE IN 5 YRS, DR. MARYJO YBARRA AT [facility]    ENDOSCOPY N/A 03/10/2016    ESOPHAGITIS, Z LINE IRREGULAR, SMALL HIATAL HERNIA, ESOPHAGUS SPASMS, DR. MARYJO YBARRA AT [facility]    ENDOSCOPY N/A 02/09/2021    ESOPHAGEAL STENOSIS DILATED, DR. TASHA YBARRA AT [facility]    SHOULDER ARTHROSCOPY Right 06/17/2020    Procedure: SHOULDER ARTHROSCOPY  ROTATOR CUFF REPAIR ACROMIOPLASTY  PRP AUGMENTATION ;  Surgeon: Isidoro Albrecht MD;  Location: [facility];  Service: Orthopedics;  Laterality: Right;    TUBAL ABDOMINAL LIGATION Bilateral        Family History   Problem Relation Age of Onset    COPD Mother     Diabetes Mother     Hypertension Mother     Heart disease Mother     Arthritis Mother     Heart attack Mother     Aneurysm Father     Hypertension Father     Cancer Brother     Liver cancer Brother     Cirrhosis Brother     Thyroid disease Daughter     Diabetes Maternal Grandmother     Cancer Maternal Grandmother     Lung cancer Maternal  Grandmother     José Antonio Hyperthermia Neg Hx        Social History     Socioeconomic History    Marital status:    Tobacco Use    Smoking status: Former     Packs/day: 1.50     Years: 30.00     Additional pack years: 0.00     Total pack years: 45.00     Types: Cigarettes     Start date: 1/1/1989     Quit date: 1/1/2013     Years since quitting: 10.8     Passive exposure: Past    Smokeless tobacco: Never    Tobacco comments:     QUIT 7 YEARS AGO   Vaping Use    Vaping Use: Never used   Substance and Sexual Activity    Alcohol use: Not Currently     Alcohol/week: 2.0 standard drinks of alcohol     Types: 2 Glasses of wine per week     Comment: OCCASIONALLY     Drug use: Never    Sexual activity: Yes     Partners: Male     Birth control/protection: Post-menopausal, Tubal ligation, Hysterectomy       Current Outpatient Medications   Medication Sig Dispense Refill    amitriptyline (ELAVIL) 25 MG tablet Take 1 tablet by mouth every night at bedtime. 90 tablet 3    atorvastatin (LIPITOR) 20 MG tablet Take 1 tablet by mouth Daily.      azithromycin (ZITHROMAX) 250 MG tablet 1 tablet every Monday, Wednesday, and Friday      budesonide-formoterol (SYMBICORT) 160-4.5 MCG/ACT inhaler Inhale 2 puffs 2 (Two) Times a Day.      cyanocobalamin 1000 MCG/ML injection Inject 1 mL into the appropriate muscle as directed by prescriber Every 30 (Thirty) Days. 3 mL 3    DALIRESP 500 MCG tablet tablet Take 1 tablet by mouth Daily.      dexlansoprazole (Dexilant) 60 MG capsule Take 1 capsule by mouth Daily. 90 capsule 4    Hydrocortisone, Perianal, (Anusol-HC) 2.5 % rectal cream Apply to hemorrhoids 3 times daily for 7 days during hemorrhoid flare. Include applicator. Use for 7 days, then take a break for 7 days before resuming this pattern. 30 g 1    lubiprostone (Amitiza) 8 MCG capsule Take 1 capsule by mouth 2 (Two) Times a Day With Meals for 30 days. 60 capsule 0    mupirocin (BACTROBAN) 2 % ointment APPLY TOPICALLY TO NASAL SORE  TWICE DAILY      PROAIR  (90 Base) MCG/ACT inhaler Inhale 1 puff Every 4 (Four) Hours As Needed.      tiotropium (SPIRIVA) 18 MCG per inhalation capsule Place 1 capsule into inhaler and inhale Daily.      tretinoin (RETIN-A) 0.025 % cream Apply 1 application  topically to the appropriate area as directed Every Night.      triamterene-hydrochlorothiazide (MAXZIDE-25) 37.5-25 MG per tablet Take 1 tablet by mouth Daily. 90 tablet 2    vitamin D (ERGOCALCIFEROL) 1.25 MG (36589 UT) capsule capsule TAKE 1 CAPSULE BY MOUTH 1 TIME EVERY WEEK 12 capsule 0    amLODIPine (NORVASC) 5 MG tablet Take 1 tablet by mouth Daily. 30 tablet 1    Romosozumab-aqqg (Evenity) 105 MG/1.17ML subcutaneous solution Inject 2.34 mL under the skin into the appropriate area as directed Every 30 (Thirty) Days.       No current facility-administered medications for this visit.       Review of Systems   Constitutional:  Negative for activity change, appetite change, fatigue, fever, unexpected weight gain and unexpected weight loss.   HENT:  Negative for nosebleeds, rhinorrhea, trouble swallowing and voice change.    Eyes:  Negative for visual disturbance.   Respiratory:  Positive for shortness of breath. Negative for cough, chest tightness and wheezing.    Cardiovascular:  Negative for chest pain, palpitations and leg swelling.   Gastrointestinal:  Negative for abdominal pain, blood in stool, constipation, diarrhea, nausea, vomiting, GERD and indigestion.   Genitourinary:  Negative for dysuria, frequency and hematuria.   Musculoskeletal:  Negative for arthralgias, back pain and myalgias.   Skin:  Negative for rash and wound.   Neurological:  Positive for headache. Negative for dizziness, tremors, weakness, light-headedness, numbness and memory problem.   Hematological:  Negative for adenopathy. Does not bruise/bleed easily.   Psychiatric/Behavioral:  Negative for sleep disturbance and depressed mood. The patient is not nervous/anxious.   "      Objective   BP (!) 188/86 (BP Location: Left arm, Patient Position: Sitting, Cuff Size: Adult)   Pulse 106   Temp 98.4 °F (36.9 °C) (Temporal)   Ht 152.4 cm (60\")   Wt 48.7 kg (107 lb 6.4 oz)   SpO2 92%   BMI 20.98 kg/m²     Physical Exam  Vitals and nursing note reviewed.   Constitutional:       General: She is not in acute distress.     Appearance: She is well-developed. She is not diaphoretic.   HENT:      Head: Normocephalic and atraumatic.      Right Ear: External ear normal.      Left Ear: External ear normal.      Nose: Nose normal.   Eyes:      Conjunctiva/sclera: Conjunctivae normal.      Pupils: Pupils are equal, round, and reactive to light.   Neck:      Thyroid: No thyromegaly.      Trachea: No tracheal deviation.   Cardiovascular:      Rate and Rhythm: Normal rate and regular rhythm.      Heart sounds: Normal heart sounds. No murmur heard.     No friction rub. No gallop.   Pulmonary:      Effort: Pulmonary effort is normal. No respiratory distress.      Breath sounds: Normal breath sounds.      Comments: Using NC oxygen  Abdominal:      General: Bowel sounds are normal.      Palpations: Abdomen is soft. There is no mass.      Tenderness: There is no abdominal tenderness. There is no guarding.   Musculoskeletal:         General: Normal range of motion.      Cervical back: Normal range of motion and neck supple.   Lymphadenopathy:      Cervical: No cervical adenopathy.   Skin:     General: Skin is warm and dry.      Capillary Refill: Capillary refill takes less than 2 seconds.      Findings: No rash.   Neurological:      Mental Status: She is alert and oriented to person, place, and time.      Motor: No abnormal muscle tone.      Deep Tendon Reflexes: Reflexes normal.   Psychiatric:         Behavior: Behavior normal.         Thought Content: Thought content normal.         Judgment: Judgment normal.         No results found for this or any previous visit (from the past 2016 " hour(s)).  Assessment & Plan   Diagnoses and all orders for this visit:    1. Benign essential hypertension (Primary)  -     amLODIPine (NORVASC) 5 MG tablet; Take 1 tablet by mouth Daily.  Dispense: 30 tablet; Refill: 1    2. Immunization due  -     Fluzone High-Dose 65+yrs (1915-9273)    Hypertension uncontrolled and very high.  Is currently asymptomatic but has had some headaches.  We will add amlodipine 5 mg daily.  Continue with the Maxide unchanged.  Follow-up in 1 to 2 weeks.  If her blood pressure remains elevated may increase the amlodipine or the Dyazide.  If she has any chest pain shortness of breath syncopal episodes weakness numbness etc. is to go emergency room.  We discussed immunizations and she is willing to have the influenza shot today.           COVID-19 Precautions - Patient was compliant in wearing a mask. When I saw the patient, I used appropriate personal protective equipment (PPE) including mask and eye shield (standard procedure).  Additionally, I used gown and gloves if indicated.  Hand hygiene was completed before and after seeing the patient.  Dictated utilizing Dragon Dictation

## 2023-10-25 ENCOUNTER — TELEPHONE (OUTPATIENT)
Dept: FAMILY MEDICINE CLINIC | Facility: CLINIC | Age: 63
End: 2023-10-25
Payer: COMMERCIAL

## 2023-10-25 NOTE — TELEPHONE ENCOUNTER
I called and spoke with the patient and let her know that she had to give the medicine time to build up in her system and start working. She said she is having a major headache and is nauseas. I assured her that these can be symptoms of high blood pressure and that if these symptoms were worse then yesterday then she should go to the ER. She asked why she wasn't seeing a difference in her BP and I explained that one pill is not enough to substantially bring down her blood pressure. She verbalized understanding and I advised her to give it at least a week before she really starts focusing on the blood pressure. She verbalized understanding and will reach back out to us in a week or if needed sooner than that.

## 2023-10-25 NOTE — TELEPHONE ENCOUNTER
Rocío was placed on new blood pressure meds and her bp was 154/83, 173/81, 188/98 and 172/79 last night, this morning it was 163/86.  Her heart rate is 99.  Please advise.

## 2023-10-30 ENCOUNTER — TELEPHONE (OUTPATIENT)
Dept: FAMILY MEDICINE CLINIC | Facility: CLINIC | Age: 63
End: 2023-10-30
Payer: COMMERCIAL

## 2023-10-30 NOTE — TELEPHONE ENCOUNTER
I called and spoke with the patient and let her know Dr Calixto advise she take another one of her amlodipine on top of what she is currently taking and she verbalized understanding. The patient is already scheduled for one week to come back in. She will call back if she needs anything in the meantime.

## 2023-10-30 NOTE — TELEPHONE ENCOUNTER
Patient states her bp has been running high high since her appointment last week.  It hasn't come down since starting the new medication Dr Calixto gave her.  She says her head has been pounding and she's been vomiting because her head hurt so bad.  She just took it this morning at 9, it was 186/86.  Last night it was 190/92.  She says it hasn't gone under 132 since she started taking the medication.  Her phone number is [phone].  She would like to know what she should do.

## 2023-11-06 ENCOUNTER — OFFICE VISIT (OUTPATIENT)
Dept: FAMILY MEDICINE CLINIC | Facility: CLINIC | Age: 63
End: 2023-11-06
Payer: COMMERCIAL

## 2023-11-06 VITALS
BODY MASS INDEX: 20.73 KG/M2 | DIASTOLIC BLOOD PRESSURE: 82 MMHG | SYSTOLIC BLOOD PRESSURE: 160 MMHG | WEIGHT: 105.6 LBS | OXYGEN SATURATION: 95 % | HEART RATE: 108 BPM | HEIGHT: 60 IN | TEMPERATURE: 98.4 F

## 2023-11-06 DIAGNOSIS — I10 BENIGN ESSENTIAL HYPERTENSION: ICD-10-CM

## 2023-11-06 DIAGNOSIS — K21.9 GASTROESOPHAGEAL REFLUX DISEASE WITHOUT ESOPHAGITIS: Primary | ICD-10-CM

## 2023-11-06 PROCEDURE — 99214 OFFICE O/P EST MOD 30 MIN: CPT | Performed by: INTERNAL MEDICINE

## 2023-11-06 RX ORDER — PANTOPRAZOLE SODIUM 40 MG/1
40 TABLET, DELAYED RELEASE ORAL DAILY
Qty: 30 TABLET | Refills: 5 | Status: SHIPPED | OUTPATIENT
Start: 2023-11-06

## 2023-11-06 RX ORDER — LUBIPROSTONE 8 UG/1
8 CAPSULE ORAL 2 TIMES DAILY WITH MEALS
Qty: 60 CAPSULE | Refills: 0 | Status: SHIPPED | OUTPATIENT
Start: 2023-11-06 | End: 2023-11-06

## 2023-11-06 RX ORDER — LUBIPROSTONE 8 UG/1
8 CAPSULE ORAL 2 TIMES DAILY WITH MEALS
Qty: 180 CAPSULE | Refills: 0 | Status: SHIPPED | OUTPATIENT
Start: 2023-11-06

## 2023-11-06 RX ORDER — AMLODIPINE BESYLATE 5 MG/1
5 TABLET ORAL DAILY
Qty: 90 TABLET | OUTPATIENT
Start: 2023-11-06

## 2023-11-06 RX ORDER — VALSARTAN 160 MG/1
160 TABLET ORAL DAILY
Qty: 30 TABLET | Refills: 3 | Status: SHIPPED | OUTPATIENT
Start: 2023-11-06

## 2023-11-06 RX ORDER — AMLODIPINE BESYLATE 10 MG/1
10 TABLET ORAL DAILY
Qty: 90 TABLET | Refills: 1 | Status: SHIPPED | OUTPATIENT
Start: 2023-11-06

## 2023-11-06 NOTE — PROGRESS NOTES
Subjective   Rocío Valadez is a 63 y.o. female.     Chief Complaint   Patient presents with    Hypertension       History of Present Illness   Follow-up for hypertension.  Currently, has been feeling well and asymptomatic without any vision changes, cough, chest pain, shortness of breath, swelling, focal neurologic deficit, memory loss or syncope.  Has been having some headaches.  Has been taking the medications regularly and adherent with the regimen of Maxide 25 daily.  Denies medication side effects and no significant interval events.  Blood pressure has been elevating and up to 200 for the top number at home.  Seen 2 weeks ago and amlodipine 10 mg added.     Follow-up for cholesterol.  Currently, has been feeling well without any myalgias, muscle aches, weakness, numbness, chest pain, short of breath or other issues.  Currently, is adherent with medication regimen of atorvastatin 10 mg and denies medication side effects. Is due for lab follow-up.     History of GERD currently controlled use of the Dexilant 60 mg daily (in past had tried omeprazole 40 mg and nexium 40 mg daily with persistent gastritis by endoscopy with Dr Galeano.     Known arthritic issues utilizing the meloxicam and tolerating well.     History of vitamin D deficiency on vitamin D 50,000 units weekly.     Is being evaluated for lung transplant but the  transplant program is on pause.     Patient with arthritis pain and osteoporosis.  Currently on forteo.  Ana partida developed right greater than left 5th finger with knot on palmar aspect and flexion contracture.  Has a lot of arthritis pain in the hands.      The following portions of the patient's history were reviewed and updated as appropriate: allergies, current medications, past family history, past medical history, past social history, past surgical history and problem list.    Depression Screen:      7/17/2023     3:44 PM   PHQ-2/PHQ-9 Depression Screening   Little Interest or  Pleasure in Doing Things 0-->not at all   Feeling Down, Depressed or Hopeless 0-->not at all   PHQ-9: Brief Depression Severity Measure Score 0       Past Medical History:   Diagnosis Date    Allergic rhinitis     FOLLOWED BY DR. DOMINICK QUIROZ    Anemia 08/2021    Arthritis     Asthma     Atypical chest pain     B12 deficiency 02/16/2022    Solares's esophagus     Benign essential hypertension     Bloating     Carrier of alpha-1-antitrypsin deficiency     Cervical muscle strain 01/08/2020    Chronic idiopathic constipation     Chronic respiratory failure with hypercapnia     Chronic vaginitis     Colon polyps     FOLLOWED BY DR. MARYJO YBARRA    COPD (chronic obstructive pulmonary disease)     SEVERE, CONSULTING FOR LUNG TRANSPLANT    Cough     COVID-19     Cystitis 09/2022    Diverticulitis of colon     Dyspareunia in female     Dysphagia     Early satiety     Environmental allergies     Gallbladder sludge     GERD (gastroesophageal reflux disease)     Greater trochanteric bursitis of left hip 08/2022    Headache     Hemorrhoids     Hiatal hernia     History of transfusion 6/20    Due to colon    Hyperlipidemia     Irritable bowel syndrome     Low back pain     Mucous retention cyst of salivary gland 04/2022    OF TONSIL, OFFERED REMOVAL, PT DECLINE, WATCHFUL FOLLOW UP, SEEN BY DR. HUSSAIN SINCLAIR    Oral thrush 06/2020    Osteoporosis     Oxygen dependent 08/12/2021    PNA (pneumonia) 06/2018    PNA (pneumonia) 08/29/2022    ADMITTED TO [facility]    Positive colorectal cancer screening using Cologuard test 04/2021    Postmenopausal HRT (hormone replacement therapy)     Postoperative urinary retention     Pulmonary nodule, right     Rotator cuff tear, right 06/2020    S/P REPAIR    Throat clearing     Vaginal atrophy     Vitamin D deficiency 02/16/2022       Past Surgical History:   Procedure Laterality Date    ABDOMINAL HYSTERECTOMY N/A 1994    PHIL WITH BSO, fibroid tumors and menorhagia    BREAST SURGERY  Bilateral     enlargement procedure. Saline    BRONCHOSCOPY N/A 12/31/2014    WITH ALVEOLAR LAVAGE    CARDIAC CATHETERIZATION Right 09/01/2022    NORMAL CORONARY ARTERIES, NO EVIDENCE OF OBSTRUCTIVE CAD, DR. ALEXIA PACE AT [facility]    COLONOSCOPY N/A 2008    WNL    COLONOSCOPY W/ POLYPECTOMY N/A 06/03/2021    TORTUOUS COLON, 4 MM POLYP IN HEPATIC FLEXURE, 20 MM POLYP IN RECTOSIGMOID, SIGMOID DIVERTICULA WITH NARROWING, HEMORRHOIDS, RESCOPE IN 3 YRS, DR. MARYJO YBARRA AT [facility]    COLONOSCOPY W/ POLYPECTOMY N/A 03/10/2016    2 BENIGN POLYPS IN RECTOSIGMOID, 1 BENIGN POLYP IN TRANSVERSE, SEVERE DIVERTICULOSIS IN SIGMOID, MODERATE DIVERTICULOSIS THROUGHOUT, RESCOPE IN 5 YRS, DR. MARYJO YBARRA AT [facility]    ENDOSCOPY N/A 03/10/2016    ESOPHAGITIS, Z LINE IRREGULAR, SMALL HIATAL HERNIA, ESOPHAGUS SPASMS, DR. MARYJO YBARRA AT [facility]    ENDOSCOPY N/A 02/09/2021    ESOPHAGEAL STENOSIS DILATED, DR. TASHA YBARRA AT [facility]    SHOULDER ARTHROSCOPY Right 06/17/2020    Procedure: SHOULDER ARTHROSCOPY  ROTATOR CUFF REPAIR ACROMIOPLASTY  PRP AUGMENTATION ;  Surgeon: Isidoro Albrecht MD;  Location: [facility];  Service: Orthopedics;  Laterality: Right;    TUBAL ABDOMINAL LIGATION Bilateral        Family History   Problem Relation Age of Onset    COPD Mother     Diabetes Mother     Hypertension Mother     Heart disease Mother     Arthritis Mother     Heart attack Mother     Aneurysm Father     Hypertension Father     Cancer Brother     Liver cancer Brother     Cirrhosis Brother     Thyroid disease Daughter     Diabetes Maternal Grandmother     Cancer Maternal Grandmother     Lung cancer Maternal Grandmother     Malig Hyperthermia Neg Hx        Social History     Socioeconomic History    Marital status:    Tobacco Use    Smoking status: Former     Packs/day: 1.50     Years: 30.00     Additional pack years: 0.00     Total pack years: 45.00     Types: Cigarettes     Start date: 1/1/1989      Quit date: 1/1/2013     Years since quitting: 10.8     Passive exposure: Past    Smokeless tobacco: Never    Tobacco comments:     QUIT 7 YEARS AGO   Vaping Use    Vaping Use: Never used   Substance and Sexual Activity    Alcohol use: Not Currently     Alcohol/week: 2.0 standard drinks of alcohol     Types: 2 Glasses of wine per week     Comment: OCCASIONALLY     Drug use: Never    Sexual activity: Yes     Partners: Male     Birth control/protection: Post-menopausal, Tubal ligation, Hysterectomy       Current Outpatient Medications   Medication Sig Dispense Refill    amitriptyline (ELAVIL) 25 MG tablet Take 1 tablet by mouth every night at bedtime. 90 tablet 3    amLODIPine (NORVASC) 10 MG tablet Take 1 tablet by mouth Daily. 90 tablet 1    atorvastatin (LIPITOR) 20 MG tablet Take 1 tablet by mouth Daily.      azithromycin (ZITHROMAX) 250 MG tablet 1 tablet every Monday, Wednesday, and Friday      budesonide-formoterol (SYMBICORT) 160-4.5 MCG/ACT inhaler Inhale 2 puffs 2 (Two) Times a Day.      cyanocobalamin 1000 MCG/ML injection Inject 1 mL into the appropriate muscle as directed by prescriber Every 30 (Thirty) Days. 3 mL 3    DALIRESP 500 MCG tablet tablet Take 1 tablet by mouth Daily.      Hydrocortisone, Perianal, (Anusol-HC) 2.5 % rectal cream Apply to hemorrhoids 3 times daily for 7 days during hemorrhoid flare. Include applicator. Use for 7 days, then take a break for 7 days before resuming this pattern. 30 g 1    lubiprostone (AMITIZA) 8 MCG capsule TAKE 1 CAPSULE BY MOUTH TWICE DAILY WITH MEALS 180 capsule 0    mupirocin (BACTROBAN) 2 % ointment APPLY TOPICALLY TO NASAL SORE TWICE DAILY      pantoprazole (Protonix) 40 MG EC tablet Take 1 tablet by mouth Daily. 30 tablet 5    PROAIR  (90 Base) MCG/ACT inhaler Inhale 1 puff Every 4 (Four) Hours As Needed.      Romosozumab-aqqg (Evenity) 105 MG/1.17ML subcutaneous solution Inject 2.34 mL under the skin into the appropriate area as directed Every 30  "(Thirty) Days.      tiotropium (SPIRIVA) 18 MCG per inhalation capsule Place 1 capsule into inhaler and inhale Daily.      tretinoin (RETIN-A) 0.025 % cream Apply 1 application  topically to the appropriate area as directed Every Night.      triamterene-hydrochlorothiazide (MAXZIDE-25) 37.5-25 MG per tablet Take 1 tablet by mouth Daily. 90 tablet 2    vitamin D (ERGOCALCIFEROL) 1.25 MG (05138 UT) capsule capsule TAKE 1 CAPSULE BY MOUTH 1 TIME EVERY WEEK 12 capsule 0    valsartan (DIOVAN) 160 MG tablet Take 1 tablet by mouth Daily. 30 tablet 3     No current facility-administered medications for this visit.       Review of Systems   Constitutional:  Negative for activity change, appetite change, fatigue, fever, unexpected weight gain and unexpected weight loss.   HENT:  Negative for nosebleeds, rhinorrhea, trouble swallowing and voice change.    Eyes:  Negative for visual disturbance.   Respiratory:  Positive for shortness of breath. Negative for cough, chest tightness and wheezing.    Cardiovascular:  Negative for chest pain, palpitations and leg swelling.   Gastrointestinal:  Negative for abdominal pain, blood in stool, constipation, diarrhea, nausea, vomiting, GERD and indigestion.   Genitourinary:  Negative for dysuria, frequency and hematuria.   Musculoskeletal:  Negative for arthralgias, back pain and myalgias.   Skin:  Negative for rash and wound.   Neurological:  Positive for headache. Negative for dizziness, tremors, weakness, light-headedness, numbness and memory problem.   Hematological:  Negative for adenopathy. Does not bruise/bleed easily.   Psychiatric/Behavioral:  Negative for sleep disturbance and depressed mood. The patient is not nervous/anxious.        Objective   /82 (BP Location: Left arm, Patient Position: Sitting, Cuff Size: Adult) Comment: Her machine right arm was 176/89  Pulse 108   Temp 98.4 °F (36.9 °C) (Temporal)   Ht 152.4 cm (60\")   Wt 47.9 kg (105 lb 9.6 oz)   SpO2 95%   " BMI 20.62 kg/m²     Physical Exam  Vitals and nursing note reviewed.   Constitutional:       General: She is not in acute distress.     Appearance: She is well-developed. She is not diaphoretic.   HENT:      Head: Normocephalic and atraumatic.      Right Ear: External ear normal.      Left Ear: External ear normal.      Nose: Nose normal.   Eyes:      Conjunctiva/sclera: Conjunctivae normal.      Pupils: Pupils are equal, round, and reactive to light.   Neck:      Thyroid: No thyromegaly.      Trachea: No tracheal deviation.   Cardiovascular:      Rate and Rhythm: Normal rate and regular rhythm.      Heart sounds: Normal heart sounds. No murmur heard.     No friction rub. No gallop.   Pulmonary:      Effort: Pulmonary effort is normal. No respiratory distress.      Breath sounds: Normal breath sounds.      Comments: Using oxygen per NC  Abdominal:      General: Bowel sounds are normal.      Palpations: Abdomen is soft. There is no mass.      Tenderness: There is no abdominal tenderness. There is no guarding.   Musculoskeletal:         General: Normal range of motion.      Cervical back: Normal range of motion and neck supple.   Lymphadenopathy:      Cervical: No cervical adenopathy.   Skin:     General: Skin is warm and dry.      Capillary Refill: Capillary refill takes less than 2 seconds.      Findings: No rash.   Neurological:      Mental Status: She is alert and oriented to person, place, and time.      Motor: No abnormal muscle tone.      Deep Tendon Reflexes: Reflexes normal.   Psychiatric:         Behavior: Behavior normal.         Thought Content: Thought content normal.         Judgment: Judgment normal.         No results found for this or any previous visit (from the past 2016 hour(s)).  Assessment & Plan   Diagnoses and all orders for this visit:    1. Benign essential hypertension  -     amLODIPine (NORVASC) 10 MG tablet; Take 1 tablet by mouth Daily.  Dispense: 90 tablet; Refill: 1  -     valsartan  (DIOVAN) 160 MG tablet; Take 1 tablet by mouth Daily.  Dispense: 30 tablet; Refill: 3    Hypertension is improved but still not controlled.  We will continue with the amlodipine 10 mg daily along with her Maxide 37 point 5-25 daily but will add valsartan 160 mg daily to her current regimen and have patient follow-up in 3 to 4 weeks to recheck her blood pressure.           COVID-19 Precautions - Patient was compliant in wearing a mask. When I saw the patient, I used appropriate personal protective equipment (PPE) including mask and eye shield (standard procedure).  Additionally, I used gown and gloves if indicated.  Hand hygiene was completed before and after seeing the patient.  Dictated utilizing Dragon Dictation

## 2023-11-07 ENCOUNTER — TELEPHONE (OUTPATIENT)
Dept: FAMILY MEDICINE CLINIC | Facility: CLINIC | Age: 63
End: 2023-11-07
Payer: COMMERCIAL

## 2023-11-07 NOTE — TELEPHONE ENCOUNTER
Patient started her valsartan 11/6/23 last night her B/P dropped to 104/42, her chest felt very heavy like someone was sitting on her.  EMS was called & told her to call her PCP on 11/7/23 this morning her B/P is 124/66 & patient hasn't taken any of her medication.

## 2023-11-07 NOTE — TELEPHONE ENCOUNTER
I called and spoke with seth. I let her know what Dr Calixto said in regards to her medication and blood pressure problems. She relayed it back to me and verbalized understanding and will call back if she needs anything else or has further problems.

## 2023-11-20 DIAGNOSIS — I10 BENIGN ESSENTIAL HYPERTENSION: ICD-10-CM

## 2023-11-20 RX ORDER — AMLODIPINE BESYLATE 5 MG/1
5 TABLET ORAL DAILY
Qty: 90 TABLET | Refills: 3 | Status: SHIPPED | OUTPATIENT
Start: 2023-11-20

## 2023-12-01 RX ORDER — ACETAMINOPHEN 500 MG
1000 TABLET ORAL
COMMUNITY
Start: 2023-11-16

## 2023-12-04 ENCOUNTER — HOSPITAL ENCOUNTER (OUTPATIENT)
Facility: HOSPITAL | Age: 63
Setting detail: HOSPITAL OUTPATIENT SURGERY
Discharge: HOME | End: 2023-12-04
Attending: COLON & RECTAL SURGERY | Admitting: COLON & RECTAL SURGERY
Payer: COMMERCIAL

## 2023-12-04 ENCOUNTER — ANESTHESIA (OUTPATIENT)
Dept: GASTROENTEROLOGY | Facility: HOSPITAL | Age: 63
End: 2023-12-04
Payer: COMMERCIAL

## 2023-12-04 ENCOUNTER — ANESTHESIA EVENT (OUTPATIENT)
Dept: GASTROENTEROLOGY | Facility: HOSPITAL | Age: 63
End: 2023-12-04
Payer: COMMERCIAL

## 2023-12-04 VITALS
DIASTOLIC BLOOD PRESSURE: 78 MMHG | HEIGHT: 60 IN | RESPIRATION RATE: 16 BRPM | OXYGEN SATURATION: 100 % | HEART RATE: 93 BPM | WEIGHT: 105.5 LBS | BODY MASS INDEX: 20.71 KG/M2 | SYSTOLIC BLOOD PRESSURE: 127 MMHG

## 2023-12-04 DIAGNOSIS — K62.5 RECTAL BLEEDING: Primary | ICD-10-CM

## 2023-12-04 PROCEDURE — 25010000002 PROPOFOL 10 MG/ML EMULSION: Performed by: NURSE ANESTHETIST, CERTIFIED REGISTERED

## 2023-12-04 PROCEDURE — 25010000002 KETOROLAC TROMETHAMINE PER 15 MG: Performed by: NURSE ANESTHETIST, CERTIFIED REGISTERED

## 2023-12-04 PROCEDURE — 88305 TISSUE EXAM BY PATHOLOGIST: CPT | Performed by: COLON & RECTAL SURGERY

## 2023-12-04 PROCEDURE — 25810000003 LACTATED RINGERS PER 1000 ML: Performed by: COLON & RECTAL SURGERY

## 2023-12-04 DEVICE — WORKING LENGTH 235CM, WORKING CHANNEL 2.8MM
Type: IMPLANTABLE DEVICE | Site: ASCENDING COLON | Status: FUNCTIONAL
Brand: RESOLUTION 360 CLIP

## 2023-12-04 RX ORDER — PROPOFOL 10 MG/ML
VIAL (ML) INTRAVENOUS AS NEEDED
Status: DISCONTINUED | OUTPATIENT
Start: 2023-12-04 | End: 2023-12-04 | Stop reason: SURG

## 2023-12-04 RX ORDER — LIDOCAINE HYDROCHLORIDE 10 MG/ML
0.5 INJECTION, SOLUTION INFILTRATION; PERINEURAL ONCE AS NEEDED
Status: DISCONTINUED | OUTPATIENT
Start: 2023-12-04 | End: 2023-12-04 | Stop reason: HOSPADM

## 2023-12-04 RX ORDER — SODIUM CHLORIDE 0.9 % (FLUSH) 0.9 %
10 SYRINGE (ML) INJECTION AS NEEDED
Status: DISCONTINUED | OUTPATIENT
Start: 2023-12-04 | End: 2023-12-04 | Stop reason: HOSPADM

## 2023-12-04 RX ORDER — LIDOCAINE HYDROCHLORIDE 20 MG/ML
INJECTION, SOLUTION INFILTRATION; PERINEURAL AS NEEDED
Status: DISCONTINUED | OUTPATIENT
Start: 2023-12-04 | End: 2023-12-04 | Stop reason: SURG

## 2023-12-04 RX ORDER — SODIUM CHLORIDE, SODIUM LACTATE, POTASSIUM CHLORIDE, CALCIUM CHLORIDE 600; 310; 30; 20 MG/100ML; MG/100ML; MG/100ML; MG/100ML
1000 INJECTION, SOLUTION INTRAVENOUS CONTINUOUS
Status: DISCONTINUED | OUTPATIENT
Start: 2023-12-04 | End: 2023-12-04 | Stop reason: HOSPADM

## 2023-12-04 RX ORDER — BUPIVACAINE HYDROCHLORIDE AND EPINEPHRINE 5; 5 MG/ML; UG/ML
INJECTION, SOLUTION EPIDURAL; INTRACAUDAL; PERINEURAL AS NEEDED
Status: DISCONTINUED | OUTPATIENT
Start: 2023-12-04 | End: 2023-12-04 | Stop reason: HOSPADM

## 2023-12-04 RX ORDER — KETOROLAC TROMETHAMINE 30 MG/ML
INJECTION, SOLUTION INTRAMUSCULAR; INTRAVENOUS AS NEEDED
Status: DISCONTINUED | OUTPATIENT
Start: 2023-12-04 | End: 2023-12-04 | Stop reason: SURG

## 2023-12-04 RX ORDER — TRAMADOL HYDROCHLORIDE 50 MG/1
TABLET ORAL
Qty: 20 TABLET | Refills: 0 | Status: SHIPPED | OUTPATIENT
Start: 2023-12-04

## 2023-12-04 RX ADMIN — PROPOFOL 120 MCG/KG/MIN: 10 INJECTION, EMULSION INTRAVENOUS at 12:45

## 2023-12-04 RX ADMIN — SODIUM CHLORIDE, POTASSIUM CHLORIDE, SODIUM LACTATE AND CALCIUM CHLORIDE 1000 ML: 600; 310; 30; 20 INJECTION, SOLUTION INTRAVENOUS at 12:34

## 2023-12-04 RX ADMIN — PROPOFOL 50 MG: 10 INJECTION, EMULSION INTRAVENOUS at 12:46

## 2023-12-04 RX ADMIN — PROPOFOL 50 MG: 10 INJECTION, EMULSION INTRAVENOUS at 12:44

## 2023-12-04 RX ADMIN — KETOROLAC TROMETHAMINE 10 MG: 30 INJECTION, SOLUTION INTRAMUSCULAR at 13:09

## 2023-12-04 RX ADMIN — LIDOCAINE HYDROCHLORIDE 60 MG: 20 INJECTION, SOLUTION INFILTRATION; PERINEURAL at 12:44

## 2023-12-04 NOTE — ANESTHESIA POSTPROCEDURE EVALUATION
Patient: Rocío Valadez    Procedure Summary       Date: 12/04/23 Room / Location: [facility] ENDOSCOPY 1 /  [facility]    Anesthesia Start: 1239 Anesthesia Stop: 1315    Procedures:       HEMORRHOID BANDING x4 (Rectum)      COLONOSCOPY to cecum with hot snare polypectomy and resolution clip placement x1 Diagnosis:       Rectal bleeding      (Rectal bleeding [K62.5])    Surgeons: Davis Castro MD Provider: Titi Solomon MD    Anesthesia Type: MAC ASA Status: 4            Anesthesia Type: MAC    Vitals  Vitals Value Taken Time   /78 12/04/23 1342   Temp     Pulse 93 12/04/23 1345   Resp 16 12/04/23 1342   SpO2 100 % 12/04/23 1345   Vitals shown include unfiled device data.        Post Anesthesia Care and Evaluation    Patient location during evaluation: PACU  Patient participation: complete - patient participated  Level of consciousness: awake and alert  Pain management: adequate    Airway patency: patent  Anesthetic complications: No anesthetic complications    Cardiovascular status: acceptable  Respiratory status: acceptable  Hydration status: acceptable    Comments: --------------------            12/04/23               1342     --------------------   BP:       127/78     Pulse:      93       Resp:       16       SpO2:      100%     --------------------

## 2023-12-04 NOTE — H&P
Rocío Valadez is a 63 y.o. female  who is referred by Davis Castro MD for a colonoscopy. She   has an indications: rectal bleeding.     She denies any change in bowel function, melena, or hematochezia.    Past Medical History:   Diagnosis Date    Allergic rhinitis     FOLLOWED BY DR. DOMINICK QUIROZ    Anemia 08/2021    Anesthesia     ** PT REPORTED SHE IS VERY SENSITIVE TO ANESTHESIA    Arthritis     Asthma     Atypical chest pain     B12 deficiency 02/16/2022    Solares's esophagus     Benign essential hypertension     Bloating     Carrier of alpha-1-antitrypsin deficiency     Cervical muscle strain 01/08/2020    Chronic idiopathic constipation     Chronic respiratory failure with hypercapnia     Chronic vaginitis     Colon polyps     FOLLOWED BY DR. MARYJO YBARRA    COPD (chronic obstructive pulmonary disease)     SEVERE, ON LUNG TRANSPLANT LIST    Cough     COVID-19     Cystitis 09/2022    Diverticulitis of colon     Dyspareunia in female     Dysphagia     Early satiety     Environmental allergies     Gallbladder sludge     GERD (gastroesophageal reflux disease)     Greater trochanteric bursitis of left hip 08/2022    Headache     Hemorrhoids     Hiatal hernia     History of transfusion 6/20    Due to colon    Hyperlipidemia     Irritable bowel syndrome     Low back pain     Mucous retention cyst of salivary gland 04/2022    OF TONSIL, OFFERED REMOVAL, PT DECLINE, WATCHFUL FOLLOW UP, SEEN BY DR. HUSSAIN SINCLAIR    Oral thrush 06/2020    Osteoporosis     Oxygen dependent 08/12/2021    PNA (pneumonia) 06/2018    PNA (pneumonia) 08/29/2022    ADMITTED TO [facility]    Positive colorectal cancer screening using Cologuard test 04/2021    Postmenopausal HRT (hormone replacement therapy)     Postoperative urinary retention     Pulmonary nodule, right     Rotator cuff tear, right 06/2020    S/P REPAIR    Throat clearing     Vaginal atrophy     Vitamin D deficiency 02/16/2022       Past Surgical History:   Procedure  Laterality Date    ABDOMINAL HYSTERECTOMY N/A 1994    PHIL WITH BSO, fibroid tumors and menorhagia    BREAST SURGERY Bilateral     enlargement procedure. [facility]ine    BRONCHOSCOPY N/A 12/31/2014    WITH ALVEOLAR LAVAGE    CARDIAC CATHETERIZATION Right 09/01/2022    NORMAL CORONARY ARTERIES, NO EVIDENCE OF OBSTRUCTIVE CAD, DR. ALEXIA PACE AT [facility]    COLONOSCOPY N/A 2008    WNL    COLONOSCOPY W/ POLYPECTOMY N/A 06/03/2021    TORTUOUS COLON, 4 MM POLYP IN HEPATIC FLEXURE, 20 MM POLYP IN RECTOSIGMOID, SIGMOID DIVERTICULA WITH NARROWING, HEMORRHOIDS, RESCOPE IN 3 YRS, DR. MARYJO YBARRA AT [facility]    COLONOSCOPY W/ POLYPECTOMY N/A 03/10/2016    2 BENIGN POLYPS IN RECTOSIGMOID, 1 BENIGN POLYP IN TRANSVERSE, SEVERE DIVERTICULOSIS IN SIGMOID, MODERATE DIVERTICULOSIS THROUGHOUT, RESCOPE IN 5 YRS, DR. MARYJO YBARRA AT [facility]    ENDOSCOPY N/A 03/10/2016    ESOPHAGITIS, Z LINE IRREGULAR, SMALL HIATAL HERNIA, ESOPHAGUS SPASMS, DR. MARYJO YBARRA AT [facility]    ENDOSCOPY N/A 02/09/2021    ESOPHAGEAL STENOSIS DILATED, DR. TASHA YBARRA AT [facility]    SHOULDER ARTHROSCOPY Right 06/17/2020    Procedure: SHOULDER ARTHROSCOPY  ROTATOR CUFF REPAIR ACROMIOPLASTY  PRP AUGMENTATION ;  Surgeon: Isidoro Albrecht MD;  [place] [facility] [place] [facility]  Service: Orthopedics;  Laterality: Right;    TUBAL ABDOMINAL LIGATION Bilateral        No medications prior to admission.       Allergies   Allergen Reactions    Bee Venom Hives and Swelling    Shellfish-Derived Products Swelling    Doxycycline Nausea And Vomiting    Erythromycin GI Intolerance     vomiting      Lisinopril Hives and Rash    Amitraz Unknown (See Comments)    Amoxicillin-Pot Clavulanate GI Intolerance    Contrast Dye (Echo Or Unknown Ct/Mr) GI Intolerance    Iodine GI Intolerance       Family History   Problem Relation Age of Onset    COPD Mother     Diabetes Mother     Hypertension Mother     Heart disease Mother     Arthritis Mother     Heart attack Mother      Aneurysm Father     Hypertension Father     Cancer Brother     Liver cancer Brother     Cirrhosis Brother     Thyroid disease Daughter     Diabetes Maternal Grandmother     Cancer Maternal Grandmother     Lung cancer Maternal Grandmother     Malig Hyperthermia Neg Hx        Social History     Socioeconomic History    Marital status:    Tobacco Use    Smoking status: Former     Packs/day: 1.50     Years: 30.00     Additional pack years: 0.00     Total pack years: 45.00     Types: Cigarettes     Start date: 1/1/1989     Quit date: 1/1/2013     Years since quitting: 10.9     Passive exposure: Past    Smokeless tobacco: Never    Tobacco comments:     QUIT 7 YEARS AGO   Vaping Use    Vaping Use: Never used   Substance and Sexual Activity    Alcohol use: Not Currently     Alcohol/week: 2.0 standard drinks of alcohol     Types: 2 Glasses of wine per week     Comment: OCCASIONALLY     Drug use: Never    Sexual activity: Yes     Partners: Male     Birth control/protection: Post-menopausal, Tubal ligation, Hysterectomy       Review of Systems   Gastrointestinal:  Negative for abdominal pain, nausea and vomiting.   All other systems reviewed and are negative.      Vitals:    12/04/23 1342   BP: 127/78   Pulse: 93   Resp: 16   SpO2: 100%         Physical Exam  Exam conducted with a chaperone present.   Constitutional:       General: She is not in acute distress.     Appearance: She is well-developed.   HENT:      Head: Normocephalic and atraumatic.      Nose: Nose normal.   Eyes:      Conjunctiva/sclera: Conjunctivae normal.      Pupils: Pupils are equal, round, and reactive to light.   Neck:      Trachea: No tracheal deviation.   Pulmonary:      Effort: Pulmonary effort is normal. No respiratory distress.      Breath sounds: Normal breath sounds.   Abdominal:      General: Bowel sounds are normal. There is no distension.      Palpations: Abdomen is soft.   Musculoskeletal:         General: No deformity. Normal range  of motion.      Cervical back: Normal range of motion.   Skin:     General: Skin is warm and dry.   Neurological:      Mental Status: She is alert and oriented to person, place, and time.      Cranial Nerves: No cranial nerve deficit.      Coordination: Coordination normal.      Gait: Gait normal.   Psychiatric:         Behavior: Behavior normal.         Judgment: Judgment normal.           Assessment & Plan      indications: rectal bleeding         I recommend colonoscopy and possible rbl.  I described risks, benefits of the procedure with the patient including but not limited to bleeding, infection, possibility of perforation and possible polypectomy. All of the patient's questions were answered and they would like to proceed with the above recommendations.

## 2023-12-04 NOTE — DISCHARGE INSTRUCTIONS
For the next 24 hours patient needs to be with a responsible adult.    For 24 hours DO NOT drive, operate machinery, appliances, drink alcohol, make important decisions or sign legal documents.    Start with a light or bland diet if you are feeling sick to your stomach otherwise advance to regular diet as tolerated.    Follow recommendations on procedure report if provided by your doctor.    Call Dr Castro for problems [phone]    Problems may include but not limited to: large amounts of bleeding, trouble breathing, repeated vomiting, severe unrelieved pain, fever or chills.      A METAL RESOLUTION CLIP WAS PLACED IN YOUR COLON WHERE POLYP WAS REMOVED, TO PREVENT BLEEDING. IT WILL FALL OFF AND PASS.            DIET:    We recommend a high fiber diet to keep your stool soft and to prevent constipation.  Eat plenty of fruits and vegetables.  Drink 8-10 glasses of water or juice every day.  Eat whole grain cereals and breads.  Salads with raw vegetables are also a good source of fiber.    STOOL :    Metamucil, Citrucil, Konsyl, Fibercon, Benefiber or Miralax.  Take ____ tablespoon(s)/teaspoon(s) in a glass of water or juice _____ time(s) per day.      PAIN CONTROL:    Sit in a warm tub of water to relieve minor discomfort.  Use Tylenol or other non-aspirin medication.  Do not take aspirin for 10 days following procedure unless ordered by your physician.  Avoid the use of narcotics, such as codeine, because they may cause constipation.    COMPLICATIONS:    1. A small amount of bright red bleeding can be expected.  If bleeding persists or if it is     greater than 1 cup full of blood, call your doctor.    2.  If you have severe pain, fever (greater that 101) or if you are unable to urinate within 6 hours following hemorrhoidal treatment, call your doctor.

## 2023-12-04 NOTE — ANESTHESIA PREPROCEDURE EVALUATION
Anesthesia Evaluation     Patient summary reviewed and Nursing notes reviewed                Airway   Mallampati: II  TM distance: >3 FB  Neck ROM: full  Dental      Pulmonary - negative pulmonary ROS   (+) a smoker Former, COPD severe, asthma,home oxygen  Cardiovascular - negative cardio ROS    Rhythm: regular  Rate: normal    (+) hypertension, hyperlipidemia      Neuro/Psych- negative ROS  GI/Hepatic/Renal/Endo - negative ROS   (+) hiatal hernia, GERD, GI bleeding     Musculoskeletal (-) negative ROS    Abdominal    Substance History - negative use     OB/GYN negative ob/gyn ROS         Other                    Anesthesia Plan    ASA 4     MAC     (Home oxygen for severe COPD  Does not currently smoke)  intravenous induction     Anesthetic plan, risks, benefits, and alternatives have been provided, discussed and informed consent has been obtained with: patient.    Plan discussed with CRNA.    CODE STATUS:

## 2023-12-05 ENCOUNTER — HOSPITAL ENCOUNTER (EMERGENCY)
Facility: HOSPITAL | Age: 63
Discharge: HOME | End: 2023-12-05
Attending: EMERGENCY MEDICINE
Payer: COMMERCIAL

## 2023-12-05 VITALS
BODY MASS INDEX: 20.62 KG/M2 | WEIGHT: 105 LBS | HEART RATE: 89 BPM | DIASTOLIC BLOOD PRESSURE: 77 MMHG | HEIGHT: 60 IN | TEMPERATURE: 97.1 F | SYSTOLIC BLOOD PRESSURE: 146 MMHG | RESPIRATION RATE: 20 BRPM | OXYGEN SATURATION: 100 %

## 2023-12-05 DIAGNOSIS — R33.8 ACUTE URINARY RETENTION: Primary | ICD-10-CM

## 2023-12-05 LAB
ALBUMIN SERPL-MCNC: 4.7 G/DL (ref 3.5–5.2)
ALBUMIN/GLOB SERPL: 2.2 G/DL
ALP SERPL-CCNC: 87 U/L (ref 39–117)
ALT SERPL W P-5'-P-CCNC: 17 U/L (ref 1–33)
ANION GAP SERPL CALCULATED.3IONS-SCNC: 9 MMOL/L (ref 5–15)
AST SERPL-CCNC: 16 U/L (ref 1–32)
BASOPHILS # BLD AUTO: 0.02 10*3/MM3 (ref 0–0.2)
BASOPHILS NFR BLD AUTO: 0.3 % (ref 0–1.5)
BILIRUB SERPL-MCNC: 0.2 MG/DL (ref 0–1.2)
BILIRUB UR QL STRIP: NEGATIVE
BUN SERPL-MCNC: 17 MG/DL (ref 8–23)
BUN/CREAT SERPL: 19.3 (ref 7–25)
CALCIUM SPEC-SCNC: 9.7 MG/DL (ref 8.6–10.5)
CHLORIDE SERPL-SCNC: 97 MMOL/L (ref 98–107)
CLARITY UR: CLEAR
CO2 SERPL-SCNC: 31 MMOL/L (ref 22–29)
COLOR UR: YELLOW
CREAT SERPL-MCNC: 0.88 MG/DL (ref 0.57–1)
DEPRECATED RDW RBC AUTO: 40.1 FL (ref 37–54)
EGFRCR SERPLBLD CKD-EPI 2021: 73.5 ML/MIN/1.73
EOSINOPHIL # BLD AUTO: 0.13 10*3/MM3 (ref 0–0.4)
EOSINOPHIL NFR BLD AUTO: 2 % (ref 0.3–6.2)
ERYTHROCYTE [DISTWIDTH] IN BLOOD BY AUTOMATED COUNT: 13 % (ref 12.3–15.4)
GLOBULIN UR ELPH-MCNC: 2.1 GM/DL
GLUCOSE SERPL-MCNC: 113 MG/DL (ref 65–99)
GLUCOSE UR STRIP-MCNC: ABNORMAL MG/DL
HCT VFR BLD AUTO: 34.5 % (ref 34–46.6)
HGB BLD-MCNC: 11.3 G/DL (ref 12–15.9)
HGB UR QL STRIP.AUTO: NEGATIVE
IMM GRANULOCYTES # BLD AUTO: 0.03 10*3/MM3 (ref 0–0.05)
IMM GRANULOCYTES NFR BLD AUTO: 0.5 % (ref 0–0.5)
KETONES UR QL STRIP: NEGATIVE
LAB AP CASE REPORT: NORMAL
LEUKOCYTE ESTERASE UR QL STRIP.AUTO: NEGATIVE
LYMPHOCYTES # BLD AUTO: 1.28 10*3/MM3 (ref 0.7–3.1)
LYMPHOCYTES NFR BLD AUTO: 20.1 % (ref 19.6–45.3)
MCH RBC QN AUTO: 27.8 PG (ref 26.6–33)
MCHC RBC AUTO-ENTMCNC: 32.8 G/DL (ref 31.5–35.7)
MCV RBC AUTO: 85 FL (ref 79–97)
MONOCYTES # BLD AUTO: 0.76 10*3/MM3 (ref 0.1–0.9)
MONOCYTES NFR BLD AUTO: 11.9 % (ref 5–12)
NEUTROPHILS NFR BLD AUTO: 4.14 10*3/MM3 (ref 1.7–7)
NEUTROPHILS NFR BLD AUTO: 65.2 % (ref 42.7–76)
NITRITE UR QL STRIP: NEGATIVE
NRBC BLD AUTO-RTO: 0 /100 WBC (ref 0–0.2)
PATH REPORT.FINAL DX SPEC: NORMAL
PATH REPORT.GROSS SPEC: NORMAL
PH UR STRIP.AUTO: 5.5 [PH] (ref 5–8)
PLATELET # BLD AUTO: 324 10*3/MM3 (ref 140–450)
PMV BLD AUTO: 10 FL (ref 6–12)
POTASSIUM SERPL-SCNC: 4.1 MMOL/L (ref 3.5–5.2)
PROT SERPL-MCNC: 6.8 G/DL (ref 6–8.5)
PROT UR QL STRIP: NEGATIVE
RBC # BLD AUTO: 4.06 10*6/MM3 (ref 3.77–5.28)
SODIUM SERPL-SCNC: 137 MMOL/L (ref 136–145)
SP GR UR STRIP: 1.02 (ref 1–1.03)
UROBILINOGEN UR QL STRIP: ABNORMAL
WBC NRBC COR # BLD AUTO: 6.36 10*3/MM3 (ref 3.4–10.8)

## 2023-12-05 PROCEDURE — 80053 COMPREHEN METABOLIC PANEL: CPT | Performed by: NURSE PRACTITIONER

## 2023-12-05 PROCEDURE — 51798 US URINE CAPACITY MEASURE: CPT

## 2023-12-05 PROCEDURE — 36415 COLL VENOUS BLD VENIPUNCTURE: CPT

## 2023-12-05 PROCEDURE — 51702 INSERT TEMP BLADDER CATH: CPT

## 2023-12-05 PROCEDURE — 81003 URINALYSIS AUTO W/O SCOPE: CPT | Performed by: NURSE PRACTITIONER

## 2023-12-05 PROCEDURE — 99283 EMERGENCY DEPT VISIT LOW MDM: CPT

## 2023-12-05 PROCEDURE — 85025 COMPLETE CBC W/AUTO DIFF WBC: CPT | Performed by: NURSE PRACTITIONER

## 2023-12-05 NOTE — ED PROVIDER NOTES
MD ATTESTATION NOTE    The JUDI and I have discussed this patient's history, physical exam, and treatment plan.    I provided a substantive portion of the care of this patient. I personally performed the physical exam, in its entirety. The attached note describes my personal findings.      Rocío Valadez is a 63 y.o. female who presents to the ED c/o urine retention.  This has happened to her before.  She states that she had a colonoscopy yesterday and has been having difficulty urinating today.  She reports having suprapubic bladder fullness.      On exam:  GENERAL: not distressed  HENT: nares patent  EYES: no scleral icterus  CV: regular rhythm, regular rate  RESPIRATORY: normal effort  ABDOMEN: soft, subpubic tenderness on exam  MUSCULOSKELETAL: no deformity  NEURO: alert, moves all extremities, follows commands  SKIN: warm, dry    Labs  Recent Results (from the past 24 hour(s))   Comprehensive Metabolic Panel    Collection Time: 12/05/23  7:39 AM    Specimen: Blood   Result Value Ref Range    Glucose 113 (H) 65 - 99 mg/dL    BUN 17 8 - 23 mg/dL    Creatinine 0.88 0.57 - 1.00 mg/dL    Sodium 137 136 - 145 mmol/L    Potassium 4.1 3.5 - 5.2 mmol/L    Chloride 97 (L) 98 - 107 mmol/L    CO2 31.0 (H) 22.0 - 29.0 mmol/L    Calcium 9.7 8.6 - 10.5 mg/dL    Total Protein 6.8 6.0 - 8.5 g/dL    Albumin 4.7 3.5 - 5.2 g/dL    ALT (SGPT) 17 1 - 33 U/L    AST (SGOT) 16 1 - 32 U/L    Alkaline Phosphatase 87 39 - 117 U/L    Total Bilirubin 0.2 0.0 - 1.2 mg/dL    Globulin 2.1 gm/dL    A/G Ratio 2.2 g/dL    BUN/Creatinine Ratio 19.3 7.0 - 25.0    Anion Gap 9.0 5.0 - 15.0 mmol/L    eGFR 73.5 >60.0 mL/min/1.73   CBC Auto Differential    Collection Time: 12/05/23  7:39 AM    Specimen: Blood   Result Value Ref Range    WBC 6.36 3.40 - 10.80 10*3/mm3    RBC 4.06 3.77 - 5.28 10*6/mm3    Hemoglobin 11.3 (L) 12.0 - 15.9 g/dL    Hematocrit 34.5 34.0 - 46.6 %    MCV 85.0 79.0 - 97.0 fL    MCH 27.8 26.6 - 33.0 pg    MCHC 32.8 31.5 - 35.7 g/dL     RDW 13.0 12.3 - 15.4 %    RDW-SD 40.1 37.0 - 54.0 fl    MPV 10.0 6.0 - 12.0 fL    Platelets 324 140 - 450 10*3/mm3    Neutrophil % 65.2 42.7 - 76.0 %    Lymphocyte % 20.1 19.6 - 45.3 %    Monocyte % 11.9 5.0 - 12.0 %    Eosinophil % 2.0 0.3 - 6.2 %    Basophil % 0.3 0.0 - 1.5 %    Immature Grans % 0.5 0.0 - 0.5 %    Neutrophils, Absolute 4.14 1.70 - 7.00 10*3/mm3    Lymphocytes, Absolute 1.28 0.70 - 3.10 10*3/mm3    Monocytes, Absolute 0.76 0.10 - 0.90 10*3/mm3    Eosinophils, Absolute 0.13 0.00 - 0.40 10*3/mm3    Basophils, Absolute 0.02 0.00 - 0.20 10*3/mm3    Immature Grans, Absolute 0.03 0.00 - 0.05 10*3/mm3    nRBC 0.0 0.0 - 0.2 /100 WBC   Urinalysis With Culture If Indicated - Indwelling Urethral Catheter    Collection Time: 12/05/23  7:40 AM    Specimen: Indwelling Urethral Catheter; Urine   Result Value Ref Range    Color, UA Yellow Yellow, Straw    Appearance, UA Clear Clear    pH, UA 5.5 5.0 - 8.0    Specific Gravity, UA 1.021 1.005 - 1.030    Glucose,  mg/dL (1+) (A) Negative    Ketones, UA Negative Negative    Bilirubin, UA Negative Negative    Blood, UA Negative Negative    Protein, UA Negative Negative    Leuk Esterase, UA Negative Negative    Nitrite, UA Negative Negative    Urobilinogen, UA 0.2 E.U./dL 0.2 - 1.0 E.U./dL       Radiology  No Radiology Exams Resulted Within Past 24 Hours    Medications given in the ED:  Medications - No data to display    Orders placed during this visit:  Orders Placed This Encounter   Procedures    Comprehensive Metabolic Panel    Urinalysis With Culture If Indicated - Urine, Catheter    CBC Auto Differential    Bladder scan    Insert Indwelling Urinary Catheter    CBC & Differential       Medical Decision Making:  ED Course as of 12/05/23 1555   Tue Dec 05, 2023   0842 Updated patient on ED workup, including labs and imaging (if performed). We discussed follow up instructions and return precautions. The patient verbalizes understanding and is ready for  discharge. I also instructed the patient to follow up with urology.  [JG]      ED Course User Index  [JG] Padmini Nielsen APRN         I performed a bedside ultrasound.  She had approximate 500 cc of urine in her bladder.    Diagnosis  Final diagnoses:   Acute urinary retention          Roberto Carlos Ashley II, MD  12/05/23 5599

## 2023-12-05 NOTE — ED PROVIDER NOTES
" EMERGENCY DEPARTMENT ENCOUNTER    Room Number:  08/08  Date seen:  12/5/2023  PCP: Adolfo Calixto MD  Historian/Independent historian: n/a  Chronic or social conditions impacting care: n/a      HPI:  Chief Complaint: urinary retention   A complete HPI/ROS/PMH/PSH/SH/FH are unobtainable due to: n/a  Context: Rocío Valadez is a 63 y.o. female who presents to the ED c/o urinary retention.  Patient states that she had a colonoscopy yesterday and has been having difficulty urinating since that time.  She states that she has a history of her \"bladder not waking up\" after anesthesia.  She states that she had a trace amount of urine yesterday afternoon, but has been unable to void since then.  She is complaining of suprapubic abdominal pain/distention.  No fever, nausea, vomiting.    External Medical record review:   12/4/2023: Hemorrhoid banding      PAST MEDICAL HISTORY  Active Ambulatory Problems     Diagnosis Date Noted    COPD with hypoxia 07/09/2018    H/O: hysterectomy 07/09/2018    Postmenopause 07/09/2018    Visit for screening mammogram     Visit for routine gyn exam     Vaginal atrophy     Need for influenza vaccination     Menopausal symptoms     Hyperlipidemia     History of hiatal hernia     History of colon polyps     Early satiety     COPD (chronic obstructive pulmonary disease)     Benign essential hypertension     Allergic rhinitis     Acid reflux     COPD with acute exacerbation 01/08/2020    Cervical muscle strain 01/08/2020    Oxygen dependent 08/12/2021    Adenomatous polyp of colon 06/25/2021    Arthritis 05/18/2021    Chronic idiopathic constipation 12/16/2020    Status post right and left heart catheterization (LHC) 01/25/2019    Vitamin D deficiency 02/16/2022    B12 deficiency 02/16/2022    Osteoporosis 01/26/2023    Arthralgia of both hands 07/17/2023    Rectal bleeding 09/12/2023     Resolved Ambulatory Problems     Diagnosis Date Noted    Nausea     Gallbladder sludge     Epigastric " abdominal pain     Elevated blood sugar level     Dysphagia     Dyspareunia in female     Atypical chest pain     Abdominal pain, RUQ (right upper quadrant)      Past Medical History:   Diagnosis Date    Anemia 08/2021    Anesthesia     Asthma     Solares's esophagus     Bloating     Carrier of alpha-1-antitrypsin deficiency     Chronic respiratory failure with hypercapnia     Chronic vaginitis     Colon polyps     Cough     COVID-19     Cystitis 09/2022    Diverticulitis of colon     Environmental allergies     GERD (gastroesophageal reflux disease)     Greater trochanteric bursitis of left hip 08/2022    Headache     Hemorrhoids     Hiatal hernia     History of transfusion 6/20    Irritable bowel syndrome     Low back pain     Mucous retention cyst of salivary gland 04/2022    Oral thrush 06/2020    PNA (pneumonia) 06/2018    PNA (pneumonia) 08/29/2022    Positive colorectal cancer screening using Cologuard test 04/2021    Postmenopausal HRT (hormone replacement therapy)     Postoperative urinary retention     Pulmonary nodule, right     Rotator cuff tear, right 06/2020    Throat clearing          PAST SURGICAL HISTORY  Past Surgical History:   Procedure Laterality Date    ABDOMINAL HYSTERECTOMY N/A 1994    PHIL WITH BSO, fibroid tumors and menorhagia    BREAST SURGERY Bilateral     enlargement procedure. [facility]ine    BRONCHOSCOPY N/A 12/31/2014    WITH ALVEOLAR LAVAGE    CARDIAC CATHETERIZATION Right 09/01/2022    NORMAL CORONARY ARTERIES, NO EVIDENCE OF OBSTRUCTIVE CAD, DR. ALEXIA PACE AT [facility]     COLONOSCOPY N/A 2008    WNL    COLONOSCOPY N/A 12/4/2023    Procedure: COLONOSCOPY to cecum with hot snare polypectomy and resolution clip placement x1;  Surgeon: Davis Castro MD;  Location: [facility] ENDOSCOPY;  Service: General;  Laterality: N/A;  Pre: rectal bleeding, hx polyps  Post: polyp    COLONOSCOPY W/ POLYPECTOMY N/A 06/03/2021    TORTUOUS COLON, 4 MM POLYP IN HEPATIC FLEXURE, 20 MM POLYP IN RECTOSIGMOID,  SIGMOID DIVERTICULA WITH NARROWING, HEMORRHOIDS, RESCOPE IN 3 YRS, DR. MARYJO YBARRA AT [facility]    COLONOSCOPY W/ POLYPECTOMY N/A 03/10/2016    2 BENIGN POLYPS IN RECTOSIGMOID, 1 BENIGN POLYP IN TRANSVERSE, SEVERE DIVERTICULOSIS IN SIGMOID, MODERATE DIVERTICULOSIS THROUGHOUT, RESCOPE IN 5 YRS, DR. MARYJO YBARRA AT [facility]    ENDOSCOPY N/A 03/10/2016    ESOPHAGITIS, Z LINE IRREGULAR, SMALL HIATAL HERNIA, ESOPHAGUS SPASMS, DR. MARYJO YBARRA AT [facility]    ENDOSCOPY N/A 02/09/2021    ESOPHAGEAL STENOSIS DILATED, DR. TASHA YBARRA AT [facility]    HEMORRHOIDECTOMY N/A 12/4/2023    Procedure: HEMORRHOID BANDING x4;  Surgeon: Davis Castro MD;  Location: [facility] ENDOSCOPY;  Service: General;  Laterality: N/A;  hemorrhoid bands    SHOULDER ARTHROSCOPY Right 06/17/2020    Procedure: SHOULDER ARTHROSCOPY  ROTATOR CUFF REPAIR ACROMIOPLASTY  PRP AUGMENTATION ;  Surgeon: Isidoro Albrecht MD;  Location: [facility] OR [facility];  Service: Orthopedics;  Laterality: Right;    TUBAL ABDOMINAL LIGATION Bilateral          FAMILY HISTORY  Family History   Problem Relation Age of Onset    COPD Mother     Diabetes Mother     Hypertension Mother     Heart disease Mother     Arthritis Mother     Heart attack Mother     Aneurysm Father     Hypertension Father     Cancer Brother     Liver cancer Brother     Cirrhosis Brother     Thyroid disease Daughter     Diabetes Maternal Grandmother     Cancer Maternal Grandmother     Lung cancer Maternal Grandmother     Malig Hyperthermia Neg Hx          SOCIAL HISTORY  Social History     Socioeconomic History    Marital status:    Tobacco Use    Smoking status: Former     Packs/day: 1.50     Years: 30.00     Additional pack years: 0.00     Total pack years: 45.00     Types: Cigarettes     Start date: 1/1/1989     Quit date: 1/1/2013     Years since quitting: 10.9     Passive exposure: Past    Smokeless tobacco: Never    Tobacco comments:     QUIT 7 YEARS AGO   Vaping Use     Vaping Use: Never used   Substance and Sexual Activity    Alcohol use: Not Currently     Alcohol/week: 2.0 standard drinks of alcohol     Types: 2 Glasses of wine per week     Comment: OCCASIONALLY     Drug use: Never    Sexual activity: Yes     Partners: Male     Birth control/protection: Post-menopausal, Tubal ligation, Hysterectomy         ALLERGIES  Bee venom, Shellfish-derived products, Doxycycline, Erythromycin, Lisinopril, Amitraz, Amoxicillin-pot clavulanate, Contrast dye (echo or unknown ct/mr), and Iodine        REVIEW OF SYSTEMS  Per HPI, otherwise negative.       PHYSICAL EXAM  ED Triage Vitals   Temp Heart Rate Resp BP SpO2   12/05/23 0658 12/05/23 0658 12/05/23 0658 12/05/23 0700 12/05/23 0658   97.1 °F (36.2 °C) 96 20 138/79 95 %      Temp src Heart Rate Source Patient Position BP Location FiO2 (%)   12/05/23 0658 12/05/23 0658 -- -- --   Tympanic Monitor          Physical Exam  Vitals and nursing note reviewed.   Constitutional:       Appearance: Normal appearance.   HENT:      Mouth/Throat:      Mouth: Mucous membranes are moist.   Eyes:      Conjunctiva/sclera: Conjunctivae normal.   Cardiovascular:      Rate and Rhythm: Normal rate and regular rhythm.      Pulses: Normal pulses.      Heart sounds: Normal heart sounds.   Pulmonary:      Effort: Pulmonary effort is normal.      Breath sounds: Normal breath sounds. No wheezing or rhonchi.      Comments: On home O2  Abdominal:      General: Bowel sounds are normal. There is distension.      Palpations: Abdomen is soft.      Tenderness: There is abdominal tenderness (suprapubic).   Skin:     General: Skin is warm.      Capillary Refill: Capillary refill takes less than 2 seconds.   Neurological:      Mental Status: She is alert and oriented to person, place, and time. Mental status is at baseline.   Psychiatric:         Mood and Affect: Mood normal.               LAB RESULTS  Recent Results (from the past 24 hour(s))   Tissue Pathology Exam     Collection Time: 12/04/23 12:56 PM    Specimen: Large Intestine, Right / Ascending Colon; Polyp   Result Value Ref Range    Case Report       Surgical Pathology Report                         Case: [id number]                                  Authorizing Provider:  Davis Castro MD        Collected:           12/04/2023 12:56 PM          Ordering Location:     [facility]  Received:            12/04/2023 02:01 PM                                 ENDO SUITES                                                                  Pathologist:           Lee Cordero MD                                                         Specimen:    Large Intestine, Right / Ascending Colon, ascending polyp                                  Final Diagnosis       1.  Colon, right ascending, biopsy: Tubular adenoma      Gross Description       1. Large Intestine, Right / Ascending Colon.  The specimen is received in formalin labeled with the patient's name and further designated 'ascending colon polyp' are multiple small fragments of gray-tan tissue. The specimen is submitted for embedding as received.         Comprehensive Metabolic Panel    Collection Time: 12/05/23  7:39 AM    Specimen: Blood   Result Value Ref Range    Glucose 113 (H) 65 - 99 mg/dL    BUN 17 8 - 23 mg/dL    Creatinine 0.88 0.57 - 1.00 mg/dL    Sodium 137 136 - 145 mmol/L    Potassium 4.1 3.5 - 5.2 mmol/L    Chloride 97 (L) 98 - 107 mmol/L    CO2 31.0 (H) 22.0 - 29.0 mmol/L    Calcium 9.7 8.6 - 10.5 mg/dL    Total Protein 6.8 6.0 - 8.5 g/dL    Albumin 4.7 3.5 - 5.2 g/dL    ALT (SGPT) 17 1 - 33 U/L    AST (SGOT) 16 1 - 32 U/L    Alkaline Phosphatase 87 39 - 117 U/L    Total Bilirubin 0.2 0.0 - 1.2 mg/dL    Globulin 2.1 gm/dL    A/G Ratio 2.2 g/dL    BUN/Creatinine Ratio 19.3 7.0 - 25.0    Anion Gap 9.0 5.0 - 15.0 mmol/L    eGFR 73.5 >60.0 mL/min/1.73   CBC Auto Differential    Collection Time: 12/05/23  7:39 AM    Specimen: Blood   Result Value Ref Range     WBC 6.36 3.40 - 10.80 10*3/mm3    RBC 4.06 3.77 - 5.28 10*6/mm3    Hemoglobin 11.3 (L) 12.0 - 15.9 g/dL    Hematocrit 34.5 34.0 - 46.6 %    MCV 85.0 79.0 - 97.0 fL    MCH 27.8 26.6 - 33.0 pg    MCHC 32.8 31.5 - 35.7 g/dL    RDW 13.0 12.3 - 15.4 %    RDW-SD 40.1 37.0 - 54.0 fl    MPV 10.0 6.0 - 12.0 fL    Platelets 324 140 - 450 10*3/mm3    Neutrophil % 65.2 42.7 - 76.0 %    Lymphocyte % 20.1 19.6 - 45.3 %    Monocyte % 11.9 5.0 - 12.0 %    Eosinophil % 2.0 0.3 - 6.2 %    Basophil % 0.3 0.0 - 1.5 %    Immature Grans % 0.5 0.0 - 0.5 %    Neutrophils, Absolute 4.14 1.70 - 7.00 10*3/mm3    Lymphocytes, Absolute 1.28 0.70 - 3.10 10*3/mm3    Monocytes, Absolute 0.76 0.10 - 0.90 10*3/mm3    Eosinophils, Absolute 0.13 0.00 - 0.40 10*3/mm3    Basophils, Absolute 0.02 0.00 - 0.20 10*3/mm3    Immature Grans, Absolute 0.03 0.00 - 0.05 10*3/mm3    nRBC 0.0 0.0 - 0.2 /100 WBC   Urinalysis With Culture If Indicated - Indwelling Urethral Catheter    Collection Time: 12/05/23  7:40 AM    Specimen: Indwelling Urethral Catheter; Urine   Result Value Ref Range    Color, UA Yellow Yellow, Straw    Appearance, UA Clear Clear    pH, UA 5.5 5.0 - 8.0    Specific Gravity, UA 1.021 1.005 - 1.030    Glucose,  mg/dL (1+) (A) Negative    Ketones, UA Negative Negative    Bilirubin, UA Negative Negative    Blood, UA Negative Negative    Protein, UA Negative Negative    Leuk Esterase, UA Negative Negative    Nitrite, UA Negative Negative    Urobilinogen, UA 0.2 E.U./dL 0.2 - 1.0 E.U./dL       Ordered the above labs and reviewed the results.        RADIOLOGY  No Radiology Exams Resulted Within Past 24 Hours    Ordered the above noted radiological studies. Reviewed by me in PACS.        MEDICATIONS GIVEN IN ER  Medications - No data to display        MEDICAL DECISION MAKING, PROGRESS, and CONSULTS    All labs have been independently reviewed by me.  All radiology studies have been reviewed by me and I have also reviewed the radiology  report.   EKG's independently viewed and interpreted by me.  Discussion below represents my analysis of pertinent findings related to patient's condition, differential diagnosis, treatment plan and final disposition.    63-year-old female presents with urinary retention status post anesthesia yesterday.  Labs stable, Bergman placed with improvement of symptoms.  CT abdomen pelvis not indicated as urinary retention is most likely secondary to anesthesia and patient reports a history of urinary retention after anesthesia in the past.  Will follow-up with [facility] for Bergman removal and further evaluation.          Shared decision making/consideration for admission: Stable for outpatient follow-up      Orders placed during this visit:  Orders Placed This Encounter   Procedures    Comprehensive Metabolic Panel    Urinalysis With Culture If Indicated - Urine, Catheter    CBC Auto Differential    Bladder scan    Insert Indwelling Urinary Catheter    CBC & Differential         Differential diagnosis include, but not limited to: Renal failure, UTI, urinary retention      Additional orders considered but not ordered: CT abdomen pelvis    Independent interpretation of labs, radiology studies, and discussions with consultants:    Discussed with Dr. Ashley, who agrees with plan.     ED Course as of 12/05/23 1251   Tue Dec 05, 2023   0842 Updated patient on ED workup, including labs and imaging (if performed). We discussed follow up instructions and return precautions. The patient verbalizes understanding and is ready for discharge. I also instructed the patient to follow up with urology.  [JG]      ED Course User Index  [JG] Padmini Nielsen APRN             DIAGNOSIS  Final diagnoses:   Acute urinary retention         DISPOSITION  discharge        Latest Documented Vital Signs:  As of 12:51 EST  BP- 146/77 HR- 89 Temp- 97.1 °F (36.2 °C) (Tympanic) O2 sat- 100%              --    Please note that portions of this were  completed with a voice recognition program.       Note Disclaimer: At [facility], we believe that sharing information builds trust and better relationships. You are receiving this note because you are receiving care at [facility] or recently visited. It is possible you will see health information before a provider has talked with you about it. This kind of information can be easy to misunderstand. To help you fully understand what it means for your health, we urge you to discuss this note with your provider.             Padmini Nielsen, APRN  12/05/23 1253

## 2023-12-05 NOTE — DISCHARGE INSTRUCTIONS
Follow up with [facility] for removal of your tapia catheter  Return to ER for fever, decreased urine output, severe pain, any new complaints or concerns

## 2023-12-05 NOTE — ED TRIAGE NOTES
To ER via PV.  C/o urinary retention.  Pt had colonoscopy yesterday with hemorrhoid banding.  Pt states unable to urinate since 2300.  States only urinated a little bit then.

## 2023-12-05 NOTE — ED NOTES
Pt sent home with leg bag. Pt educated on use of leg bag, emptying, and catheter care. Pt will follow up with urology.

## 2023-12-08 ENCOUNTER — TELEPHONE (OUTPATIENT)
Dept: SURGERY | Facility: CLINIC | Age: 63
End: 2023-12-08
Payer: COMMERCIAL

## 2023-12-08 RX ORDER — NITROFURANTOIN 25; 75 MG/1; MG/1
100 CAPSULE ORAL 2 TIMES DAILY
Qty: 10 CAPSULE | Refills: 0 | Status: SHIPPED | OUTPATIENT
Start: 2023-12-08 | End: 2023-12-13

## 2023-12-08 NOTE — TELEPHONE ENCOUNTER
"Spoke with pt, told her we could not prescribe an ABX without UA. Pt was tearful, said she has burning and dribbling. Pt said she could not come in give to give UA because she does not feel well. Pt said she had catheter removed yesterday at [facility] and she said they collected a specimen but when she called today for results, they had \"lost specimen\". Pt said she took pyridium OTC yesterday and today to help with pain. Pt states she is prone to getting UTI's.  "

## 2023-12-08 NOTE — TELEPHONE ENCOUNTER
Pt calling she had hemorrhoids banded and then ended up in the ER to get a catheter due to the fact she was unable to void her bladder. She feels like now she has a UTI and wanted to know if we would call in a script?  Binu on file is correct.

## 2023-12-11 ENCOUNTER — TELEPHONE (OUTPATIENT)
Dept: FAMILY MEDICINE CLINIC | Facility: CLINIC | Age: 63
End: 2023-12-11
Payer: COMMERCIAL

## 2023-12-11 NOTE — TELEPHONE ENCOUNTER
Patient has appt on 12-21. She wants to know if she can get order for UA and drop off a sample. Please call

## 2024-01-16 ENCOUNTER — OFFICE VISIT (OUTPATIENT)
Dept: SURGERY | Facility: CLINIC | Age: 64
End: 2024-01-16
Payer: COMMERCIAL

## 2024-01-16 VITALS
DIASTOLIC BLOOD PRESSURE: 72 MMHG | SYSTOLIC BLOOD PRESSURE: 140 MMHG | OXYGEN SATURATION: 100 % | HEIGHT: 60 IN | BODY MASS INDEX: 20.99 KG/M2 | WEIGHT: 106.9 LBS | HEART RATE: 104 BPM

## 2024-01-16 DIAGNOSIS — K59.00 CONSTIPATION, UNSPECIFIED CONSTIPATION TYPE: ICD-10-CM

## 2024-01-16 DIAGNOSIS — K64.8 INTERNAL HEMORRHOIDS WITH COMPLICATION: Primary | ICD-10-CM

## 2024-01-16 PROCEDURE — 99213 OFFICE O/P EST LOW 20 MIN: CPT | Performed by: PHYSICIAN ASSISTANT

## 2024-01-16 RX ORDER — AMLODIPINE BESYLATE 10 MG/1
10 TABLET ORAL DAILY
COMMUNITY
Start: 2023-11-06

## 2024-01-16 RX ORDER — BISACODYL 5 MG/1
15 TABLET, DELAYED RELEASE ORAL NIGHTLY
COMMUNITY

## 2024-01-16 NOTE — PROGRESS NOTES
"Rocío Valadez is a 63 y.o. female in for follow up of internal hemorrhoids status post rubber band ligation x 4 at the time of colonoscopy on 12/4/2023.    She feels great. Has not had bleeding or pain since healing from rubber banding. Has BM at least every couple of days. [place] 4. No straining. No longer needing to take Amitiza or any other laxatives. Just taking a few stool softeners daily, which is working well for her.     /72 (BP Location: Left arm, Patient Position: Sitting, Cuff Size: Small Adult)   Pulse 104   Ht 152.4 cm (60\")   Wt 48.5 kg (106 lb 14.4 oz)   SpO2 100%   Breastfeeding No   BMI 20.88 kg/m²   Body mass index is 20.88 kg/m².  -  Physical Exam  No acute distress  Chaperone present  Perianal exam: anterior anal tag. No sign of infection. No active bleeding.     Assessment:   1. Internal hemorrhoids with complication    2. Constipation, unspecified constipation type       Plan:  Continue current bowel regimen that is working well  Follow up as needed        Christie Ramirez PA-C  Physician Assistant  Colorectal Surgery    "

## 2024-02-01 RX ORDER — LUBIPROSTONE 8 UG/1
8 CAPSULE ORAL 2 TIMES DAILY WITH MEALS
Qty: 180 CAPSULE | Refills: 0 | OUTPATIENT
Start: 2024-02-01

## 2024-02-01 RX ORDER — PRUCALOPRIDE 2 MG/1
2 TABLET, FILM COATED ORAL DAILY
Qty: 90 TABLET | Refills: 0 | Status: SHIPPED | OUTPATIENT
Start: 2024-02-01

## 2024-02-05 ENCOUNTER — OFFICE VISIT (OUTPATIENT)
Dept: FAMILY MEDICINE CLINIC | Facility: CLINIC | Age: 64
End: 2024-02-05
Payer: COMMERCIAL

## 2024-02-05 VITALS
SYSTOLIC BLOOD PRESSURE: 142 MMHG | WEIGHT: 112.2 LBS | BODY MASS INDEX: 22.03 KG/M2 | DIASTOLIC BLOOD PRESSURE: 70 MMHG | HEART RATE: 114 BPM | HEIGHT: 60 IN | OXYGEN SATURATION: 99 % | TEMPERATURE: 98 F

## 2024-02-05 DIAGNOSIS — E78.00 PURE HYPERCHOLESTEROLEMIA: ICD-10-CM

## 2024-02-05 DIAGNOSIS — I10 BENIGN ESSENTIAL HYPERTENSION: Primary | ICD-10-CM

## 2024-02-05 DIAGNOSIS — B35.1 ONYCHOMYCOSIS OF LEFT GREAT TOE: ICD-10-CM

## 2024-02-05 PROBLEM — K29.50 CHRONIC GASTRITIS: Status: ACTIVE | Noted: 2024-02-05

## 2024-02-05 PROBLEM — K22.70 BARRETT'S ESOPHAGUS: Status: ACTIVE | Noted: 2024-02-05

## 2024-02-05 PROCEDURE — G2211 COMPLEX E/M VISIT ADD ON: HCPCS | Performed by: INTERNAL MEDICINE

## 2024-02-05 PROCEDURE — 99214 OFFICE O/P EST MOD 30 MIN: CPT | Performed by: INTERNAL MEDICINE

## 2024-02-05 RX ORDER — VALSARTAN 80 MG/1
80 TABLET ORAL DAILY
Qty: 90 TABLET | Refills: 1 | Status: SHIPPED | OUTPATIENT
Start: 2024-02-05

## 2024-02-05 RX ORDER — TERBINAFINE HYDROCHLORIDE 250 MG/1
250 TABLET ORAL DAILY
Qty: 90 TABLET | Refills: 1 | Status: SHIPPED | OUTPATIENT
Start: 2024-02-05

## 2024-02-05 RX ORDER — AMLODIPINE BESYLATE 5 MG/1
5 TABLET ORAL DAILY
Qty: 90 TABLET | Refills: 1 | Status: SHIPPED | OUTPATIENT
Start: 2024-02-05

## 2024-02-05 NOTE — PROGRESS NOTES
Subjective   Rocío Valadez is a 63 y.o. female.     Chief Complaint   Patient presents with    Hypertension     She has been going to pulmonary rehab and they tell her the blood pressures have been high there.     Nail Problem     She has fungus on her right big toe    Annual Exam       History of Present Illness   Follow-up for hypertension.  Currently, has been feeling well and asymptomatic without any vision changes, cough, chest pain, shortness of breath, swelling, focal neurologic deficit, memory loss or syncope.  Has been having some headaches.  Has been taking the medications regularly and adherent with the regimen of Maxide 25 daily, (not taking amlodipine 10 mg for over a month due to low BP) and valsartan 160 mg 1/2 tab.  Denies medication side effects and no significant interval events but in pulmonary rehab has had high BP of 178-181/86-95.      Follow-up for cholesterol.  Currently, has been feeling well without any myalgias, muscle aches, weakness, numbness, chest pain, short of breath or other issues.  Currently, is adherent with medication regimen of atorvastatin 10 mg and denies medication side effects. Is due for lab follow-up.     History of GERD currently controlled use of the Dexilant 60 mg daily (in past had tried omeprazole 40 mg and nexium 40 mg daily with persistent gastritis by endoscopy with Dr Galeano.     Known arthritic issues utilizing the meloxicam and tolerating well.     History of vitamin D deficiency on vitamin D 50,000 units weekly.     Is being evaluated for lung transplant but the [facility] transplant program is on pause without a .  Now on [facility] transplant program list.  In the pulmonary rehab program.     Patient with arthritis pain and osteoporosis.  Currently on forteo.  Ana partida developed right greater than left 5th finger with knot on palmar aspect and flexion contracture.  Has a lot of arthritis pain in the hands.     The following portions of the patient's  history were reviewed and updated as appropriate: allergies, current medications, past family history, past medical history, past social history, past surgical history and problem list.    Depression Screen:      2/5/2024     4:07 PM   PHQ-2/PHQ-9 Depression Screening   Little Interest or Pleasure in Doing Things 0-->not at all   Feeling Down, Depressed or Hopeless 0-->not at all   PHQ-9: Brief Depression Severity Measure Score 0       Past Medical History:   Diagnosis Date    Allergic rhinitis     FOLLOWED BY DR. DOMINICK QUIROZ    Anemia 08/2021    Anesthesia     ** PT REPORTED SHE IS VERY SENSITIVE TO ANESTHESIA    Arthritis     Asthma     Atypical chest pain     B12 deficiency 02/16/2022    Solares's esophagus     Benign essential hypertension     Bloating     Carrier of alpha-1-antitrypsin deficiency     Cervical muscle strain 01/08/2020    Chronic idiopathic constipation     Chronic respiratory failure with hypercapnia     Chronic vaginitis     Colon polyps     FOLLOWED BY DR. MARYJO YBARRA    COPD (chronic obstructive pulmonary disease)     SEVERE, ON LUNG TRANSPLANT LIST    Cough     COVID-19     Cystitis 09/2022    Diverticulitis of colon     Dyspareunia in female     Dysphagia     Early satiety     Environmental allergies     Gallbladder sludge     GERD (gastroesophageal reflux disease)     Greater trochanteric bursitis of left hip 08/2022    Headache     Hemorrhoids     Hiatal hernia     History of transfusion 6/20    Due to colon    Hyperlipidemia     Irritable bowel syndrome     Low back pain     Mucous retention cyst of salivary gland 04/2022    OF TONSIL, OFFERED REMOVAL, PT DECLINE, WATCHFUL FOLLOW UP, SEEN BY DR. HUSSAIN SINCLAIR    Oral thrush 06/2020    Osteoporosis     Oxygen dependent 08/12/2021    PNA (pneumonia) 06/2018    PNA (pneumonia) 08/29/2022    ADMITTED TO [facility]    Positive colorectal cancer screening using Cologuard test 04/2021    Postmenopausal HRT (hormone replacement therapy)      Postoperative urinary retention     Pulmonary nodule, right     Rotator cuff tear, right 06/2020    S/P REPAIR    Throat clearing     Vaginal atrophy     Vitamin D deficiency 02/16/2022       Past Surgical History:   Procedure Laterality Date    ABDOMINAL HYSTERECTOMY N/A 1994    PHIL WITH BSO, fibroid tumors and menorhagia    BREAST SURGERY Bilateral     enlargement procedure. [facility]    BRONCHOSCOPY N/A 12/31/2014    WITH ALVEOLAR LAVAGE    CARDIAC CATHETERIZATION Right 09/01/2022    NORMAL CORONARY ARTERIES, NO EVIDENCE OF OBSTRUCTIVE CAD, DR. ALEXIA PACE AT [facility]    COLONOSCOPY N/A 2008    [facility]    COLONOSCOPY N/A 12/04/2023    2 TUBULAR ADENOMA POLYPS IN ASCENDING, MODERATE HEMORRHOIDS, RESCOPE IN 5 YRS, DR. MARY CASTRO AT [facility]    COLONOSCOPY W/ POLYPECTOMY N/A 06/03/2021    TORTUOUS COLON, 4 MM POLYP IN HEPATIC FLEXURE, 20 MM POLYP IN RECTOSIGMOID, SIGMOID DIVERTICULA WITH NARROWING, HEMORRHOIDS, RESCOPE IN 3 YRS, DR. MARYJO YBARRA AT [facility]    COLONOSCOPY W/ POLYPECTOMY N/A 03/10/2016    2 BENIGN POLYPS IN RECTOSIGMOID, 1 BENIGN POLYP IN TRANSVERSE, SEVERE DIVERTICULOSIS IN SIGMOID, MODERATE DIVERTICULOSIS THROUGHOUT, RESCOPE IN 5 YRS, DR. MARYJO YBARRA AT [facility]    ENDOSCOPY N/A 03/10/2016    ESOPHAGITIS, Z LINE IRREGULAR, SMALL HIATAL HERNIA, ESOPHAGUS SPASMS, DR. MARYJO YBARRA AT [facility]    ENDOSCOPY N/A 02/09/2021    ESOPHAGEAL STENOSIS DILATED, DR. TASHA YBARRA AT [facility]    HEMORRHOIDECTOMY N/A 12/04/2023    Procedure: HEMORRHOID BANDING x4;  Surgeon: Mary Castro MD;  Location: [facility] ENDOSCOPY;  Service: General;  Laterality: N/A;  hemorrhoid bands    SHOULDER ARTHROSCOPY Right 06/17/2020    Procedure: SHOULDER ARTHROSCOPY  ROTATOR CUFF REPAIR ACROMIOPLASTY  PRP AUGMENTATION ;  Surgeon: Isidoro Albrecht MD;  Location:  [facility] [place];  Service: Orthopedics;  Laterality: Right;    TUBAL ABDOMINAL LIGATION Bilateral        Family History   Problem Relation Age of  Onset    COPD Mother     Diabetes Mother     Hypertension Mother     Heart disease Mother     Arthritis Mother     Heart attack Mother     Aneurysm Father     Hypertension Father     Cancer Brother     Liver cancer Brother     Cirrhosis Brother     Thyroid disease Daughter     Diabetes Maternal Grandmother     Cancer Maternal Grandmother     Lung cancer Maternal Grandmother     Hypertension Sister     Malig Hyperthermia Neg Hx        Social History     Socioeconomic History    Marital status:    Tobacco Use    Smoking status: Former     Packs/day: 1.50     Years: 30.00     Additional pack years: 0.00     Total pack years: 45.00     Types: Cigarettes     Start date: 1989     Quit date: 2013     Years since quittin.1     Passive exposure: Past    Smokeless tobacco: Never    Tobacco comments:     QUIT 7 YEARS AGO   Vaping Use    Vaping Use: Never used   Substance and Sexual Activity    Alcohol use: Not Currently     Alcohol/week: 2.0 standard drinks of alcohol     Types: 2 Glasses of wine per week     Comment: OCCASIONALLY     Drug use: Never    Sexual activity: Yes     Partners: Male     Birth control/protection: Post-menopausal, Tubal ligation, Hysterectomy       Current Outpatient Medications   Medication Sig Dispense Refill    acetaminophen (TYLENOL) 500 MG tablet 2 tablets.      amitriptyline (ELAVIL) 25 MG tablet Take 1 tablet by mouth every night at bedtime. 90 tablet 3    atorvastatin (LIPITOR) 20 MG tablet Take 1 tablet by mouth Daily.      azithromycin (ZITHROMAX) 250 MG tablet 1 tablet every Monday, Wednesday, and Friday      bisacodyl (DULCOLAX) 5 MG EC tablet Take 3 tablets by mouth Every Night.      budesonide-formoterol (SYMBICORT) 160-4.5 MCG/ACT inhaler Inhale 2 puffs 2 (Two) Times a Day.      cyanocobalamin 1000 MCG/ML injection Inject 1 mL into the appropriate muscle as directed by prescriber Every 30 (Thirty) Days. 3 mL 3    DALIRESP 500 MCG tablet tablet Take 1 tablet by mouth  Daily.      mupirocin (BACTROBAN) 2 % ointment APPLY TOPICALLY TO NASAL SORE TWICE DAILY      O2 (OXYGEN) Inhale 2 L/min 1 (One) Time. continuous      pantoprazole (Protonix) 40 MG EC tablet Take 1 tablet by mouth Daily. 30 tablet 5    PROAIR  (90 Base) MCG/ACT inhaler Inhale 1 puff Every 4 (Four) Hours As Needed.      Prucalopride Succinate (Motegrity) 2 MG tablet Take 1 tablet by mouth Daily. 90 tablet 0    Romosozumab-aqqg (Evenity) 105 MG/1.17ML subcutaneous solution Inject 2.34 mL under the skin into the appropriate area as directed Every 30 (Thirty) Days.      tiotropium (SPIRIVA) 18 MCG per inhalation capsule Place 1 capsule into inhaler and inhale Daily.      tretinoin (RETIN-A) 0.025 % cream Apply 1 Application topically to the appropriate area as directed Every Night.      triamterene-hydrochlorothiazide (MAXZIDE-25) 37.5-25 MG per tablet Take 1 tablet by mouth Daily. 90 tablet 2    valsartan (DIOVAN) 80 MG tablet Take 1 tablet by mouth Daily. 90 tablet 1    vitamin D (ERGOCALCIFEROL) 1.25 MG (66147 UT) capsule capsule TAKE 1 CAPSULE BY MOUTH 1 TIME EVERY WEEK 12 capsule 0    amLODIPine (NORVASC) 5 MG tablet Take 1 tablet by mouth Daily. 90 tablet 1    terbinafine (lamiSIL) 250 MG tablet Take 1 tablet by mouth Daily. 90 tablet 1     No current facility-administered medications for this visit.       Review of Systems   Constitutional:  Negative for activity change, appetite change, fatigue, fever, unexpected weight gain and unexpected weight loss.   HENT:  Negative for nosebleeds, rhinorrhea, trouble swallowing and voice change.    Eyes:  Negative for visual disturbance.   Respiratory:  Positive for cough and shortness of breath. Negative for chest tightness and wheezing.    Cardiovascular:  Negative for chest pain, palpitations and leg swelling.   Gastrointestinal:  Negative for abdominal pain, blood in stool, constipation, diarrhea, nausea, vomiting, GERD and indigestion.   Genitourinary:  Negative  "for dysuria, frequency and hematuria.   Musculoskeletal:  Negative for arthralgias, back pain and myalgias.   Skin:  Negative for rash and wound.        Thick and yellow first toenail on left   Neurological:  Negative for dizziness, tremors, weakness, light-headedness, numbness, headache and memory problem.   Hematological:  Negative for adenopathy. Does not bruise/bleed easily.   Psychiatric/Behavioral:  Negative for sleep disturbance and depressed mood. The patient is not nervous/anxious.        Objective   /70 (BP Location: Left arm, Patient Position: Sitting, Cuff Size: Adult)   Pulse 114   Temp 98 °F (36.7 °C) (Temporal)   Ht 152.4 cm (60\")   Wt 50.9 kg (112 lb 3.2 oz)   SpO2 99%   BMI 21.91 kg/m²     Physical Exam  Vitals and nursing note reviewed.   Constitutional:       General: She is not in acute distress.     Appearance: She is well-developed. She is not diaphoretic.   HENT:      Head: Normocephalic and atraumatic.      Right Ear: External ear normal.      Left Ear: External ear normal.      Nose: Nose normal.   Eyes:      Conjunctiva/sclera: Conjunctivae normal.      Pupils: Pupils are equal, round, and reactive to light.   Neck:      Thyroid: No thyromegaly.      Trachea: No tracheal deviation.   Cardiovascular:      Rate and Rhythm: Normal rate and regular rhythm.      Heart sounds: Normal heart sounds. No murmur heard.     No friction rub. No gallop.   Pulmonary:      Effort: Pulmonary effort is normal. No respiratory distress.      Breath sounds: Normal breath sounds.      Comments: On oxygen on demand per nasal canula.  Abdominal:      General: Bowel sounds are normal.      Palpations: Abdomen is soft. There is no mass.      Tenderness: There is no abdominal tenderness. There is no guarding.   Musculoskeletal:         General: Normal range of motion.      Cervical back: Normal range of motion and neck supple.   Lymphadenopathy:      Cervical: No cervical adenopathy.   Skin:     General: " Skin is warm and dry.      Capillary Refill: Capillary refill takes less than 2 seconds.      Findings: No rash.      Comments: Left first toenail yellow and some thickened distal half.   Neurological:      Mental Status: She is alert and oriented to person, place, and time.      Motor: No abnormal muscle tone.      Deep Tendon Reflexes: Reflexes normal.   Psychiatric:         Behavior: Behavior normal.         Thought Content: Thought content normal.         Judgment: Judgment normal.         Recent Results (from the past 2016 hour(s))   Urine Drug Screen -    Collection Time: 11/16/23  2:06 PM    Specimen: Urine   Result Value Ref Range    External Urine Drug Screen NEGATIVE Negative    Barbiturates Screen, Urine NEGATIVE Negative    Benzodiazepine Screen, Urine NEGATIVE Negative    Buprenorphine, Urine NEGATIVE Negative    Cocaine Screen, Urine NEGATIVE Negative    Methadone Screen, Urine NEGATIVE Negative    Opiates NEGATIVE Negative    Oxycodone Screen, Urine NEGATIVE Negative    THC Screen, Urine NEGATIVE Negative   ANTIBODY SCREEN    Collection Time: 11/28/23 10:09 AM    Specimen: Blood   Result Value Ref Range    Antibody Screen Negative ABSC    ABO/RH    Collection Time: 11/28/23 10:09 AM    Specimen: Blood   Result Value Ref Range    ABORh Yes Prev Hx     Method: Lara 2     Anti-A 0     Anti-B 0     Anti-D 40     A1 Cell 40     B Cell 30     ABORh O POS    COVIDIGGAB    Collection Time: 11/28/23 10:14 AM    Specimen: Blood   Result Value Ref Range    COVID-19 IgG AB  Negative Negative   HIV-1/2 AB / P24 AG COMBO    Collection Time: 11/28/23 10:14 AM    Specimen: Blood   Result Value Ref Range    HIV 1/2 AB+HIV p24 AG Nonreactive    MUMPS ANTIBODY, IGG    Collection Time: 11/28/23 10:14 AM    Specimen: Blood   Result Value Ref Range    Mumps IgG Not Immune (A) Immune   CYTOMEGALOVIRUS ANTIBODY, IGG    Collection Time: 11/28/23 10:14 AM    Specimen: Blood   Result Value Ref Range    CMV IgG, CSF POSITIVE  (A) Negative   HEPATITIS A ANTIBODY, TOTAL    Collection Time: 11/28/23 10:14 AM    Specimen: Blood   Result Value Ref Range    Hepatitis C IgG Ab NONREACTIVE Nonreactive   HEPATITIS C ANTIBODY    Collection Time: 11/28/23 10:14 AM    Specimen: Blood   Result Value Ref Range    External Hepatitis C Ab NONREACTIVE Nonreactive   HEPATITIS B SURFACE ANTIGEN    Collection Time: 11/28/23 10:14 AM    Specimen: Blood   Result Value Ref Range    Hep B S Ag Interp NONREACTIVE Nonreactive   HEPATITIS A ANTIBODY, IGM    Collection Time: 11/28/23 10:14 AM    Specimen: Blood   Result Value Ref Range    Hep A IgM NONREACTIVE Nonreactive   PREALBUMIN    Collection Time: 11/28/23 10:14 AM    Specimen: Blood   Result Value Ref Range    Prealbumin 20 17 - 34 mg/dL   PT AND PTT    Collection Time: 11/28/23 10:14 AM    Specimen: Blood   Result Value Ref Range    Protime 10.4 9.7 - 13.1 Second    INR 0.90     PTT 33.4 24.6 - 34.4 Second   AUTODIFF    Collection Time: 11/28/23 10:14 AM    Specimen: Blood   Result Value Ref Range    Neutrophil % 68.2 34.0 - 75.0 %    Lymphocyte % 19.3 17.0 - 53.0 %    Monocyte % 9.2 2.0 - 12.0 %    Eosinophil % 2.5 0.0 - 7.0 %    Basophil % 0.8 0.0 - 3.0 %    Neutrophils Absolute 5.2 1.5 - 7.1 x10(3)/ul    Lymphocytes Absolute 1.5 1.0 - 3.5 x10(3)/ul    Monocytes Absolute 0.7 0.0 - 1.0 x10(3)/ul    Eosinophils Absolute 0.2 0.0 - 0.7 x10(3)/ul    Basophils Absolute 0.1 0.0 - 0.3 x10(3)/ul   Tissue Pathology Exam    Collection Time: 12/04/23 12:56 PM    Specimen: Large Intestine, Right / Ascending Colon; Polyp   Result Value Ref Range    Case Report       Surgical Pathology Report                         Case: [id number]                                  Authorizing Provider:  Davis Castro MD        Collected:           12/04/2023 12:56 PM          Ordering Location:     [facility]  Received:            12/04/2023 02:01 PM                                 ENDO SUITES                                                                   Pathologist:           Lee Cordero MD                                                         Specimen:    Large Intestine, Right / Ascending Colon, ascending polyp                                  Final Diagnosis       1.  Colon, right ascending, biopsy: Tubular adenoma      Gross Description       1. Large Intestine, Right / Ascending Colon.  The specimen is received in formalin labeled with the patient's name and further designated 'ascending colon polyp' are multiple small fragments of gray-tan tissue. The specimen is submitted for embedding as received.         Comprehensive Metabolic Panel    Collection Time: 12/05/23  7:39 AM    Specimen: Blood   Result Value Ref Range    Glucose 113 (H) 65 - 99 mg/dL    BUN 17 8 - 23 mg/dL    Creatinine 0.88 0.57 - 1.00 mg/dL    Sodium 137 136 - 145 mmol/L    Potassium 4.1 3.5 - 5.2 mmol/L    Chloride 97 (L) 98 - 107 mmol/L    CO2 31.0 (H) 22.0 - 29.0 mmol/L    Calcium 9.7 8.6 - 10.5 mg/dL    Total Protein 6.8 6.0 - 8.5 g/dL    Albumin 4.7 3.5 - 5.2 g/dL    ALT (SGPT) 17 1 - 33 U/L    AST (SGOT) 16 1 - 32 U/L    Alkaline Phosphatase 87 39 - 117 U/L    Total Bilirubin 0.2 0.0 - 1.2 mg/dL    Globulin 2.1 gm/dL    A/G Ratio 2.2 g/dL    BUN/Creatinine Ratio 19.3 7.0 - 25.0    Anion Gap 9.0 5.0 - 15.0 mmol/L    eGFR 73.5 >60.0 mL/min/1.73   CBC Auto Differential    Collection Time: 12/05/23  7:39 AM    Specimen: Blood   Result Value Ref Range    WBC 6.36 3.40 - 10.80 10*3/mm3    RBC 4.06 3.77 - 5.28 10*6/mm3    Hemoglobin 11.3 (L) 12.0 - 15.9 g/dL    Hematocrit 34.5 34.0 - 46.6 %    MCV 85.0 79.0 - 97.0 fL    MCH 27.8 26.6 - 33.0 pg    MCHC 32.8 31.5 - 35.7 g/dL    RDW 13.0 12.3 - 15.4 %    RDW-SD 40.1 37.0 - 54.0 fl    MPV 10.0 6.0 - 12.0 fL    Platelets 324 140 - 450 10*3/mm3    Neutrophil % 65.2 42.7 - 76.0 %    Lymphocyte % 20.1 19.6 - 45.3 %    Monocyte % 11.9 5.0 - 12.0 %    Eosinophil % 2.0 0.3 - 6.2 %    Basophil % 0.3 0.0 -  1.5 %    Immature Grans % 0.5 0.0 - 0.5 %    Neutrophils, Absolute 4.14 1.70 - 7.00 10*3/mm3    Lymphocytes, Absolute 1.28 0.70 - 3.10 10*3/mm3    Monocytes, Absolute 0.76 0.10 - 0.90 10*3/mm3    Eosinophils, Absolute 0.13 0.00 - 0.40 10*3/mm3    Basophils, Absolute 0.02 0.00 - 0.20 10*3/mm3    Immature Grans, Absolute 0.03 0.00 - 0.05 10*3/mm3    nRBC 0.0 0.0 - 0.2 /100 WBC   Urinalysis With Culture If Indicated - Indwelling Urethral Catheter    Collection Time: 12/05/23  7:40 AM    Specimen: Indwelling Urethral Catheter; Urine   Result Value Ref Range    Color, UA Yellow Yellow, Straw    Appearance, UA Clear Clear    pH, UA 5.5 5.0 - 8.0    Specific Gravity, UA 1.021 1.005 - 1.030    Glucose,  mg/dL (1+) (A) Negative    Ketones, UA Negative Negative    Bilirubin, UA Negative Negative    Blood, UA Negative Negative    Protein, UA Negative Negative    Leuk Esterase, UA Negative Negative    Nitrite, UA Negative Negative    Urobilinogen, UA 0.2 E.U./dL 0.2 - 1.0 E.U./dL     Assessment & Plan   Diagnoses and all orders for this visit:    1. Benign essential hypertension (Primary)  -     amLODIPine (NORVASC) 5 MG tablet; Take 1 tablet by mouth Daily.  Dispense: 90 tablet; Refill: 1  -     valsartan (DIOVAN) 80 MG tablet; Take 1 tablet by mouth Daily.  Dispense: 90 tablet; Refill: 1    2. Pure hypercholesterolemia  -     Lipid Panel; Future    3. Onychomycosis of left great toe  -     terbinafine (lamiSIL) 250 MG tablet; Take 1 tablet by mouth Daily.  Dispense: 90 tablet; Refill: 1      Restart the amlodipine at 5 mg daily and continue the maxzide -25 and the valsartan 80 mg.  Monitor blood pressure at home.  Recheck blood pressure in 1 to 2 months.  Onychomycosis left first nail discussed medication possibilities we will try oral terbinafine daily monitor closely recheck liver enzymes in 1 to 2 months.  Plan to be on the medicine likely for about 4 months.  She will notify her transplant team of her new  medications.         COVID-19 Precautions - Patient was compliant in wearing a mask. When I saw the patient, I used appropriate personal protective equipment (PPE) including mask and eye shield (standard procedure).  Additionally, I used gown and gloves if indicated.  Hand hygiene was completed before and after seeing the patient.  Dictated utilizing Dragon Dictation

## 2024-02-23 ENCOUNTER — OFFICE VISIT (OUTPATIENT)
Dept: FAMILY MEDICINE CLINIC | Facility: CLINIC | Age: 64
End: 2024-02-23
Payer: COMMERCIAL

## 2024-02-23 ENCOUNTER — TELEPHONE (OUTPATIENT)
Dept: FAMILY MEDICINE CLINIC | Facility: CLINIC | Age: 64
End: 2024-02-23
Payer: COMMERCIAL

## 2024-02-23 VITALS
DIASTOLIC BLOOD PRESSURE: 70 MMHG | OXYGEN SATURATION: 91 % | SYSTOLIC BLOOD PRESSURE: 140 MMHG | TEMPERATURE: 98.7 F | BODY MASS INDEX: 21.91 KG/M2 | HEART RATE: 98 BPM | WEIGHT: 111.6 LBS | HEIGHT: 60 IN

## 2024-02-23 DIAGNOSIS — T50.Z95A VACCINE REACTION, INITIAL ENCOUNTER: Primary | ICD-10-CM

## 2024-02-23 RX ORDER — PREDNISONE 5 MG/1
1 TABLET ORAL DAILY
Qty: 21 TABLET | Refills: 0 | Status: SHIPPED | OUTPATIENT
Start: 2024-02-23 | End: 2024-02-29

## 2024-02-23 NOTE — TELEPHONE ENCOUNTER
Patients pharmacy called and stated the patient got a vaccine there a couple days ago and the injection site is swollen and itching. They asked if someone clinical could call her and a doctor could perhaps send her something in.

## 2024-02-23 NOTE — TELEPHONE ENCOUNTER
I called and spoke with Rocío she said it was an RSV shot that was given to her on accident. She said she was at [facility] to get her follow up Hep A and B shot because she is on the transplant list and she said they accidentally gave her the RSV shot meant for her . She had already gotten an RSV shot previously the month before. This happened on Wednesday afternoon and she noticed when she went home afterwards it was red around it but yesterday when she got up she realized it was worse. She said now it is almost down to her elbow and in the inside of her arm. It itches as well but is not supper swollen except around the original site. She said she has not taken anything for it because she is on oxygen and on the lung transplant list and sometimes antihistamines mess with her breathing so she did not feel comfortable. I let her know I would send a message to one of our nurse practitioners since Dr Calixto is out. I advised in the meantime if it went below her elbow and she had not heard from us then she needed to go be seen. She verbalized understanding and also wanted me to ask if taking two RSV shots that close together would harm her. I let her know I would ask this as well. She verbalized understanding and will call back if needed.

## 2024-02-23 NOTE — PROGRESS NOTES
"Chief Complaint  Allergic Reaction    Subjective        Rocío Valadez presents to [facility]  Allergic Reaction      Patient is here to get evaluated due to a vaccine reaction. She has RSV vaccine given on Wednesday 2/21/24 at [facility]. She was already given vaccine approx one month ago and was supposed to get hep a/b combo vaccine. She began to have redness at injection site the night after vaccine. The redness has worsened over the last two days. It is very warm and red on left uppper arm. She did not have any reaction to the original RSV vaccine she received. Some reported headache and maybe slight worsening shortness of breath. She has taken steroids in the past with no concerns but has shortness of breath with antihistamines. She is already on oxygen due to lung disease. She is on transplant list for lung transplant.   Objective   Vital Signs:  /70 (BP Location: Right arm, Patient Position: Sitting, Cuff Size: Adult)   Pulse 98   Temp 98.7 °F (37.1 °C) (Temporal)   Ht 152.4 cm (60\")   Wt 50.6 kg (111 lb 9.6 oz)   SpO2 91%   BMI 21.80 kg/m²   Estimated body mass index is 21.8 kg/m² as calculated from the following:    Height as of this encounter: 152.4 cm (60\").    Weight as of this encounter: 50.6 kg (111 lb 9.6 oz).       BMI is within normal parameters. No other follow-up for BMI required.      Physical Exam  Skin:            Comments: Very red and warm erythema on left upper arm.         Result Review :                     Assessment and Plan     Diagnoses and all orders for this visit:    1. Vaccine reaction, initial encounter (Primary)  -     predniSONE 5 MG (21) tablet therapy pack dose pack; Take 1 tablet by mouth Daily for 6 days. Take as directed on package instructions.  Dispense: 21 tablet; Refill: 0      Start prednisone as soon as possible. She is to present to ER if symptoms worsen.        Follow Up     Return if symptoms worsen or fail to " improve.  Patient was given instructions and counseling regarding her condition or for health maintenance advice. Please see specific information pulled into the AVS if appropriate.

## 2024-02-23 NOTE — TELEPHONE ENCOUNTER
I called and spoke with the patient after speaking with tiffany and let her know Tiffany said it is most likely a site reaction that would go away and we could just monito or we could scheduled her to be seen and she can come on in and be seen when she gets here. She said she wants to be seen because she didn't have this reaction with the first vaccine and is feels better being checked out. I scheduled her to be seen and she will call back if she needs anything else.

## 2024-02-29 DIAGNOSIS — I10 BENIGN ESSENTIAL HYPERTENSION: ICD-10-CM

## 2024-02-29 RX ORDER — VALSARTAN 160 MG/1
160 TABLET ORAL DAILY
Qty: 30 TABLET | Refills: 3 | OUTPATIENT
Start: 2024-02-29

## 2024-03-28 ENCOUNTER — TELEPHONE (OUTPATIENT)
Dept: FAMILY MEDICINE CLINIC | Facility: CLINIC | Age: 64
End: 2024-03-28
Payer: COMMERCIAL

## 2024-03-28 DIAGNOSIS — K22.70 BARRETT'S ESOPHAGUS WITHOUT DYSPLASIA: Primary | ICD-10-CM

## 2024-03-28 DIAGNOSIS — K21.9 GASTROESOPHAGEAL REFLUX DISEASE WITHOUT ESOPHAGITIS: ICD-10-CM

## 2024-03-28 DIAGNOSIS — K29.50 CHRONIC GASTRITIS WITHOUT BLEEDING, UNSPECIFIED GASTRITIS TYPE: ICD-10-CM

## 2024-03-28 RX ORDER — DEXLANSOPRAZOLE 60 MG/1
60 CAPSULE, DELAYED RELEASE ORAL DAILY
Qty: 90 CAPSULE | Refills: 1 | Status: SHIPPED | OUTPATIENT
Start: 2024-03-28

## 2024-03-28 NOTE — TELEPHONE ENCOUNTER
I called and spoke with the patient and she said her medication for acid reflux was not working for her. She said she was taking multiple of her current medications and was still having problems. She said the pharmacy had faxed over the authorization which I have not received but I let her know the prescription for it would have to be sent in before I could do the PA for her. She verbalized understanding and I let her know I would call her with updates. In the meantime she will call back if needed.

## 2024-03-28 NOTE — TELEPHONE ENCOUNTER
I called and spoke with the patient again and let her know that I could not send in the PA without a current prescription. She said her pharmacy said they had the prescription and were waiting on the PA. I let her know that they may have the old prescription but unless I see it on her current medicine list I could not do the PA. I let her know I had sent a message to Dr Calixto to ask him to send it in but that he was seeing patients and in meetings and would most likely not see it until later today. I also let her know that I would not be in the office tomorrow so was unsure of if it would be done tomorrow unless someone got in my basket to help. She was upset with this but understood. Patient will call back if needed.    Yes

## 2024-03-28 NOTE — TELEPHONE ENCOUNTER
Caller: Rocío Valadez    Relationship: Self    Best call back number: [phone]    What form or medical record are you requesting: PRIOR AUTHORIZATION    Who is requesting this form or medical record from you: PATIENT STATES THAT HER INSURANCE ADVISED THAT THEY HAD SENT A FORM TO HER PRIMARY CARE PROVIDERS OFFICE TO BE FILLED OUT IN ORDER FOR DEXLANSOPRZOLE TO BE APPROVED FOR HER.    SHE IS WONDERING WHERE WE ARE IN THE PROCESS.    PLEASE CALL TO ADVISE.    THANK YOU

## 2024-03-28 NOTE — TELEPHONE ENCOUNTER
Hub staff attempted to follow warm transfer process and was unsuccessful     Caller: Rocío Valadez    Relationship to patient: Self    Best call back number: [phone]    Patient is needing: PATIENT STATES THAT HER INSURANCE IS WAITING ON A PRIOR AUTHORIZATION FOR A PRESCRIPTION TO BE CALLED IN. PATIENT STATES THAT SHE SPOKE WITH HER INSURANCE AND THEY ARE STILL WAITING FOR THIS  PATIENT WOULD LIKE A CALL BACK FROM [facility] ONCE THIS HAS BEEN COMPLETED.

## 2024-04-01 ENCOUNTER — PRIOR AUTHORIZATION (OUTPATIENT)
Dept: FAMILY MEDICINE CLINIC | Facility: CLINIC | Age: 64
End: 2024-04-01
Payer: COMMERCIAL

## 2024-04-01 NOTE — TELEPHONE ENCOUNTER
I called and spoke with the patient and let her know that I did a PA for her dexilant this morning and am awaiting a decision. She verbalized understanding and will call back if needed.

## 2024-04-01 NOTE — TELEPHONE ENCOUNTER
A PA has been initiated for the patients Dexilant and I am awaiting a decision.    KEY:[id number]    PA has been approved patient has been called and informed.

## 2024-04-19 ENCOUNTER — TELEMEDICINE (OUTPATIENT)
Dept: FAMILY MEDICINE CLINIC | Facility: TELEHEALTH | Age: 64
End: 2024-04-19
Payer: COMMERCIAL

## 2024-04-19 DIAGNOSIS — N39.0 URINARY TRACT INFECTION WITHOUT HEMATURIA, SITE UNSPECIFIED: Primary | ICD-10-CM

## 2024-04-19 RX ORDER — COLLAGENASE CLOSTRIDIUM HISTOLYTICUM 0.9 MG
KIT INJECTION
COMMUNITY
Start: 2024-03-06

## 2024-04-19 RX ORDER — ALBUTEROL SULFATE 2.5 MG/3ML
2.5 SOLUTION RESPIRATORY (INHALATION)
COMMUNITY
Start: 2024-03-22

## 2024-04-19 RX ORDER — NITROFURANTOIN 25; 75 MG/1; MG/1
100 CAPSULE ORAL 2 TIMES DAILY
Qty: 14 CAPSULE | Refills: 0 | Status: SHIPPED | OUTPATIENT
Start: 2024-04-19 | End: 2024-04-26

## 2024-04-19 RX ORDER — PHENAZOPYRIDINE HYDROCHLORIDE 200 MG/1
200 TABLET, FILM COATED ORAL 3 TIMES DAILY PRN
Qty: 6 TABLET | Refills: 0 | Status: SHIPPED | OUTPATIENT
Start: 2024-04-19 | End: 2024-04-21

## 2024-04-19 NOTE — PROGRESS NOTES
Chief Complaint   Patient presents with    Urinary Tract Infection       Video Visit Reason:   Free Text Description: UTI  Subjective   Rocío Valadez is a 64 y.o. female.     History of Present Illness  Urinary pain, burning and frequency for a few days with increasing severity. She has had no fever, chills, back pain or hematuria. She has had macrobid in the past that has worked for her.  Urinary Tract Infection   This is a new problem. The current episode started in the past 7 days. The problem occurs constantly. The quality of the pain is described as burning. There has been no fever. Associated symptoms include frequency, hesitancy and urgency. Pertinent negatives include no chills, flank pain, hematuria, nausea or vomiting. She has tried increased fluids for the symptoms. The treatment provided no relief.       The following portions of the patient's history were reviewed and updated as appropriate: allergies, current medications, past medical history, and problem list.      Past Medical History:   Diagnosis Date    Allergic rhinitis     FOLLOWED BY DR. DOMINICK QUIROZ    Anemia 08/2021    Anesthesia     ** PT REPORTED SHE IS VERY SENSITIVE TO ANESTHESIA    Arthritis     Asthma     Atypical chest pain     B12 deficiency 02/16/2022    Solares's esophagus     Benign essential hypertension     Bloating     Carrier of alpha-1-antitrypsin deficiency     Cervical muscle strain 01/08/2020    Chronic idiopathic constipation     Chronic respiratory failure with hypercapnia     Chronic vaginitis     Colon polyps     FOLLOWED BY DR. MARYJO YBARRA    COPD (chronic obstructive pulmonary disease)     SEVERE, ON LUNG TRANSPLANT LIST    Cough     COVID-19     Cystitis 09/2022    Diverticulitis of colon     Dyspareunia in female     Dysphagia     Early satiety     Environmental allergies     Gallbladder sludge     GERD (gastroesophageal reflux disease)     Greater trochanteric bursitis of left hip 08/2022    Headache      Hemorrhoids     Hiatal hernia     History of transfusion     Due to colon    Hyperlipidemia     Irritable bowel syndrome     Low back pain     Mucous retention cyst of salivary gland 2022    OF TONSIL, OFFERED REMOVAL, PT DECLINE, WATCHFUL FOLLOW UP, SEEN BY DR. HUSSAIN SINCLAIR    Oral thrush 2020    Osteoporosis     Oxygen dependent 2021    PNA (pneumonia) 2018    PNA (pneumonia) 2022    [facility]MITT[facility] TO [facility]    Positive colorectal cancer screening using Cologuard test 2021    Postmenopausal HRT (hormone replacement therapy)     Postoperative urinary retention     Pulmonary nodule, right     Rotator cuff tear, right 2020    S/P REPAIR    Throat clearing     Vaginal atrophy     Vitamin D deficiency 2022     Social History     Socioeconomic History    Marital status:    Tobacco Use    Smoking status: Former     Current packs/day: 0.00     Average packs/day: 1.5 packs/day for 30.0 years (45.0 ttl pk-yrs)     Types: Cigarettes     Start date: 1989     Quit date: 2013     Years since quittin.3     Passive exposure: Past    Smokeless tobacco: Never    Tobacco comments:     QUIT 7 YEARS AGO   Vaping Use    Vaping status: Never Used   Substance and Sexual Activity    Alcohol use: Not Currently     Alcohol/week: 2.0 standard drinks of alcohol     Types: 2 Glasses of wine per week     Comment: OCCASIONALLY     Drug use: Never    Sexual activity: Yes     Partners: Male     Birth control/protection: Post-menopausal, Tubal ligation, Hysterectomy     medication documentation: reviewed and updated with patient and   Current Outpatient Medications:     albuterol (PROVENTIL) (2.5 MG/3ML) 0.083% nebulizer solution, Inhale 2.5 mg., Disp: , Rfl:     Xiaflex 0.9 MG injection, , Disp: , Rfl:     acetaminophen (TYLENOL) 500 MG tablet, 2 tablets., Disp: , Rfl:     amitriptyline (ELAVIL) 25 MG tablet, Take 1 tablet by mouth every night at bedtime., Disp: 90 tablet, Rfl: 3     atorvastatin (LIPITOR) 20 MG tablet, Take 1 tablet by mouth Daily., Disp: , Rfl:     bisacodyl (DULCOLAX) 5 MG EC tablet, Take 3 tablets by mouth Every Night., Disp: , Rfl:     budesonide-formoterol (SYMBICORT) 160-4.5 MCG/ACT inhaler, Inhale 2 puffs 2 (Two) Times a Day., Disp: , Rfl:     cyanocobalamin 1000 MCG/ML injection, Inject 1 mL into the appropriate muscle as directed by prescriber Every 30 (Thirty) Days., Disp: 3 mL, Rfl: 3    DALIRESP 500 MCG tablet tablet, Take 1 tablet by mouth Daily., Disp: , Rfl:     dexlansoprazole (DEXILANT) 60 MG capsule, Take 1 capsule by mouth Daily., Disp: 90 capsule, Rfl: 1    mupirocin (BACTROBAN) 2 % ointment, APPLY TOPICALLY TO NASAL SORE TWICE DAILY, Disp: , Rfl:     nitrofurantoin, macrocrystal-monohydrate, (MACROBID) 100 MG capsule, Take 1 capsule by mouth 2 (Two) Times a Day for 7 days., Disp: 14 capsule, Rfl: 0    O2 (OXYGEN), Inhale 2 L/min 1 (One) Time. continuous, Disp: , Rfl:     phenazopyridine (Pyridium) 200 MG tablet, Take 1 tablet by mouth 3 (Three) Times a Day As Needed for Bladder Spasms for up to 2 days., Disp: 6 tablet, Rfl: 0    PROAIR  (90 Base) MCG/ACT inhaler, Inhale 1 puff Every 4 (Four) Hours As Needed., Disp: , Rfl:     Prucalopride Succinate (Motegrity) 2 MG tablet, Take 1 tablet by mouth Daily., Disp: 90 tablet, Rfl: 0    Romosozumab-aqqg (Evenity) 105 MG/1.17ML subcutaneous solution, Inject 2.34 mL under the skin into the appropriate area as directed Every 30 (Thirty) Days., Disp: , Rfl:     terbinafine (lamiSIL) 250 MG tablet, Take 1 tablet by mouth Daily., Disp: 90 tablet, Rfl: 1    tiotropium (SPIRIVA) 18 MCG per inhalation capsule, Place 1 capsule into inhaler and inhale Daily., Disp: , Rfl:     tretinoin (RETIN-A) 0.025 % cream, Apply 1 Application topically to the appropriate area as directed Every Night., Disp: , Rfl:     triamterene-hydrochlorothiazide (MAXZIDE-25) 37.5-25 MG per tablet, Take 1 tablet by mouth Daily., Disp: 90  tablet, Rfl: 2    valsartan (DIOVAN) 80 MG tablet, Take 1 tablet by mouth Daily., Disp: 90 tablet, Rfl: 1    vitamin D (ERGOCALCIFEROL) 1.25 MG (59461 UT) capsule capsule, TAKE 1 CAPSULE BY MOUTH 1 TIME EVERY WEEK, Disp: 12 capsule, Rfl: 0  Review of Systems   Constitutional:  Negative for activity change, chills and fever.   Gastrointestinal:  Negative for nausea and vomiting.   Genitourinary:  Positive for dysuria, frequency, hesitancy and urgency. Negative for decreased urine volume, difficulty urinating, flank pain, hematuria, pelvic pain and vaginal discharge.   Musculoskeletal:  Negative for back pain.       Objective   Physical Exam  Constitutional:       General: She is not in acute distress.     Appearance: She is not ill-appearing.   Neurological:      Mental Status: She is alert.         Assessment & Plan   Diagnoses and all orders for this visit:    1. Urinary tract infection without hematuria, site unspecified (Primary)  -     nitrofurantoin, macrocrystal-monohydrate, (MACROBID) 100 MG capsule; Take 1 capsule by mouth 2 (Two) Times a Day for 7 days.  Dispense: 14 capsule; Refill: 0  -     phenazopyridine (Pyridium) 200 MG tablet; Take 1 tablet by mouth 3 (Three) Times a Day As Needed for Bladder Spasms for up to 2 days.  Dispense: 6 tablet; Refill: 0                    Follow Up:  If your symptoms are not resolving by the completion of your treatment or are worsening, see your primary care provider for follow up. If you don't have a primary care provider, you may go to any Urgent Care for re-evaluation. If you develop any life threatening symptoms, go to the nearest Emergency Department immediately or call EMS.               The use of  Video Visit was utilized during this visit, using both [employer] and [employer]. The use of a video visit has been reviewed with the patient and verbal informed consent has been obtained. No technical difficulties occurred during the visit.    is [place] at [id number] [address]  [address] [facility] [place] [address]  Provider is located at [place], [place]

## 2024-04-19 NOTE — PATIENT INSTRUCTIONS
Urinary Tract Infection, Adult    A urinary tract infection (UTI) is an infection of any part of the urinary tract. The urinary tract includes the kidneys, ureters, bladder, and urethra. These organs make, store, and get rid of urine in the body.  An upper UTI affects the ureters and kidneys. A lower UTI affects the bladder and urethra.  What are the causes?  Most urinary tract infections are caused by bacteria in your genital area around your urethra, where urine leaves your body. These bacteria grow and cause inflammation of your urinary tract.  What increases the risk?  You are more likely to develop this condition if:  You have a urinary catheter that stays in place.  You are not able to control when you urinate or have a bowel movement (incontinence).  You are female and you:  Use a spermicide or diaphragm for birth control.  Have low estrogen levels.  Are pregnant.  You have certain genes that increase your risk.  You are sexually active.  You take antibiotic medicines.  You have a condition that causes your flow of urine to slow down, such as:  An enlarged prostate, if you are male.  Blockage in your urethra.  A kidney stone.  A nerve condition that affects your bladder control (neurogenic bladder).  Not getting enough to drink, or not urinating often.  You have certain medical conditions, such as:  Diabetes.  A weak disease-fighting system (immunesystem).  Sickle cell disease.  Gout.  Spinal cord injury.  What are the signs or symptoms?  Symptoms of this condition include:  Needing to urinate right away (urgency).  Frequent urination. This may include small amounts of urine each time you urinate.  Pain or burning with urination.  Blood in the urine.  Urine that smells bad or unusual.  Trouble urinating.  Cloudy urine.  Vaginal discharge, if you are female.  Pain in the abdomen or the lower back.  You may also have:  Vomiting or a decreased appetite.  Confusion.  Irritability or tiredness.  A fever or  chills.  Diarrhea.  The first symptom in older adults may be confusion. In some cases, they may not have any symptoms until the infection has worsened.  How is this diagnosed?  This condition is diagnosed based on your medical history and a physical exam. You may also have other tests, including:  Urine tests.  Blood tests.  Tests for STIs (sexually transmitted infections).  If you have had more than one UTI, a cystoscopy or imaging studies may be done to determine the cause of the infections.  How is this treated?  Treatment for this condition includes:  Antibiotic medicine.  Over-the-counter medicines to treat discomfort.  Drinking enough water to stay hydrated.  If you have frequent infections or have other conditions such as a kidney stone, you may need to see a health care provider who specializes in the urinary tract (urologist).  In rare cases, urinary tract infections can cause sepsis. Sepsis is a life-threatening condition that occurs when the body responds to an infection. Sepsis is treated in the hospital with IV antibiotics, fluids, and other medicines.  Follow these instructions at home:    Medicines  Take over-the-counter and prescription medicines only as told by your health care provider.  If you were prescribed an antibiotic medicine, take it as told by your health care provider. Do not stop using the antibiotic even if you start to feel better.  General instructions  Make sure you:  Empty your bladder often and completely. Do not hold urine for long periods of time.  Empty your bladder after sex.  Wipe from front to back after urinating or having a bowel movement if you are female. Use each tissue only one time when you wipe.  Drink enough fluid to keep your urine pale yellow.  Keep all follow-up visits. This is important.  Contact a health care provider if:  Your symptoms do not get better after 1-2 days.  Your symptoms go away and then return.  Get help right away if:  You have severe pain in  your back or your lower abdomen.  You have a fever or chills.  You have nausea or vomiting.  Summary  A urinary tract infection (UTI) is an infection of any part of the urinary tract, which includes the kidneys, ureters, bladder, and urethra.  Most urinary tract infections are caused by bacteria in your genital area.  Treatment for this condition often includes antibiotic medicines.  If you were prescribed an antibiotic medicine, take it as told by your health care provider. Do not stop using the antibiotic even if you start to feel better.  Keep all follow-up visits. This is important.  This information is not intended to replace advice given to you by your health care provider. Make sure you discuss any questions you have with your health care provider.  Document Revised: 07/25/2021 Document Reviewed: 07/30/2021  Elsevier Patient Education © 2024 [employer] Inc.

## 2024-04-29 RX ORDER — PRUCALOPRIDE 2 MG/1
2 TABLET, FILM COATED ORAL DAILY
Qty: 90 TABLET | Refills: 0 | Status: SHIPPED | OUTPATIENT
Start: 2024-04-29

## 2024-05-03 DIAGNOSIS — E53.8 B12 DEFICIENCY: ICD-10-CM

## 2024-05-04 DIAGNOSIS — I10 BENIGN ESSENTIAL HYPERTENSION: ICD-10-CM

## 2024-05-06 RX ORDER — CYANOCOBALAMIN 1000 UG/ML
INJECTION, SOLUTION INTRAMUSCULAR; SUBCUTANEOUS
Qty: 3 ML | Refills: 3 | Status: SHIPPED | OUTPATIENT
Start: 2024-05-06

## 2024-05-06 RX ORDER — TRIAMTERENE AND HYDROCHLOROTHIAZIDE 37.5; 25 MG/1; MG/1
1 TABLET ORAL DAILY
Qty: 90 TABLET | Refills: 0 | Status: SHIPPED | OUTPATIENT
Start: 2024-05-06

## 2024-05-20 DIAGNOSIS — E53.8 B12 DEFICIENCY: ICD-10-CM

## 2024-05-20 RX ORDER — CYANOCOBALAMIN 1000 UG/ML
INJECTION, SOLUTION INTRAMUSCULAR; SUBCUTANEOUS
Qty: 3 ML | Refills: 3 | OUTPATIENT
Start: 2024-05-20

## 2024-06-19 DIAGNOSIS — E53.8 B12 DEFICIENCY: ICD-10-CM

## 2024-06-20 RX ORDER — CYANOCOBALAMIN 1000 UG/ML
1000 INJECTION, SOLUTION INTRAMUSCULAR; SUBCUTANEOUS
Qty: 3 ML | Refills: 3 | Status: SHIPPED | OUTPATIENT
Start: 2024-06-20

## 2024-06-28 ENCOUNTER — OFFICE VISIT (OUTPATIENT)
Dept: FAMILY MEDICINE CLINIC | Facility: CLINIC | Age: 64
End: 2024-06-28
Payer: COMMERCIAL

## 2024-06-28 ENCOUNTER — TELEPHONE (OUTPATIENT)
Dept: FAMILY MEDICINE CLINIC | Facility: CLINIC | Age: 64
End: 2024-06-28
Payer: COMMERCIAL

## 2024-06-28 VITALS
OXYGEN SATURATION: 96 % | SYSTOLIC BLOOD PRESSURE: 120 MMHG | DIASTOLIC BLOOD PRESSURE: 70 MMHG | HEART RATE: 110 BPM | WEIGHT: 108.2 LBS | TEMPERATURE: 97.8 F | BODY MASS INDEX: 21.24 KG/M2 | HEIGHT: 60 IN

## 2024-06-28 DIAGNOSIS — E53.8 B12 DEFICIENCY: ICD-10-CM

## 2024-06-28 DIAGNOSIS — B37.0 ORAL THRUSH: ICD-10-CM

## 2024-06-28 DIAGNOSIS — K21.9 GASTROESOPHAGEAL REFLUX DISEASE WITHOUT ESOPHAGITIS: ICD-10-CM

## 2024-06-28 DIAGNOSIS — E55.9 VITAMIN D DEFICIENCY: ICD-10-CM

## 2024-06-28 DIAGNOSIS — E78.2 MIXED HYPERLIPIDEMIA: ICD-10-CM

## 2024-06-28 DIAGNOSIS — J44.9 CHRONIC OBSTRUCTIVE PULMONARY DISEASE, UNSPECIFIED COPD TYPE: Primary | ICD-10-CM

## 2024-06-28 DIAGNOSIS — I10 BENIGN ESSENTIAL HYPERTENSION: ICD-10-CM

## 2024-06-28 PROCEDURE — 99214 OFFICE O/P EST MOD 30 MIN: CPT | Performed by: FAMILY MEDICINE

## 2024-06-28 RX ORDER — HYDROCHLOROTHIAZIDE 25 MG/1
25 TABLET ORAL DAILY
Qty: 30 TABLET | Refills: 3 | Status: SHIPPED | OUTPATIENT
Start: 2024-06-28

## 2024-06-28 RX ORDER — VALSARTAN 80 MG/1
80 TABLET ORAL DAILY
Qty: 90 TABLET | Refills: 1 | Status: SHIPPED | OUTPATIENT
Start: 2024-06-28

## 2024-06-28 RX ORDER — TRIAMTERENE AND HYDROCHLOROTHIAZIDE 37.5; 25 MG/1; MG/1
1 TABLET ORAL DAILY
Qty: 90 TABLET | Refills: 0 | Status: SHIPPED | OUTPATIENT
Start: 2024-06-28 | End: 2024-07-01 | Stop reason: SDUPTHER

## 2024-06-28 RX ORDER — DEXLANSOPRAZOLE 60 MG/1
60 CAPSULE, DELAYED RELEASE ORAL DAILY
Qty: 90 CAPSULE | Refills: 1 | Status: SHIPPED | OUTPATIENT
Start: 2024-06-28

## 2024-06-28 RX ORDER — ERGOCALCIFEROL 1.25 MG/1
50000 CAPSULE ORAL WEEKLY
Qty: 12 CAPSULE | Refills: 0 | Status: SHIPPED | OUTPATIENT
Start: 2024-06-28

## 2024-06-28 RX ORDER — AMITRIPTYLINE HYDROCHLORIDE 25 MG/1
25 TABLET, FILM COATED ORAL
Qty: 90 TABLET | Refills: 3 | Status: SHIPPED | OUTPATIENT
Start: 2024-06-28

## 2024-06-28 NOTE — TELEPHONE ENCOUNTER
Pharmacy called and stated that maxzide is on backorder everywhere and an alternate needs to be sent in.

## 2024-06-28 NOTE — PROGRESS NOTES
"Chief Complaint  burning tongue    Subjective        Rocío Valadez presents to [facility]  History of Present Illness  Pt presents today with .  Pt is new to me and is a pt of Dr Calixto.    She has been having burning in tongue for the past few weeks.  She is on steroid inhaler for her COPD.  She does rinse her mouth daily after inhaler use.  She is on 3 L O2  B12 deficiency- she had her last shot about a week ago.    HTN- no CP or HA  HLD on med and stable.    She is on wait list for lung transplant.    Vit D deficiency- takes 02189 every 3 weeks.     Objective   Vital Signs:  /70 (BP Location: Right arm, Patient Position: Sitting, Cuff Size: Adult)   Pulse 110   Temp 97.8 °F (36.6 °C)   Ht 152.4 cm (60\")   Wt 49.1 kg (108 lb 3.2 oz)   SpO2 96%   BMI 21.13 kg/m²   Estimated body mass index is 21.13 kg/m² as calculated from the following:    Height as of this encounter: 152.4 cm (60\").    Weight as of this encounter: 49.1 kg (108 lb 3.2 oz).       BMI is within normal parameters. No other follow-up for BMI required.      Physical Exam  Vitals and nursing note reviewed.   Constitutional:       Appearance: Normal appearance. She is well-developed.   HENT:      Head: Normocephalic and atraumatic.      Nose: No congestion or rhinorrhea.      Mouth/Throat:      Mouth: Mucous membranes are moist.      Comments: Oral thrush  Cardiovascular:      Rate and Rhythm: Normal rate and regular rhythm.      Heart sounds: Normal heart sounds. No murmur heard.  Pulmonary:      Effort: Pulmonary effort is normal. No respiratory distress.      Breath sounds: Normal breath sounds. No stridor. No wheezing or rhonchi.   Neurological:      General: No focal deficit present.      Mental Status: She is alert and oriented to person, place, and time. She is not disoriented.   Psychiatric:         Mood and Affect: Mood normal.         Behavior: Behavior normal.        Result Review " :                     Assessment and Plan     Diagnoses and all orders for this visit:    1. Chronic obstructive pulmonary disease, unspecified COPD type (Primary)    2. B12 deficiency  -     Vitamin B12    3. Benign essential hypertension  -     Comprehensive Metabolic Panel  -     triamterene-hydrochlorothiazide (MAXZIDE-25) 37.5-25 MG per tablet; Take 1 tablet by mouth Daily.  Dispense: 90 tablet; Refill: 0  -     valsartan (DIOVAN) 80 MG tablet; Take 1 tablet by mouth Daily.  Dispense: 90 tablet; Refill: 1    4. Mixed hyperlipidemia  -     Lipid Panel    5. Gastroesophageal reflux disease without esophagitis  -     dexlansoprazole (DEXILANT) 60 MG capsule; Take 1 capsule by mouth Daily.  Dispense: 90 capsule; Refill: 1    6. Vitamin D deficiency  -     Vitamin D,25-Hydroxy  -     vitamin D (ERGOCALCIFEROL) 1.25 MG (35400 UT) capsule capsule; Take 1 capsule by mouth 1 (One) Time Per Week.  Dispense: 12 capsule; Refill: 0    7. Oral thrush  -     nystatin (MYCOSTATIN) 100,000 unit/mL suspension; Swish and swallow 5 mL 4 (Four) Times a Day.  Dispense: 470 mL; Refill: 0    Other orders  -     amitriptyline (ELAVIL) 25 MG tablet; Take 1 tablet by mouth every night at bedtime.  Dispense: 90 tablet; Refill: 3             Follow Up     No follow-ups on file.  Patient was given instructions and counseling regarding her condition or for health maintenance advice. Please see specific information pulled into the AVS if appropriate.     Start swish and swallow.  Labs and refills today.  Continue rest of plan.      Answers submitted by the patient for this visit:  Other (Submitted on 6/26/2024)  Please describe your symptoms.: Tongue pain  Have you had these symptoms before?: Yes  How long have you been having these symptoms?: Greater than 2 weeks  Primary Reason for Visit (Submitted on 6/26/2024)  What is the primary reason for your visit?: Other

## 2024-06-29 LAB
25(OH)D3+25(OH)D2 SERPL-MCNC: 49.7 NG/ML (ref 30–100)
ALBUMIN SERPL-MCNC: 4.6 G/DL (ref 3.5–5.2)
ALBUMIN/GLOB SERPL: 2.2 G/DL
ALP SERPL-CCNC: 88 U/L (ref 39–117)
ALT SERPL-CCNC: 16 U/L (ref 1–33)
AST SERPL-CCNC: 16 U/L (ref 1–32)
BILIRUB SERPL-MCNC: 0.2 MG/DL (ref 0–1.2)
BUN SERPL-MCNC: 12 MG/DL (ref 8–23)
BUN/CREAT SERPL: 15.8 (ref 7–25)
CALCIUM SERPL-MCNC: 9.8 MG/DL (ref 8.6–10.5)
CHLORIDE SERPL-SCNC: 93 MMOL/L (ref 98–107)
CHOLEST SERPL-MCNC: 247 MG/DL (ref 0–200)
CO2 SERPL-SCNC: 29.8 MMOL/L (ref 22–29)
CREAT SERPL-MCNC: 0.76 MG/DL (ref 0.57–1)
EGFRCR SERPLBLD CKD-EPI 2021: 87.1 ML/MIN/1.73
GLOBULIN SER CALC-MCNC: 2.1 GM/DL
GLUCOSE SERPL-MCNC: 99 MG/DL (ref 65–99)
HDLC SERPL-MCNC: 68 MG/DL (ref 40–60)
LDLC SERPL CALC-MCNC: 160 MG/DL (ref 0–100)
POTASSIUM SERPL-SCNC: 4.2 MMOL/L (ref 3.5–5.2)
PROT SERPL-MCNC: 6.7 G/DL (ref 6–8.5)
SODIUM SERPL-SCNC: 137 MMOL/L (ref 136–145)
TRIGL SERPL-MCNC: 111 MG/DL (ref 0–150)
VIT B12 SERPL-MCNC: 1120 PG/ML (ref 211–946)
VLDLC SERPL CALC-MCNC: 19 MG/DL (ref 5–40)

## 2024-07-01 ENCOUNTER — TELEPHONE (OUTPATIENT)
Dept: FAMILY MEDICINE CLINIC | Facility: CLINIC | Age: 64
End: 2024-07-01
Payer: COMMERCIAL

## 2024-07-01 DIAGNOSIS — I10 BENIGN ESSENTIAL HYPERTENSION: ICD-10-CM

## 2024-07-01 DIAGNOSIS — B37.0 ORAL THRUSH: Primary | ICD-10-CM

## 2024-07-01 RX ORDER — TRIAMTERENE AND HYDROCHLOROTHIAZIDE 37.5; 25 MG/1; MG/1
1 TABLET ORAL DAILY
Qty: 90 TABLET | Refills: 0 | OUTPATIENT
Start: 2024-07-01

## 2024-07-01 RX ORDER — CLOTRIMAZOLE 10 MG/1
10 LOZENGE ORAL; TOPICAL
Qty: 50 TABLET | Refills: 0 | Status: SHIPPED | OUTPATIENT
Start: 2024-07-01 | End: 2024-07-11

## 2024-07-01 RX ORDER — TRIAMTERENE AND HYDROCHLOROTHIAZIDE 37.5; 25 MG/1; MG/1
1 TABLET ORAL DAILY
Qty: 90 TABLET | Refills: 0 | Status: SHIPPED | OUTPATIENT
Start: 2024-07-01

## 2024-07-01 NOTE — TELEPHONE ENCOUNTER
Caller: Rocío Valadez    Relationship: Self    Best call back number: [phone]     Who are you requesting to speak with (clinical staff, provider,  specific staff member): PRATIK    What was the call regarding: PATIENT STATES THAT SHE FOLLOWED UP WITH PHARMACY AS ADVISED, AND THEY HAD NEVER RECEIVED THE PRESCRIPTIONS. REQUESTS CALL BACK TO DISCUSS FURTHER

## 2024-07-01 NOTE — TELEPHONE ENCOUNTER
Caller: Rocío Valadez    Relationship: Self    Best call back number: [phone]     What is the best time to reach you: ANY    Who are you requesting to speak with (clinical staff, provider,  specific staff member): CLINICAL    Do you know the name of the person who called: ROCÍO    What was the call regarding: PATIENT STATES PHARMACY HAS TOLD HER ABOUT A POSSIBLE MEDICINE FOR THRUSH. PLEASE CALL TO DISCUSS

## 2024-07-01 NOTE — TELEPHONE ENCOUNTER
I called and spoke with the patient and let her know that when I sent a message back the provider told me that there was not really an alternative to the nystatin and that she may want to try another pharmacy. She said she has called a few pharmacies and they have all told her it is on backorder but she said she is going to call around. I let her know if she found a pharmacy who had it she should just let us know and we can send it in. She verbalized understanding and I also asked her if she had received the other medication and she said she had called the pharmacy and they had. She will call back if needed.

## 2024-07-01 NOTE — TELEPHONE ENCOUNTER
Caller: Rocío Valadez    Relationship: Self    Best call back number:     What medication are you requesting: TRIAMTERENE 37.5MG    What are your current symptoms:     How long have you been experiencing symptoms:     Have you had these symptoms before:    [] Yes  [] No    Have you been treated for these symptoms before:   [] Yes  [] No    If a prescription is needed, what is your preferred pharmacy and phone number:  [facility] [employer]  [id number] [phone]    Additional notes:  PATIENT IS CALLING IN TO REQUEST A MEDICATION REFILL ON HER TRIAMTERENE 37.5MG BUT THIS IS NOT ON HER MEDICATION LIST.  SHE ALSO MENTIONS THAT DR BRODERICK WAS TO CALL IN A MEDICATION FOR HER MOUTH (NYSTATIN) BUT THIS IS ON BACK ORDER.  SHE WANTS TO BE CALLED TO DISCUSS THIS

## 2024-07-01 NOTE — TELEPHONE ENCOUNTER
I called the patient back and she said she called all around and no one had the nystatin in stock. She said the pharmacist told her about a type of medicine that was like a disc that dissolved in your mouth but they did not tell her the name. She wanted to know if this was heard of and also wanted to know if there was any other alternative she said she didn't understand how there couldn't be more then one medicine for thrush. I let her know I would send a message and ask but was unsure. She verbalized understanding and will call back if needed.

## 2024-07-01 NOTE — TELEPHONE ENCOUNTER
I called and spoke with the patient and let her know that it looked like we sent in the triamterene medication on 6/28/2024. She said the pharmacy stated they never received this and said just hydrochlorothiazide sent in. She then said they told her the nystatin solution was on backorder and they would reach out to us. I looked back at the messages and let her know according to our system they called and said the maxide was on backorder but did not mention nystatin. I let her know I would send a message to see if there was an alternative for the nystatin but asked her to reach out to her pharmacy and check on the triamterene again and see if they received it and see if it is on backorder. She verbalized understanding and will call back if needed.

## 2024-07-01 NOTE — TELEPHONE ENCOUNTER
I called and spoke with the patient and she said they told her they never received the medication and also said that it was not on backorder. I let her know I would try to go ahead and resend it and also let her know that I did send a message about the nystatin. I let her know I would give her a call back with a response. She verbalized understanding and will call back if needed.

## 2024-07-02 ENCOUNTER — TELEPHONE (OUTPATIENT)
Dept: FAMILY MEDICINE CLINIC | Facility: CLINIC | Age: 64
End: 2024-07-02
Payer: COMMERCIAL

## 2024-07-02 RX ORDER — ATORVASTATIN CALCIUM 40 MG/1
40 TABLET, FILM COATED ORAL DAILY
Qty: 90 TABLET | Refills: 3 | Status: SHIPPED | OUTPATIENT
Start: 2024-07-02

## 2024-07-02 NOTE — TELEPHONE ENCOUNTER
Caller: Rocío Valadez PELON    Relationship: Self    Best call back number: [phone]     What medication are you requesting:      What are your current symptoms:      How long have you been experiencing symptoms:      Have you had these symptoms before:    [] Yes  [] No    Have you been treated for these symptoms before:   [] Yes  [] No    If a prescription is needed, what is your preferred pharmacy and phone number: [facility] #[id number] [place], [place] - [address] AT [facility] & [facility] [phone] [facility] [phone] FX     Additional notes: PATIENT CALLED SHE RECEIVED CALL YESTERDAY ABOUT HER BLOOD WORK WHICH HER CHOLESTEROL WAS HIGH AND THAT DR ANTHONY WAS CHANGING THE MEDICATION atorvastatin (LIPITOR) 20 MG tablet FROM 20 MG TO 40 MG.  SHE HAD CALLED THE PHARMACY AND THEY DON'T HAVE A NEW PRESCRIPTION.  CAN DR BRODERICK OR DR ANTHONY SEND IN A NEW PRESCRIPTION FOR THE MEDICATION atorvastatin (LIPITOR) 20 MG tablet FOR 40 MG DOSAGE.

## 2024-07-02 NOTE — TELEPHONE ENCOUNTER
I called and spoke with the patient and let her know that the medication was sent in for her. She verbalized understanding. While on the phone she also let me know that the nystatin medication came in today so they are going to be filling that for her. She will call back if she needs anything else.

## 2024-07-02 NOTE — TELEPHONE ENCOUNTER
I called and spoke with the patient and let her know that I sent a message yesterday about the disk and I let her know one of our nurse practitioners sent it in for her. She verbalized understanding and will call back if needed.

## 2024-07-29 RX ORDER — PRUCALOPRIDE 2 MG/1
2 TABLET, FILM COATED ORAL DAILY
Qty: 90 TABLET | Refills: 0 | Status: SHIPPED | OUTPATIENT
Start: 2024-07-29

## 2024-08-01 RX ORDER — AMITRIPTYLINE HYDROCHLORIDE 25 MG/1
25 TABLET, FILM COATED ORAL
Qty: 90 TABLET | Refills: 3 | OUTPATIENT
Start: 2024-08-01

## 2024-08-02 RX ORDER — AMITRIPTYLINE HYDROCHLORIDE 25 MG/1
25 TABLET, FILM COATED ORAL
Qty: 90 TABLET | Refills: 3 | OUTPATIENT
Start: 2024-08-02

## 2024-08-02 NOTE — TELEPHONE ENCOUNTER
LOV 6/28/24  NOV None  LF 6/28/24 #90 w 3    Spoke w pharmacy, Linda, She has on file.  States the pt is probably trying to fill with old rx number.  [facility] will prep for pt.    [facility]

## 2024-08-02 NOTE — TELEPHONE ENCOUNTER
Caller: Rocío Valadez PELON    Relationship: Self    Best call back number: [phone]     Requested Prescriptions:   Requested Prescriptions     Pending Prescriptions Disp Refills    amitriptyline (ELAVIL) 25 MG tablet 90 tablet 3     Sig: Take 1 tablet by mouth every night at bedtime.        [facility]arm[id number] [address] AT [facility] & [place] [phone] [facility] [phone]      Last office visit with prescribing clinician: 2/5/2024   Last telemedicine visit with prescribing clinician: Visit date not found   Next office visit with prescribing clinician: Visit date not found   NOTE: SAW DR. ANTHONY ON 6/28/24  Additional details provided by patient:   OUT OF MEDICATION       Does the patient have less than a 3 day supply:  [x] Yes  [] No    Would you like a call back once the refill request has been completed: [] Yes [] No    If the office needs to give you a call back, can they leave a voicemail: [] Yes [] No    Anmol Rashid   08/02/24 10:23 EDT

## 2024-08-07 DIAGNOSIS — I10 BENIGN ESSENTIAL HYPERTENSION: ICD-10-CM

## 2024-08-07 RX ORDER — VALSARTAN 80 MG/1
80 TABLET ORAL DAILY
Qty: 90 TABLET | Refills: 1 | OUTPATIENT
Start: 2024-08-07

## 2024-09-07 ENCOUNTER — TELEMEDICINE (OUTPATIENT)
Dept: FAMILY MEDICINE CLINIC | Facility: TELEHEALTH | Age: 64
End: 2024-09-07
Payer: COMMERCIAL

## 2024-09-07 DIAGNOSIS — R39.89 SUSPECTED UTI: Primary | ICD-10-CM

## 2024-09-07 RX ORDER — PHENAZOPYRIDINE HYDROCHLORIDE 200 MG/1
200 TABLET, FILM COATED ORAL 3 TIMES DAILY PRN
Qty: 6 TABLET | Refills: 0 | Status: SHIPPED | OUTPATIENT
Start: 2024-09-07

## 2024-09-07 RX ORDER — SULFAMETHOXAZOLE/TRIMETHOPRIM 800-160 MG
1 TABLET ORAL 2 TIMES DAILY
Qty: 14 TABLET | Refills: 0 | Status: SHIPPED | OUTPATIENT
Start: 2024-09-07 | End: 2024-09-14

## 2024-09-07 RX ORDER — FLUCONAZOLE 150 MG/1
150 TABLET ORAL ONCE
Qty: 1 TABLET | Refills: 0 | Status: SHIPPED | OUTPATIENT
Start: 2024-09-07 | End: 2024-09-07

## 2024-09-07 NOTE — PROGRESS NOTES
You have chosen to receive care through a telehealth visit.  Do you consent to use a video/audio connection for your medical care today? Yes     HPI  Rocío Valadez is a 64 y.o. female  presents with new onset of burning with urination, frequency and urgency. No blood in urine, back pain, abdominal pain or fever. She reports it feels exactly like her previous UTI's. She is not taking anything for relief.     Review of Systems   Constitutional: Negative.    HENT: Negative.     Respiratory: Negative.     Cardiovascular: Negative.    Gastrointestinal:  Negative for abdominal distention, abdominal pain, constipation, diarrhea, nausea and vomiting.   Genitourinary:  Positive for difficulty urinating, dysuria, frequency and urgency. Negative for hematuria, pelvic pain, vaginal bleeding, vaginal discharge and vaginal pain.   Neurological: Negative.    Hematological: Negative.    Psychiatric/Behavioral: Negative.         Past Medical History:   Diagnosis Date    Allergic rhinitis     FOLLOWED BY DR. DOMINICK QUIROZ    Anemia 08/2021    Anesthesia     ** PT REPORTED SHE IS VERY SENSITIVE TO ANESTHESIA    Arthritis     Asthma     Atypical chest pain     B12 deficiency 02/16/2022    Solares's esophagus     Benign essential hypertension     Bloating     Carrier of alpha-1-antitrypsin deficiency     Cervical muscle strain 01/08/2020    Chronic idiopathic constipation     Chronic respiratory failure with hypercapnia     Chronic vaginitis     Colon polyps     FOLLOWED BY DR. MARYJO YBARRA    COPD (chronic obstructive pulmonary disease)     SEVERE, ON LUNG TRANSPLANT LIST    Cough     COVID-19     Cystitis 09/2022    Diverticulitis of colon     Dyspareunia in female     Dysphagia     Early satiety     Environmental allergies     Gallbladder sludge     GERD (gastroesophageal reflux disease)     Greater trochanteric bursitis of left hip 08/2022    Headache     Hemorrhoids     Hiatal hernia     History of transfusion 6/20    Due to  colon    Hyperlipidemia     Irritable bowel syndrome     Low back pain     Mucous retention cyst of salivary gland 2022    OF TONSIL, OFFERED REMOVAL, PT DECLINE, WATCHFUL FOLLOW UP, SEEN BY DR. HUSSAIN SINCLAIR    Oral thrush 2020    Osteoporosis     Oxygen dependent 2021    PNA (pneumonia) 2018    PNA (pneumonia) 2022    [facility]ITT[facility] TO [facility]    Positive colorectal cancer screening using Cologuard test 2021    Postmenopausal HRT (hormone replacement therapy)     Postoperative urinary retention     Pulmonary nodule, right     Rotator cuff tear, right 2020    S/P REPAIR    Throat clearing     Vaginal atrophy     Vitamin D deficiency 2022       Family History   Problem Relation Age of Onset    COPD Mother     Diabetes Mother     Hypertension Mother     Heart disease Mother     Arthritis Mother     Heart attack Mother     Aneurysm Father     Hypertension Father     Cancer Brother     Liver cancer Brother     Cirrhosis Brother     Thyroid disease Daughter     Diabetes Maternal Grandmother     Cancer Maternal Grandmother     Lung cancer Maternal Grandmother     Hypertension Sister     Malig Hyperthermia Neg Hx        Social History     Socioeconomic History    Marital status:    Tobacco Use    Smoking status: Former     Current packs/day: 0.00     Average packs/day: 1.5 packs/day for 30.0 years (45.0 ttl pk-yrs)     Types: Cigarettes     Start date: 1989     Quit date: 2013     Years since quittin.6     Passive exposure: Past    Smokeless tobacco: Never    Tobacco comments:     QUIT 7 YEARS AGO   Vaping Use    Vaping status: Never Used   Substance and Sexual Activity    Alcohol use: Not Currently     Alcohol/week: 2.0 standard drinks of alcohol     Types: 2 Glasses of wine per week     Comment: OCCASIONALLY     Drug use: Never    Sexual activity: Yes     Partners: Male     Birth control/protection: Post-menopausal, Tubal ligation, Hysterectomy         There were no  vitals taken for this visit.    PHYSICAL EXAM  Physical Exam   Constitutional: She is oriented to person, place, and time. She appears well-developed and well-nourished. She does not have a sickly appearance. She does not appear ill. No distress.   HENT:   Head: Normocephalic and atraumatic.   Abdominal: She exhibits no distension. There is no abdominal tenderness. There is no CVA tenderness.   Neurological: She is alert and oriented to person, place, and time.   Psychiatric: She has a normal mood and affect.   Vitals reviewed.      Diagnoses and all orders for this visit:    1. Suspected UTI (Primary)  -     sulfamethoxazole-trimethoprim (Bactrim DS) 800-160 MG per tablet; Take 1 tablet by mouth 2 (Two) Times a Day for 7 days.  Dispense: 14 tablet; Refill: 0  -     phenazopyridine (Pyridium) 200 MG tablet; Take 1 tablet by mouth 3 (Three) Times a Day As Needed for Bladder Spasms.  Dispense: 6 tablet; Refill: 0  -     fluconazole (Diflucan) 150 MG tablet; Take 1 tablet by mouth 1 (One) Time for 1 dose.  Dispense: 1 tablet; Refill: 0     Bactrim ds as prescribed - complete entire course of medication even if you begin to feel better.   --Phenazopyridine is for painful urination and bladder spasms--this medication with cause urine to become bright orange and can stain undergarments.    -Continue to increase your fluid intake.   -Abstain from intercourse during antibiotic treatment.   -Practice good perineal hygiene: wipe front to back  -Do not hold your urine- go to the bathroom every 2-3 hours.     -Warning signs: severe abdominal/pelvic/back pain, fever >101, blood in urine - seek medical attention as soon as possible for a hands on/objective exam and possible labs.     -Follow up with your PCP in 2 days if no improvement in symptoms or if symptoms begin to worsen.        FOLLOW-UP  As discussed during [facility] with [facility], if symptoms worsen or fail to improve, follow-up with PCP/Urgent Care/Emergency  Department.    Patient verbalizes understanding of medications, instructions for treatment and follow-up.    Avani Parker, CLEMENT  09/07/2024  14:11 EDT    The use of a video visit has been reviewed with the patient and verbal informed consent has been obtained. Myself and Rocío Valadez participated in this visit. The patient is located in [place], and I am located in [place], [place]. [employer] and [employer]  were utilized.

## 2024-09-17 DIAGNOSIS — E55.9 VITAMIN D DEFICIENCY: ICD-10-CM

## 2024-09-17 RX ORDER — ERGOCALCIFEROL 1.25 MG/1
50000 CAPSULE, LIQUID FILLED ORAL WEEKLY
Qty: 12 CAPSULE | Refills: 0 | Status: SHIPPED | OUTPATIENT
Start: 2024-09-17

## 2024-09-19 DIAGNOSIS — K21.9 GASTROESOPHAGEAL REFLUX DISEASE WITHOUT ESOPHAGITIS: ICD-10-CM

## 2024-09-20 RX ORDER — DEXLANSOPRAZOLE 60 MG/1
60 CAPSULE, DELAYED RELEASE ORAL DAILY
Qty: 90 CAPSULE | Refills: 1 | Status: SHIPPED | OUTPATIENT
Start: 2024-09-20

## 2024-10-29 ENCOUNTER — TELEPHONE (OUTPATIENT)
Dept: FAMILY MEDICINE CLINIC | Facility: CLINIC | Age: 64
End: 2024-10-29
Payer: COMMERCIAL

## 2024-10-29 DIAGNOSIS — Z12.31 SCREENING MAMMOGRAM FOR BREAST CANCER: Primary | ICD-10-CM

## 2024-10-29 NOTE — TELEPHONE ENCOUNTER
[facility] called and need an referral for a mammogram fax to them, I didn't see an referral in her chart, and patient will be there for the mammogram tomorrow. Their fax number is [phone].

## 2024-10-29 NOTE — TELEPHONE ENCOUNTER
PATIENT CALLED AND STATES SHE JUST RECEIVED A CALL THAT NO MAMMOGRAM ORDER HAS BEEN RECEIVED.     PLEASE FAX ORDER TO   [phone]  PHONE NUMBER  [phone]    CALL BACK NUMBER [phone]    HER APPOINTMENT IS TOMORROW AT 3:30

## 2024-11-06 DIAGNOSIS — I10 BENIGN ESSENTIAL HYPERTENSION: ICD-10-CM

## 2024-11-06 RX ORDER — TRIAMTERENE AND HYDROCHLOROTHIAZIDE 37.5; 25 MG/1; MG/1
1 TABLET ORAL DAILY
Qty: 90 TABLET | Refills: 2 | Status: SHIPPED | OUTPATIENT
Start: 2024-11-06

## 2024-11-06 NOTE — TELEPHONE ENCOUNTER
LOV 9/7/24  NOV None  LF 7/1/24    Protocol  Please advise      [facility]

## 2024-11-06 NOTE — TELEPHONE ENCOUNTER
Caller: Rory Rocío L    Relationship: Self    Best call back number: [phone]     Requested Prescriptions:   Requested Prescriptions     Pending Prescriptions Disp Refills    triamterene-hydrochlorothiazide (MAXZIDE-25) 37.5-25 MG per tablet 90 tablet 0     Sig: Take 1 tablet by mouth Daily.        Pharm[facility] sent[id number] [address] AT [facility]([address] & [place] [phone] [place] [phone]      Last office visit with prescribing clinician: 2/5/2024   Last telemedicine visit with prescribing clinician: Visit date not found   Next office visit with prescribing clinician: Visit date not found     Additional details provided by patient: PATIENT STATES THAT SHE IS OUT OF THIS MEDICATION    Does the patient have less than a 3 day supply:  [x] Yes  [] No    Enma Miles Rep   11/06/24 14:36 EST

## 2024-11-18 DIAGNOSIS — Z12.31 SCREENING MAMMOGRAM FOR BREAST CANCER: ICD-10-CM

## 2024-11-19 ENCOUNTER — TELEPHONE (OUTPATIENT)
Dept: FAMILY MEDICINE CLINIC | Facility: CLINIC | Age: 64
End: 2024-11-19
Payer: COMMERCIAL

## 2024-11-19 NOTE — TELEPHONE ENCOUNTER
Spoke with patient she stated that she believes that the atorvastatin is causing her mouth to born she stopped taking the medication twice and the burning sensation that she was having stopped. She is not wanting to take this anymore.

## 2024-11-20 DIAGNOSIS — E78.2 MIXED HYPERLIPIDEMIA: Primary | ICD-10-CM

## 2024-11-20 RX ORDER — PRAVASTATIN SODIUM 20 MG
20 TABLET ORAL DAILY
Qty: 90 TABLET | Refills: 1 | Status: SHIPPED | OUTPATIENT
Start: 2024-11-20

## 2024-12-23 DIAGNOSIS — K21.9 GASTROESOPHAGEAL REFLUX DISEASE WITHOUT ESOPHAGITIS: ICD-10-CM

## 2024-12-26 RX ORDER — DEXLANSOPRAZOLE 60 MG/1
60 CAPSULE, DELAYED RELEASE ORAL DAILY
Qty: 90 CAPSULE | Refills: 1 | Status: SHIPPED | OUTPATIENT
Start: 2024-12-26

## 2025-01-30 DIAGNOSIS — I10 BENIGN ESSENTIAL HYPERTENSION: ICD-10-CM

## 2025-01-30 NOTE — TELEPHONE ENCOUNTER
LOV                  6/28/2024                    NOV                  Visit date not found-needs ov  LAST REFILL     6/28/2024  PROTOCOL       Not Met

## 2025-01-31 RX ORDER — VALSARTAN 80 MG/1
80 TABLET ORAL DAILY
Qty: 30 TABLET | Refills: 0 | Status: SHIPPED | OUTPATIENT
Start: 2025-01-31

## 2025-02-19 ENCOUNTER — TELEPHONE (OUTPATIENT)
Dept: FAMILY MEDICINE CLINIC | Facility: CLINIC | Age: 65
End: 2025-02-19
Payer: COMMERCIAL

## 2025-02-19 NOTE — TELEPHONE ENCOUNTER
Called Pharmacy per Dr Calixto on Drug Change request.  MO states that pt has tried Omeprazole and Esomeprazole with persistent gastritis per an EGD.  Please keep them on the Dexlansoprazole 60mg-wmo

## 2025-06-17 ENCOUNTER — TELEPHONE (OUTPATIENT)
Dept: FAMILY MEDICINE CLINIC | Facility: CLINIC | Age: 65
End: 2025-06-17

## 2025-06-17 NOTE — TELEPHONE ENCOUNTER
Caller: [facility]    Relationship to patient: Other    Best call back number: [phone]    New or established patient?  [] New  [x] Established    Date of discharge: 06/20/2025    Facility discharged from: [facility] [place]    Diagnosis/Symptoms: LUNG TRANSPLANT    Length of stay (If applicable): 6 MONTHS    Additional Details: PATIENT HAD LUNG TRANSPLANT IN DEC 2024 AND HAS BEEN IN AND OUT OF [facility] OR [facility] FOR THE LAST SIX MONTHS.    [facility] IS RELEASING HER ON FRIDAY JUNE 20 AND HAS MADE A HOSPITAL FOLLOW UP APPT IN [facility] JUNE 26     Statement Selected

## (undated) DEVICE — SKIN PREP TRAY W/CHG: Brand: MEDLINE INDUSTRIES, INC.

## (undated) DEVICE — ADAPT CLN BIOGUARD AIR/H2O DISP

## (undated) DEVICE — SHEET, DRAPE, SPLIT, STERILE: Brand: MEDLINE

## (undated) DEVICE — COVER,MAYO STAND,STERILE: Brand: MEDLINE

## (undated) DEVICE — TUBING, SUCTION, 1/4" X 10', STRAIGHT: Brand: MEDLINE

## (undated) DEVICE — PAD,ABDOMINAL,8"X10",ST,LF: Brand: MEDLINE

## (undated) DEVICE — ERBE NESSY®PLATE 170 SPLIT; 168CM²; CABLE 3M: Brand: ERBE

## (undated) DEVICE — DRAPE,U/ SHT,SPLIT,PLAS,STERIL: Brand: MEDLINE

## (undated) DEVICE — DRSNG GZ PETROLTM XEROFORM CURAD 1X8IN STRL

## (undated) DEVICE — GLV SURG BIOGEL LTX PF 8

## (undated) DEVICE — LN SMPL CO2 SHTRM SD STREAM W/M LUER

## (undated) DEVICE — DRAPE,SHOULDER,BEACH ULTRAGARD: Brand: MEDLINE

## (undated) DEVICE — THE SINGLE USE ETRAP – POLYP TRAP IS USED FOR SUCTION RETRIEVAL OF ENDOSCOPICALLY REMOVED POLYPS.: Brand: ETRAP

## (undated) DEVICE — KT ORCA ORCAPOD DISP STRL

## (undated) DEVICE — SYS PERFUS SEP PLATLT W TIPS CUST

## (undated) DEVICE — STCKNT IMPERV 9X36IN STRL

## (undated) DEVICE — CLEAR-TRAC 7.0 MM X 72 MM THREADED                                    CANNULA, WITH DISPOSABLE OBTURATOR,                                    GREY, STERILE: Brand: CLEAR-TRAC

## (undated) DEVICE — PK ARTHSCP SHLDR TOWER 40

## (undated) DEVICE — BIT DRL JUGGERKNOT 2.9MM

## (undated) DEVICE — 3M™ IOBAN™ 2 ANTIMICROBIAL INCISE DRAPE 6650EZ: Brand: IOBAN™ 2

## (undated) DEVICE — TP NDL SCORPION MULTIFIRE

## (undated) DEVICE — ABL ASP APOLLO RF XL 90D

## (undated) DEVICE — SNAR POLYP SENSATION STDOVL 27 240 BX40

## (undated) DEVICE — BLD SHAVER BONECUTTER 5MM 13CM

## (undated) DEVICE — SENSR O2 OXIMAX FNGR A/ 18IN NONSTR

## (undated) DEVICE — GLV SURG SIGNATURE ESSENTIAL PF LTX SZ8

## (undated) DEVICE — CANN O2 ETCO2 FITS ALL CONN CO2 SMPL A/ 7IN DISP LF